# Patient Record
Sex: FEMALE | Race: WHITE | NOT HISPANIC OR LATINO | Employment: OTHER | URBAN - METROPOLITAN AREA
[De-identification: names, ages, dates, MRNs, and addresses within clinical notes are randomized per-mention and may not be internally consistent; named-entity substitution may affect disease eponyms.]

---

## 2019-09-01 ENCOUNTER — APPOINTMENT (EMERGENCY)
Dept: RADIOLOGY | Facility: HOSPITAL | Age: 84
End: 2019-09-01
Payer: COMMERCIAL

## 2019-09-01 ENCOUNTER — HOSPITAL ENCOUNTER (EMERGENCY)
Facility: HOSPITAL | Age: 84
Discharge: HOME | End: 2019-09-01
Attending: EMERGENCY MEDICINE | Admitting: EMERGENCY MEDICINE
Payer: COMMERCIAL

## 2019-09-01 VITALS
RESPIRATION RATE: 16 BRPM | DIASTOLIC BLOOD PRESSURE: 78 MMHG | TEMPERATURE: 98 F | HEART RATE: 89 BPM | SYSTOLIC BLOOD PRESSURE: 135 MMHG
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
## 2019-09-01 DIAGNOSIS — S01.81XA FOREHEAD LACERATION, INITIAL ENCOUNTER: ICD-10-CM

## 2019-09-01 PROCEDURE — 99284 EMERGENCY DEPT VISIT MOD MDM: CPT | Performed by: EMERGENCY MEDICINE

## 2019-09-01 PROCEDURE — 90471 IMMUNIZATION ADMIN: CPT

## 2019-09-01 PROCEDURE — 99284 EMERGENCY DEPT VISIT MOD MDM: CPT

## 2019-09-01 PROCEDURE — 71045 X-RAY EXAM CHEST 1 VIEW: CPT

## 2019-09-01 PROCEDURE — 72125 CT NECK SPINE W/O DYE: CPT

## 2019-09-01 PROCEDURE — 73560 X-RAY EXAM OF KNEE 1 OR 2: CPT

## 2019-09-01 PROCEDURE — 70450 CT HEAD/BRAIN W/O DYE: CPT

## 2019-09-01 PROCEDURE — 74176 CT ABD & PELVIS W/O CONTRAST: CPT

## 2019-09-01 PROCEDURE — 90715 TDAP VACCINE 7 YRS/> IM: CPT | Performed by: EMERGENCY MEDICINE

## 2019-09-01 PROCEDURE — 12011 RPR F/E/E/N/L/M 2.5 CM/<: CPT | Performed by: EMERGENCY MEDICINE

## 2019-09-01 RX ORDER — ACETAMINOPHEN 500 MG
500 TABLET ORAL
COMMUNITY
Start: 2019-07-08

## 2019-09-01 RX ORDER — DONEPEZIL HYDROCHLORIDE 10 MG/1
TABLET, FILM COATED ORAL
COMMUNITY
Start: 2019-06-08

## 2019-09-01 RX ORDER — MIRTAZAPINE 7.5 MG/1
TABLET, FILM COATED ORAL
COMMUNITY
Start: 2019-04-11

## 2019-09-01 RX ORDER — FUROSEMIDE 20 MG/1
TABLET ORAL
COMMUNITY
Start: 2019-05-14

## 2019-09-01 RX ORDER — ACETAMINOPHEN 500 MG
500 TABLET ORAL EVERY 6 HOURS PRN
Qty: 30 TABLET | Refills: 0 | Status: SHIPPED | OUTPATIENT
Start: 2019-09-01

## 2019-09-01 RX ORDER — LIDOCAINE HYDROCHLORIDE AND EPINEPHRINE 10; 10 MG/ML; UG/ML
1 INJECTION, SOLUTION INFILTRATION; PERINEURAL ONCE
Status: COMPLETED | OUTPATIENT
Start: 2019-09-01 | End: 2019-09-01

## 2019-09-01 RX ORDER — LOPERAMIDE HYDROCHLORIDE 2 MG/1
CAPSULE ORAL
COMMUNITY
Start: 2019-03-07

## 2019-09-01 RX ADMIN — LIDOCAINE HYDROCHLORIDE,EPINEPHRINE BITARTRATE 1 ML: 10; .01 INJECTION, SOLUTION INFILTRATION; PERINEURAL at 22:17

## 2019-09-01 RX ADMIN — TETANUS TOXOID, REDUCED DIPHTHERIA TOXOID AND ACELLULAR PERTUSSIS VACCINE, ADSORBED 0.5 ML: 5; 2.5; 8; 8; 2.5 SUSPENSION INTRAMUSCULAR at 21:17

## 2019-09-02 NOTE — ED ATTENDING ATTESTATION
Violetta Raygoza MD, saw and evaluated the patient  I have discussed the patient with the resident/non-physician practitioner and agree with the resident's/non-physician practitioner's findings, Plan of Care, and MDM as documented in the resident's/non-physician practitioner's note, except where noted  All available labs and Radiology studies were reviewed  I was present for key portions of any procedure(s) performed by the resident/non-physician practitioner and I was immediately available to provide assistance  At this point I agree with the current assessment done in the Emergency Department    I have conducted an independent evaluation of this patient a history and physical is as follows:   Pt missed curb fell and hit head no loc co lac to forehead no neck pain Pt co pain in R sacral area and some r sided chest wall tenderness PE: alert lac to forehead neck and spine nontender mild is tenderness neuro nonfocal r chest wall mild tenderness to palpation MDM: will do ct head and neck xray pelvis cxr    Critical Care Time  Procedures

## 2019-09-02 NOTE — ED PROVIDER NOTES
History  Chief Complaint   Patient presents with   Alejandrovito Brennero Fall     pt was getting into the car when she tripped and hit her head and left knee  pt has c/o of a little pain  -LOC -Thinners      81 y/o F presents for a head laceration after a fall  Pt states that she missed the curb and fell, striking her head  No LOC, No AC/AP, no neck pain    Complains of lower back pain, Lt knee pain  Both are 4/10 and localized    Consult Evaluation - Trauma   Bonita Roberts 80 y o  female MRN: [id number]  Unit/Bed#: QCB Encounter: [id number]    Trauma Assessment and Plan:  CT head, C spine, abd plvs without contrast, CXR, Lt Knee xray  Lac repair    Objective  Vitals:   First set: Temperature: 98 °F (36 7 °C) (19)  Pulse: 89 (19)  Respirations: 16 (19)  Blood Pressure: 135/78 (19)    Primary Survey:   (A) Airway: Intact  (B) Breathing: CTA  (C) Circulation: Pulses:   normal  (D) Disabliity:  GCS Total:  15  (E) Expose:  Completed     Td updated                      Prior to Admission Medications   Prescriptions Last Dose Informant Patient Reported? Taking?   acetaminophen (MAPAP EXTRA STRENGTH) 500 mg tablet 2019 at Unknown time  Yes Yes   Sig: Take 500 mg by mouth   donepezil (ARICEPT) 10 mg tablet 2019 at Unknown time  Yes Yes   Sig: TAKE ONE TABLET BY MOUTH NIGHTLY (DEMENTIA)      furosemide (LASIX) 20 mg tablet 2019 at Unknown time  Yes Yes   Sig: TAKE ONE TABLET BY MOUTH EVERY DAY (EDEMA)   guaiFENesin (ROBITUSSIN) 100 MG/5ML oral liquid 2019 at Unknown time  Yes Yes   Sig: Take 200 mg by mouth Three times daily as needed   loperamide (IMODIUM) 2 mg capsule 2019 at Unknown time  Yes Yes   Si cap every day as needed   mirtazapine (REMERON) 7 5 MG tablet 2019 at Unknown time  Yes Yes   Sig: TAKE ONE TABLET BY MOUTH NIGHTLY (INSOMNIA)   sertraline (ZOLOFT) 50 mg tablet 2019 at Unknown time  Yes Yes   Sig: TAKE ONE TABLET BY MOUTH EVERY DAY (DEPRESSION)      Facility-Administered Medications: None       History reviewed  No pertinent past medical history  No past surgical history on file  History reviewed  No pertinent family history  I have reviewed and agree with the history as documented  Social History     Tobacco Use    Smoking status: Not on file   Substance Use Topics    Alcohol use: Not on file    Drug use: Not on file        Review of Systems   Constitutional: Negative for chills, diaphoresis, fatigue and fever  HENT: Negative for congestion, ear discharge, facial swelling, hearing loss, rhinorrhea, sinus pressure, sinus pain, sneezing, sore throat, tinnitus and trouble swallowing  Eyes: Negative for pain, discharge and redness  Respiratory: Negative for cough, choking, chest tightness, shortness of breath, wheezing and stridor  Cardiovascular: Negative for chest pain, palpitations and leg swelling  Gastrointestinal: Negative for abdominal distention, abdominal pain, blood in stool, constipation, diarrhea, nausea and vomiting  Endocrine: Negative for cold intolerance, polydipsia and polyuria  Genitourinary: Negative for difficulty urinating, dysuria, enuresis, flank pain, frequency and hematuria  Musculoskeletal: Negative for arthralgias, back pain, gait problem and neck stiffness  Skin: Positive for wound  Negative for rash  Neurological: Negative for dizziness, seizures, syncope, weakness, numbness and headaches  Hematological: Negative for adenopathy  Psychiatric/Behavioral: Negative for agitation, confusion, hallucinations, sleep disturbance and suicidal ideas  All other systems reviewed and are negative        Physical Exam  ED Triage Vitals   Temperature Pulse Respirations Blood Pressure SpO2   09/01/19 2035 09/01/19 2035 09/01/19 2035 09/01/19 2046 --   98 °F (36 7 °C) 89 16 135/78       Temp Source Heart Rate Source Patient Position - Orthostatic VS BP Location FiO2 (%)   09/01/19 2035 -- -- -- --   Oral          Pain Score       --                    Orthostatic Vital Signs  Vitals:    09/01/19 2035 09/01/19 2046   BP:  135/78   Pulse: 89        Physical Exam   Constitutional: She is oriented to person, place, and time  She appears well-developed and well-nourished  No distress  HENT:   Head: Normocephalic and atraumatic  Right Ear: External ear normal    Left Ear: External ear normal    Nose: No sinus tenderness  No epistaxis  Mouth/Throat: No oropharyngeal exudate  Eyes: Pupils are equal, round, and reactive to light  Conjunctivae and EOM are normal  Right eye exhibits no discharge  Left eye exhibits no discharge  Neck: No JVD present  Cardiovascular: Normal rate, regular rhythm, normal heart sounds and intact distal pulses  Exam reveals no gallop and no friction rub  No murmur heard  Pulmonary/Chest: Effort normal and breath sounds normal  No stridor  No respiratory distress  She has no wheezes  She has no rales  Abdominal: Soft  Bowel sounds are normal  She exhibits no distension and no mass  There is no tenderness  There is no rebound and no guarding  Musculoskeletal: Normal range of motion  She exhibits no edema, tenderness or deformity  Back:         Arms:  Lymphadenopathy:     She has no cervical adenopathy  Neurological: She is alert and oriented to person, place, and time  She has normal strength  No cranial nerve deficit or sensory deficit  GCS eye subscore is 4  GCS verbal subscore is 5  GCS motor subscore is 6  Reflex Scores:       Patellar reflexes are 2+ on the right side and 2+ on the left side  UE and LE 5/5 strength, No focal neuro deficits  Skin: Skin is warm, dry and intact  Capillary refill takes less than 2 seconds  She is not diaphoretic  Psychiatric: She has a normal mood and affect  Her speech is normal and behavior is normal  Judgment and thought content normal    Nursing note and vitals reviewed        ED Medications  Medications tetanus-diphtheria-acellular pertussis (BOOSTRIX) IM injection 0 5 mL (0 5 mL Intramuscular Given 9/1/19 2117)   lidocaine-epinephrine (XYLOCAINE/EPINEPHRINE) 1 %-1:100,000 injection 1 mL (1 mL Infiltration Given 9/1/19 2217)       Diagnostic Studies  Results Reviewed     None                 CT spine cervical without contrast   ED Interpretation by Dino Madera DO (09/01 2215)      No cervical spine fracture or traumatic malalignment  Workstation performed: [id number]         Final Result by Javon Scales MD (09/01 2213)      No cervical spine fracture or traumatic malalignment  Workstation performed: [id number]         CT head without contrast   ED Interpretation by Dino Madera DO (09/01 2214)      No acute intracranial abnormality  Workstation performed: [id number]         Final Result by Javon Scales MD (09/01 2212)      No acute intracranial abnormality  Workstation performed: [id number]         CT abdomen pelvis wo contrast   Final Result by Javon Scales MD (09/01 2217)      No visceral injury in the abdomen or pelvis  No hemoperitoneum  No acute fracture  Hiatal hernia  Colonic diverticulosis  Atherosclerotic vascular calcifications  Workstation performed: [id number]         XR knee 1 or 2 views left    (Results Pending)   XR chest 1 view portable    (Results Pending)         Procedures  Laceration repair  Date/Time: 9/1/2019 10:18 PM  Performed by: Dino Madera DO  Authorized by: Dino Madera DO   Consent: Verbal consent obtained    Risks and benefits: risks, benefits and alternatives were discussed  Consent given by: patient  Patient understanding: patient states understanding of the procedure being performed  Patient identity confirmed: verbally with patient  Body area: head/neck  Location details: forehead  Laceration length: 2 cm  Foreign bodies: no foreign bodies  Tendon involvement: none  Nerve involvement: none  Anesthesia: local infiltration    Anesthesia:  Local Anesthetic: lidocaine 1% with epinephrine  Anesthetic total: 3 mL    Sedation:  Patient sedated: no      Wound Dehiscence:  Superficial Wound Dehiscence: simple closure      Procedure Details:  Preparation: Patient was prepped and draped in the usual sterile fashion  Irrigation solution: saline  Amount of cleaning: extensive  Debridement: none  Degree of undermining: none  Skin closure: 5-0 nylon  Number of sutures: 6  Technique: simple  Approximation: close  Approximation difficulty: simple  Patient tolerance: Patient tolerated the procedure well with no immediate complications              ED Course                               MDM  Number of Diagnoses or Management Options  Fall, initial encounter: new and requires workup  Forehead laceration, initial encounter: new and requires workup  Diagnosis management comments: Work up as above    1  Head contusion  -tylenol 500mg Q4-6hrs     2  Forehead laceration  -Repaired see above  -f/u withint 5-7 days for eval of sutures    3  Gluteal contusion  -PCP f/u  -Tylenol    4  Chest wall pain  -as above    5  CT findings as below discussed with family and patient  Cortical cyst noted in the interpolar left kidney  Hiatal hernia  Colonic diverticulosis  Atherosclerotic vascular calcifications  Disposition  Final diagnoses:   Fall, initial encounter   Forehead laceration, initial encounter     Time reflects when diagnosis was documented in both MDM as applicable and the Disposition within this note     Time User Action Codes Description Comment    9/1/2019 10:19 PM Dana Saha Add [W19  SEQS] Fall, initial encounter     9/1/2019 10:19 PM Dana Saha Add [S01 81XA] Forehead laceration, initial encounter       ED Disposition     ED Disposition Condition Date/Time Comment    Discharge Stable Sun Sep 1, 2019 10:19 PM Adriane Libman discharge to home/self care             Follow-up Information     Follow up With Specialties Details Why Contact Info Additional Information    [employer]  Call in 1 day  [phone]       Call you primary care physicain in 2 days         [address] [facility] Emergency Department Emergency Medicine Go to  If symptoms worsen, As needed [address] [facility] ED, [address] [facility] [address], [place], [address] [place] [address], [phone]          Patient's Medications   Discharge Prescriptions    ACETAMINOPHEN (TYLENOL) 500 MG TABLET    Take 1 tablet (500 mg total) by mouth every 6 (six) hours as needed for moderate pain       Start Date: 9/1/2019  End Date: --       Order Dose: 500 mg       Quantity: 30 tablet    Refills: 0     No discharge procedures on file  ED Provider  Attending physically available and evaluated Fidelia Jernigan I managed the patient along with the ED Attending      Electronically Signed by         Hasmukh Rasmussen DO  09/01/19 1600 Viridiana Martínez DO  09/01/19 8234

## 2019-09-02 NOTE — DISCHARGE INSTRUCTIONS
Your sutures will need to be evaluated and removed in 5-7 days  Please schedule an appointment with your primary physician or go to an emergency department or urgent care  CT findings showed incidental findings as stated below  Please follow up with your primary care physician regarding these  Cortical cyst noted in the interpolar left kidney  Hiatal hernia  Colonic diverticulosis  Atherosclerotic vascular calcifications

## 2024-01-01 ENCOUNTER — HOME CARE VISIT (OUTPATIENT)
Dept: HOME HOSPICE | Facility: HOSPICE | Age: 89
End: 2024-01-01
Payer: MEDICARE

## 2024-01-01 ENCOUNTER — HOME CARE VISIT (OUTPATIENT)
Dept: HOME HEALTH SERVICES | Facility: HOME HEALTHCARE | Age: 89
End: 2024-01-01
Payer: MEDICARE

## 2024-01-01 ENCOUNTER — HOSPICE ADMISSION (OUTPATIENT)
Dept: HOME HOSPICE | Facility: HOSPICE | Age: 89
End: 2024-01-01
Payer: MEDICARE

## 2024-01-01 PROCEDURE — G0299 HHS/HOSPICE OF RN EA 15 MIN: HCPCS

## 2024-01-01 PROCEDURE — 10330087 HSPC SERVICE INTENSITY ADD-ON

## 2024-01-01 PROCEDURE — G0156 HHCP-SVS OF AIDE,EA 15 MIN: HCPCS

## 2024-08-11 ENCOUNTER — APPOINTMENT (EMERGENCY)
Dept: RADIOLOGY | Facility: HOSPITAL | Age: 89
DRG: 552 | End: 2024-08-11
Payer: COMMERCIAL

## 2024-08-11 ENCOUNTER — HOSPITAL ENCOUNTER (INPATIENT)
Facility: HOSPITAL | Age: 89
LOS: 4 days | Discharge: HOME HEALTH | DRG: 552 | End: 2024-08-15
Attending: EMERGENCY MEDICINE | Admitting: SURGERY
Payer: COMMERCIAL

## 2024-08-11 ENCOUNTER — APPOINTMENT (INPATIENT)
Dept: RADIOLOGY | Facility: HOSPITAL | Age: 89
DRG: 552 | End: 2024-08-11
Payer: COMMERCIAL

## 2024-08-11 ENCOUNTER — APPOINTMENT (EMERGENCY)
Dept: CT IMAGING | Facility: HOSPITAL | Age: 89
DRG: 552 | End: 2024-08-11
Payer: COMMERCIAL

## 2024-08-11 DIAGNOSIS — R26.2 AMBULATORY DYSFUNCTION: ICD-10-CM

## 2024-08-11 DIAGNOSIS — R65.10 SIRS (SYSTEMIC INFLAMMATORY RESPONSE SYNDROME) (HCC): ICD-10-CM

## 2024-08-11 DIAGNOSIS — S22.000A THORACIC COMPRESSION FRACTURE (HCC): Primary | ICD-10-CM

## 2024-08-11 DIAGNOSIS — K86.9 PANCREATIC LESION: ICD-10-CM

## 2024-08-11 DIAGNOSIS — N39.0 ACUTE UTI (URINARY TRACT INFECTION): ICD-10-CM

## 2024-08-11 DIAGNOSIS — I48.4 ATYPICAL ATRIAL FLUTTER (HCC): ICD-10-CM

## 2024-08-11 DIAGNOSIS — I48.92 ATRIAL FLUTTER, UNSPECIFIED TYPE (HCC): ICD-10-CM

## 2024-08-11 DIAGNOSIS — R06.00 DYSPNEA: ICD-10-CM

## 2024-08-11 DIAGNOSIS — K44.9 HIATAL HERNIA: ICD-10-CM

## 2024-08-11 DIAGNOSIS — R03.0 ELEVATED BLOOD PRESSURE READING: ICD-10-CM

## 2024-08-11 DIAGNOSIS — R07.9 CHEST PAIN: ICD-10-CM
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
## 2024-08-11 PROBLEM — S22.079A CLOSED FRACTURE OF NINTH THORACIC VERTEBRA (HCC): Status: ACTIVE | Noted: 2024-08-11

## 2024-08-11 LAB
2HR DELTA HS TROPONIN: -3 NG/L
4HR DELTA HS TROPONIN: -1 NG/L
ABO GROUP BLD: NORMAL
ABO GROUP BLD: NORMAL
ALBUMIN SERPL BCG-MCNC: 4.2 G/DL (ref 3.5–5)
ALP SERPL-CCNC: 62 U/L (ref 34–104)
ALT SERPL W P-5'-P-CCNC: 19 U/L (ref 7–52)
ANION GAP SERPL CALCULATED.3IONS-SCNC: 12 MMOL/L (ref 4–13)
APTT PPP: 27 SECONDS (ref 23–34)
AST SERPL W P-5'-P-CCNC: 19 U/L (ref 13–39)
ATRIAL RATE: 98 BPM
BACTERIA UR QL AUTO: ABNORMAL /HPF
BASOPHILS # BLD AUTO: 0.02 THOUSANDS/ÂΜL (ref 0–0.1)
BASOPHILS NFR BLD AUTO: 0 % (ref 0–1)
BILIRUB SERPL-MCNC: 0.87 MG/DL (ref 0.2–1)
BILIRUB UR QL STRIP: NEGATIVE
BLD GP AB SCN SERPL QL: NEGATIVE
BUN SERPL-MCNC: 29 MG/DL (ref 5–25)
CALCIUM SERPL-MCNC: 9.4 MG/DL (ref 8.4–10.2)
CARDIAC TROPONIN I PNL SERPL HS: 30 NG/L
CARDIAC TROPONIN I PNL SERPL HS: 32 NG/L
CARDIAC TROPONIN I PNL SERPL HS: 33 NG/L
CHLORIDE SERPL-SCNC: 105 MMOL/L (ref 96–108)
CK SERPL-CCNC: 50 U/L (ref 26–192)
CLARITY UR: CLEAR
CO2 SERPL-SCNC: 23 MMOL/L (ref 21–32)
COLOR UR: ABNORMAL
CREAT SERPL-MCNC: 1.15 MG/DL (ref 0.6–1.3)
EOSINOPHIL # BLD AUTO: 0 THOUSAND/ÂΜL (ref 0–0.61)
EOSINOPHIL NFR BLD AUTO: 0 % (ref 0–6)
ERYTHROCYTE [DISTWIDTH] IN BLOOD BY AUTOMATED COUNT: 13.7 % (ref 11.6–15.1)
GFR SERPL CREATININE-BSD FRML MDRD: 39 ML/MIN/1.73SQ M
GLUCOSE SERPL-MCNC: 152 MG/DL (ref 65–140)
GLUCOSE UR STRIP-MCNC: NEGATIVE MG/DL
HCT VFR BLD AUTO: 42.5 % (ref 34.8–46.1)
HGB BLD-MCNC: 13.6 G/DL (ref 11.5–15.4)
HGB UR QL STRIP.AUTO: ABNORMAL
IMM GRANULOCYTES # BLD AUTO: 0.09 THOUSAND/UL (ref 0–0.2)
IMM GRANULOCYTES NFR BLD AUTO: 1 % (ref 0–2)
INR PPP: 0.99 (ref 0.85–1.19)
KETONES UR STRIP-MCNC: NEGATIVE MG/DL
LACTATE SERPL-SCNC: 1 MMOL/L (ref 0.5–2)
LACTATE SERPL-SCNC: 3.3 MMOL/L (ref 0.5–2)
LEUKOCYTE ESTERASE UR QL STRIP: ABNORMAL
LIPASE SERPL-CCNC: 10 U/L (ref 11–82)
LYMPHOCYTES # BLD AUTO: 1.03 THOUSANDS/ÂΜL (ref 0.6–4.47)
LYMPHOCYTES NFR BLD AUTO: 7 % (ref 14–44)
MCH RBC QN AUTO: 31.9 PG (ref 26.8–34.3)
MCHC RBC AUTO-ENTMCNC: 32 G/DL (ref 31.4–37.4)
MCV RBC AUTO: 100 FL (ref 82–98)
MONOCYTES # BLD AUTO: 0.62 THOUSAND/ÂΜL (ref 0.17–1.22)
MONOCYTES NFR BLD AUTO: 4 % (ref 4–12)
NEUTROPHILS # BLD AUTO: 12.84 THOUSANDS/ÂΜL (ref 1.85–7.62)
NEUTS SEG NFR BLD AUTO: 88 % (ref 43–75)
NITRITE UR QL STRIP: NEGATIVE
NON-SQ EPI CELLS URNS QL MICRO: ABNORMAL /HPF
NRBC BLD AUTO-RTO: 0 /100 WBCS
P AXIS: 88 DEGREES
PH UR STRIP.AUTO: 5.5 [PH]
PLATELET # BLD AUTO: 299 THOUSANDS/UL (ref 149–390)
PMV BLD AUTO: 10.3 FL (ref 8.9–12.7)
POTASSIUM SERPL-SCNC: 3.7 MMOL/L (ref 3.5–5.3)
PR INTERVAL: 168 MS
PROCALCITONIN SERPL-MCNC: 0.14 NG/ML
PROT SERPL-MCNC: 7.8 G/DL (ref 6.4–8.4)
PROT UR STRIP-MCNC: ABNORMAL MG/DL
PROTHROMBIN TIME: 13.8 SECONDS (ref 12.3–15)
QRS AXIS: 55 DEGREES
QRSD INTERVAL: 72 MS
QT INTERVAL: 360 MS
QTC INTERVAL: 454 MS
RBC # BLD AUTO: 4.27 MILLION/UL (ref 3.81–5.12)
RBC #/AREA URNS AUTO: ABNORMAL /HPF
RH BLD: POSITIVE
RH BLD: POSITIVE
SODIUM SERPL-SCNC: 140 MMOL/L (ref 135–147)
SP GR UR STRIP.AUTO: 1.02 (ref 1–1.03)
SPECIMEN EXPIRATION DATE: NORMAL
T WAVE AXIS: 20 DEGREES
UROBILINOGEN UR STRIP-ACNC: <2 MG/DL
VENTRICULAR RATE: 96 BPM
WBC # BLD AUTO: 14.6 THOUSAND/UL (ref 4.31–10.16)
WBC #/AREA URNS AUTO: ABNORMAL /HPF

## 2024-08-11 PROCEDURE — 86901 BLOOD TYPING SEROLOGIC RH(D): CPT | Performed by: PHYSICIAN ASSISTANT

## 2024-08-11 PROCEDURE — 85610 PROTHROMBIN TIME: CPT | Performed by: EMERGENCY MEDICINE

## 2024-08-11 PROCEDURE — 83605 ASSAY OF LACTIC ACID: CPT | Performed by: EMERGENCY MEDICINE

## 2024-08-11 PROCEDURE — 93010 ELECTROCARDIOGRAM REPORT: CPT | Performed by: INTERNAL MEDICINE

## 2024-08-11 PROCEDURE — 84145 PROCALCITONIN (PCT): CPT | Performed by: EMERGENCY MEDICINE

## 2024-08-11 PROCEDURE — 72125 CT NECK SPINE W/O DYE: CPT

## 2024-08-11 PROCEDURE — 85730 THROMBOPLASTIN TIME PARTIAL: CPT | Performed by: EMERGENCY MEDICINE

## 2024-08-11 PROCEDURE — 83690 ASSAY OF LIPASE: CPT | Performed by: EMERGENCY MEDICINE

## 2024-08-11 PROCEDURE — 97163 PT EVAL HIGH COMPLEX 45 MIN: CPT

## 2024-08-11 PROCEDURE — 73590 X-RAY EXAM OF LOWER LEG: CPT

## 2024-08-11 PROCEDURE — 99223 1ST HOSP IP/OBS HIGH 75: CPT | Performed by: SURGERY

## 2024-08-11 PROCEDURE — 84484 ASSAY OF TROPONIN QUANT: CPT | Performed by: EMERGENCY MEDICINE

## 2024-08-11 PROCEDURE — 87040 BLOOD CULTURE FOR BACTERIA: CPT | Performed by: EMERGENCY MEDICINE

## 2024-08-11 PROCEDURE — 87086 URINE CULTURE/COLONY COUNT: CPT | Performed by: EMERGENCY MEDICINE

## 2024-08-11 PROCEDURE — 85025 COMPLETE CBC W/AUTO DIFF WBC: CPT | Performed by: EMERGENCY MEDICINE

## 2024-08-11 PROCEDURE — 36415 COLL VENOUS BLD VENIPUNCTURE: CPT | Performed by: EMERGENCY MEDICINE

## 2024-08-11 PROCEDURE — 93005 ELECTROCARDIOGRAM TRACING: CPT

## 2024-08-11 PROCEDURE — 74176 CT ABD & PELVIS W/O CONTRAST: CPT

## 2024-08-11 PROCEDURE — 96365 THER/PROPH/DIAG IV INF INIT: CPT

## 2024-08-11 PROCEDURE — 86900 BLOOD TYPING SEROLOGIC ABO: CPT | Performed by: PHYSICIAN ASSISTANT

## 2024-08-11 PROCEDURE — 80053 COMPREHEN METABOLIC PANEL: CPT | Performed by: EMERGENCY MEDICINE

## 2024-08-11 PROCEDURE — 99291 CRITICAL CARE FIRST HOUR: CPT | Performed by: EMERGENCY MEDICINE

## 2024-08-11 PROCEDURE — 71250 CT THORAX DX C-: CPT

## 2024-08-11 PROCEDURE — 96375 TX/PRO/DX INJ NEW DRUG ADDON: CPT

## 2024-08-11 PROCEDURE — 97760 ORTHOTIC MGMT&TRAING 1ST ENC: CPT

## 2024-08-11 PROCEDURE — 70450 CT HEAD/BRAIN W/O DYE: CPT

## 2024-08-11 PROCEDURE — 82550 ASSAY OF CK (CPK): CPT | Performed by: EMERGENCY MEDICINE

## 2024-08-11 PROCEDURE — 81001 URINALYSIS AUTO W/SCOPE: CPT | Performed by: EMERGENCY MEDICINE

## 2024-08-11 PROCEDURE — 72072 X-RAY EXAM THORAC SPINE 3VWS: CPT

## 2024-08-11 PROCEDURE — 86850 RBC ANTIBODY SCREEN: CPT | Performed by: PHYSICIAN ASSISTANT

## 2024-08-11 PROCEDURE — 99285 EMERGENCY DEPT VISIT HI MDM: CPT

## 2024-08-11 RX ORDER — SODIUM CHLORIDE, SODIUM GLUCONATE, SODIUM ACETATE, POTASSIUM CHLORIDE, MAGNESIUM CHLORIDE, SODIUM PHOSPHATE, DIBASIC, AND POTASSIUM PHOSPHATE .53; .5; .37; .037; .03; .012; .00082 G/100ML; G/100ML; G/100ML; G/100ML; G/100ML; G/100ML; G/100ML
50 INJECTION, SOLUTION INTRAVENOUS CONTINUOUS
Status: DISCONTINUED | OUTPATIENT
Start: 2024-08-11 | End: 2024-08-12

## 2024-08-11 RX ORDER — AMLODIPINE BESYLATE 2.5 MG/1
2.5 TABLET ORAL DAILY
COMMUNITY

## 2024-08-11 RX ORDER — HYDROMORPHONE HCL IN WATER/PF 6 MG/30 ML
0.2 PATIENT CONTROLLED ANALGESIA SYRINGE INTRAVENOUS EVERY 2 HOUR PRN
Status: DISCONTINUED | OUTPATIENT
Start: 2024-08-11 | End: 2024-08-12

## 2024-08-11 RX ORDER — AMOXICILLIN 250 MG
1 CAPSULE ORAL
Status: DISCONTINUED | OUTPATIENT
Start: 2024-08-11 | End: 2024-08-15 | Stop reason: HOSPADM

## 2024-08-11 RX ORDER — ONDANSETRON 2 MG/ML
4 INJECTION INTRAMUSCULAR; INTRAVENOUS ONCE
Status: COMPLETED | OUTPATIENT
Start: 2024-08-11 | End: 2024-08-11

## 2024-08-11 RX ORDER — NYSTATIN 100000 [USP'U]/G
POWDER TOPICAL 2 TIMES DAILY
Status: DISCONTINUED | OUTPATIENT
Start: 2024-08-11 | End: 2024-08-15 | Stop reason: HOSPADM

## 2024-08-11 RX ORDER — GABAPENTIN 100 MG/1
100 CAPSULE ORAL
Status: DISCONTINUED | OUTPATIENT
Start: 2024-08-11 | End: 2024-08-15 | Stop reason: HOSPADM

## 2024-08-11 RX ORDER — OXYCODONE HYDROCHLORIDE 5 MG/1
5 TABLET ORAL EVERY 4 HOURS PRN
Status: DISCONTINUED | OUTPATIENT
Start: 2024-08-11 | End: 2024-08-15 | Stop reason: HOSPADM

## 2024-08-11 RX ORDER — LIDOCAINE 50 MG/G
1 PATCH TOPICAL DAILY
Status: DISCONTINUED | OUTPATIENT
Start: 2024-08-11 | End: 2024-08-15 | Stop reason: HOSPADM

## 2024-08-11 RX ORDER — HEPARIN SODIUM 5000 [USP'U]/ML
5000 INJECTION, SOLUTION INTRAVENOUS; SUBCUTANEOUS EVERY 8 HOURS SCHEDULED
Status: DISCONTINUED | OUTPATIENT
Start: 2024-08-11 | End: 2024-08-15 | Stop reason: HOSPADM

## 2024-08-11 RX ORDER — POLYETHYLENE GLYCOL 3350 17 G/17G
17 POWDER, FOR SOLUTION ORAL DAILY
Status: DISCONTINUED | OUTPATIENT
Start: 2024-08-11 | End: 2024-08-15 | Stop reason: HOSPADM

## 2024-08-11 RX ORDER — ONDANSETRON 2 MG/ML
4 INJECTION INTRAMUSCULAR; INTRAVENOUS EVERY 6 HOURS PRN
Status: DISCONTINUED | OUTPATIENT
Start: 2024-08-11 | End: 2024-08-15 | Stop reason: HOSPADM

## 2024-08-11 RX ORDER — ACETAMINOPHEN 325 MG/1
975 TABLET ORAL EVERY 8 HOURS SCHEDULED
Status: DISCONTINUED | OUTPATIENT
Start: 2024-08-11 | End: 2024-08-15 | Stop reason: HOSPADM

## 2024-08-11 RX ADMIN — SODIUM CHLORIDE, SODIUM GLUCONATE, SODIUM ACETATE, POTASSIUM CHLORIDE, MAGNESIUM CHLORIDE, SODIUM PHOSPHATE, DIBASIC, AND POTASSIUM PHOSPHATE 50 ML/HR: .53; .5; .37; .037; .03; .012; .00082 INJECTION, SOLUTION INTRAVENOUS at 12:27

## 2024-08-11 RX ADMIN — LIDOCAINE 1 PATCH: 700 PATCH TOPICAL at 12:27

## 2024-08-11 RX ADMIN — NYSTATIN: 100000 POWDER TOPICAL at 18:56

## 2024-08-11 RX ADMIN — POLYETHYLENE GLYCOL 3350 17 G: 17 POWDER, FOR SOLUTION ORAL at 17:50

## 2024-08-11 RX ADMIN — CEFTRIAXONE SODIUM 1000 MG: 10 INJECTION, POWDER, FOR SOLUTION INTRAVENOUS at 10:06

## 2024-08-11 RX ADMIN — ONDANSETRON 4 MG: 2 INJECTION INTRAMUSCULAR; INTRAVENOUS at 09:01

## 2024-08-11 RX ADMIN — SODIUM CHLORIDE 500 ML: 0.9 INJECTION, SOLUTION INTRAVENOUS at 09:46

## 2024-08-11 RX ADMIN — HEPARIN SODIUM 5000 UNITS: 5000 INJECTION INTRAVENOUS; SUBCUTANEOUS at 21:30

## 2024-08-11 RX ADMIN — HEPARIN SODIUM 5000 UNITS: 5000 INJECTION INTRAVENOUS; SUBCUTANEOUS at 15:02

## 2024-08-11 RX ADMIN — ACETAMINOPHEN 975 MG: 325 TABLET, FILM COATED ORAL at 21:30

## 2024-08-11 RX ADMIN — SENNOSIDES AND DOCUSATE SODIUM 1 TABLET: 50; 8.6 TABLET ORAL at 21:30

## 2024-08-11 RX ADMIN — ONDANSETRON 4 MG: 2 INJECTION INTRAMUSCULAR; INTRAVENOUS at 15:02

## 2024-08-11 RX ADMIN — GABAPENTIN 100 MG: 100 CAPSULE ORAL at 21:30

## 2024-08-11 RX ADMIN — ACETAMINOPHEN 975 MG: 325 TABLET, FILM COATED ORAL at 15:02

## 2024-08-11 NOTE — SEPSIS NOTE
"  Sepsis Note   lOiva Dc 89 y.o. female MRN: [id number]  Unit/Bed#: W -01 Encounter: [id number]       Initial Sepsis Screening       Row Name 08/11/24 1818 08/11/24 1005 08/11/24 0907          Is the patient's history suggestive of a new or worsening infection? Yes (Proceed)  -CL Yes (Proceed)  -CL Yes (Proceed)  -CL      Suspected source of infection suspect infection, source unknown  -CL suspect infection, source unknown  -CL suspect infection, source unknown  -CL      Indicate SIRS criteria Tachycardia > 90 bpm;Tachypnea > 20 resp per min  -CL Tachycardia > 90 bpm;Leukocytosis (WBC > 87614 IJL) OR Leukopenia (WBC <4000 IJL) OR Bandemia (WBC >10% bands)  -CL Tachycardia > 90 bpm;Tachypnea > 20 resp per min  -CL      Are two or more of the above signs & symptoms of infection both present and new to the patient? Yes (Proceed)  -CL Yes (Proceed)  -CL Yes (Proceed)  -CL      Assess for evidence of organ dysfunction: Are any of the below criteria present within 6 hours of suspected infection and SIRS criteria that are NOT considered to be chronic conditions? Lactate > 2.0  -CL Lactate > 2.0  -CL Lactate > 2.0  -CL      Date of presentation of severe sepsis 08/11/24  -CL 08/11/24  -CL 08/11/24  -CL      Time of presentation of severe sepsis 0907  -CL 1005  -CL 0907  -CL      Sepsis Note: Click \"NEXT\" below (NOT \"close\") to generate sepsis note based on above information. YES (proceed by clicking \"NEXT\")  -CL YES (proceed by clicking \"NEXT\")  -CL YES (proceed by clicking \"NEXT\")  -CL                User Key  (r) = Recorded By, (t) = Taken By, (c) = Cosigned By      Initials Name Provider Type    CL Cortes Jiménez MD Physician                    Default Flowsheet Data (Last 720 Hours)       Sepsis Reassess       Row Name 08/11/24 1006                   Repeat Volume Status and Tissue Perfusion Assessment Performed    Date of Reassessment: 08/11/24  -CL        Time of Reassessment: 1006  -CL        " "Sepsis Reassessment Note: Click \"NEXT\" below (NOT \"close\") to generate sepsis reassessment note. YES (proceed by clicking \"NEXT\")  -CL        Repeat Volume Status and Tissue Perfusion Assessment Performed --                  User Key  (r) = Recorded By, (t) = Taken By, (c) = Cosigned By      Initials Name Provider Type    CL Cortes Jiménez MD Physician                    Body mass index is 20.19 kg/m².  Wt Readings from Last 1 Encounters:   08/11/24 46.9 kg (103 lb 6.3 oz)     IBW (Ideal Body Weight): 45.5 kg    Ideal body weight: 45.5 kg (100 lb 4.9 oz)  Adjusted ideal body weight: 46.1 kg (101 lb 8.7 oz)    "

## 2024-08-11 NOTE — ED PROVIDER NOTES
History  Chief Complaint   Patient presents with    Fall     Fall 5 days ago out of bed onto a fan onto left side w +HS, -LOC. Pt was up all night vomiting. Dark vomit found on pt's nightgown. Pt c/o L sided rib pain, chest pain, and back pain. Pt is not on blood thinners or asa     Patient is a 89-year-old female, with a history significant for dementia and osteoarthritis per my review the medical record, who presents to the ED today for evaluation after fall.  Patient does remember falling and denies any prodromal symptoms.  She states she lost her footing causing her to fall over and strike her head.  Patient takes no anticoagulant medication.  Since the fall, patient has had constant and progressively worsening pain on her right chest wall and abdomen.  She reports difficulty walking as well as back pain and frequent vomiting as well.  Patient has been taking Tylenol, 2 to 3 pills daily, to remit her symptoms.  Touch/movement exacerbates her symptoms.  Patient's granddaughter, present in room and find collateral history, states patient is not confused.  Patient is without other concerns at this time        Prior to Admission Medications   Prescriptions Last Dose Informant Patient Reported? Taking?   acetaminophen (MAPAP EXTRA STRENGTH) 500 mg tablet 8/11/2024  Yes Yes   Sig: Take 500 mg by mouth   acetaminophen (TYLENOL) 500 mg tablet   No No   Sig: Take 1 tablet (500 mg total) by mouth every 6 (six) hours as needed for moderate pain   amLODIPine (NORVASC) 2.5 mg tablet 8/10/2024 at 0800 Family Member Yes Yes   Sig: Take 2.5 mg by mouth daily   donepezil (ARICEPT) 10 mg tablet 8/10/2024  Yes Yes   Sig: TAKE ONE TABLET BY MOUTH NIGHTLY (DEMENTIA)..   furosemide (LASIX) 20 mg tablet 8/10/2024  Yes Yes   Sig: TAKE ONE TABLET BY MOUTH EVERY DAY (EDEMA)   guaiFENesin (ROBITUSSIN) 100 MG/5ML oral liquid Not Taking  Yes No   Sig: Take 200 mg by mouth Three times daily as needed   Patient not taking: Reported on  2024   loperamide (IMODIUM) 2 mg capsule Not Taking  Yes No   Si cap every day as needed   Patient not taking: Reported on 2024   mirtazapine (REMERON) 7.5 MG tablet 8/10/2024  Yes Yes   Sig: TAKE ONE TABLET BY MOUTH NIGHTLY (INSOMNIA)   sertraline (ZOLOFT) 50 mg tablet 8/10/2024  Yes Yes   Sig: TAKE ONE TABLET BY MOUTH EVERY DAY (DEPRESSION)      Facility-Administered Medications: None       History reviewed. No pertinent past medical history.    History reviewed. No pertinent surgical history.    History reviewed. No pertinent family history.  I have reviewed and agree with the history as documented.    E-Cigarette/Vaping     E-Cigarette/Vaping Substances          Review of Systems   Constitutional:  Negative for fever.   HENT:  Negative for trouble swallowing.    Eyes:  Negative for visual disturbance.   Respiratory:  Negative for shortness of breath.    Cardiovascular:  Negative for chest pain.   Gastrointestinal:  Negative for abdominal pain.   Endocrine: Negative for polyuria.   Genitourinary:  Negative for dysuria.   Musculoskeletal:  Positive for back pain and gait problem.   Skin:  Negative for rash.   Allergic/Immunologic: Positive for environmental allergies.   Neurological:  Negative for weakness and numbness.   Hematological:  Negative for adenopathy.   Psychiatric/Behavioral:  Negative for confusion.    All other systems reviewed and are negative.      Physical Exam  Physical Exam  Vitals and nursing note reviewed.   Constitutional:       General: She is not in acute distress.     Appearance: She is not ill-appearing, toxic-appearing or diaphoretic.   HENT:      Head: Normocephalic and atraumatic.      Right Ear: External ear normal.      Left Ear: External ear normal.      Nose: Nose normal. No rhinorrhea.      Mouth/Throat:      Mouth: Mucous membranes are moist.      Pharynx: Oropharynx is clear. No oropharyngeal exudate or posterior oropharyngeal erythema.      Comments: Uvula  midline. No oropharyngeal or submandibular mass/swelling  Eyes:      General: No scleral icterus.        Right eye: No discharge.         Left eye: No discharge.      Conjunctiva/sclera: Conjunctivae normal.      Pupils: Pupils are equal, round, and reactive to light.   Neck:      Comments: Patient is spontaneously rotating their neck to the left and right during the history and physical exam interaction without difficulty or apparent discomfort    Cardiovascular:      Rate and Rhythm: Normal rate and regular rhythm.      Pulses: Normal pulses.      Heart sounds: Normal heart sounds. No murmur heard.     No friction rub. No gallop.      Comments: 2+ Radial and DP  Pulmonary:      Effort: Pulmonary effort is normal. No respiratory distress.      Breath sounds: Normal breath sounds. No stridor. No wheezing, rhonchi or rales.   Chest:      Chest wall: Tenderness present.   Abdominal:      General: Abdomen is flat. There is no distension.      Palpations: Abdomen is soft.      Tenderness: There is abdominal tenderness. There is no right CVA tenderness, left CVA tenderness, guarding or rebound.   Musculoskeletal:         General: Tenderness present. No deformity.      Cervical back: Neck supple. No tenderness. No muscular tenderness.      Comments: Tenderness to palpation of the midline T and L-spine.  Tenderness to palpation over multiple rib spaces on the right chest wall as well as over the right side of the pelvis and throughout the abdomen    No tenderness to palpation of the bilateral shoulders, elbows, arms, thighs, knees, legs.     Bruising on right shin   Lymphadenopathy:      Cervical: No cervical adenopathy.   Skin:     General: Skin is warm and dry.      Capillary Refill: Capillary refill takes less than 2 seconds.   Neurological:      Mental Status: She is alert.      Comments: Patient is speaking clearly in complete sentences.  Patient is answering appropriately and able follow commands.  Patient is moving  all four extremities spontaneously.  No facial droop.  Tongue midline.      Psychiatric:         Mood and Affect: Mood normal.         Behavior: Behavior normal.         Vital Signs  ED Triage Vitals   Temperature Pulse Respirations Blood Pressure SpO2   08/11/24 0842 08/11/24 0842 08/11/24 0842 08/11/24 0842 08/11/24 0842   97.9 °F (36.6 °C) (!) 125 (!) 24 131/75 96 %      Temp src Heart Rate Source Patient Position - Orthostatic VS BP Location FiO2 (%)   -- 08/11/24 0842 08/11/24 0945 08/11/24 0945 --    Monitor Lying Right arm       Pain Score       08/11/24 1235       3           Vitals:    08/11/24 1529 08/11/24 1544 08/11/24 1556 08/11/24 1734   BP: (!) 180/75 162/60 168/70 170/70   Pulse: 86 87     Patient Position - Orthostatic VS:  Lying Sitting Sitting         Visual Acuity  Visual Acuity      Flowsheet Row Most Recent Value   L Pupil Size (mm) 2   R Pupil Size (mm) 2   L Pupil Shape Round   R Pupil Shape Round            ED Medications  Medications   ondansetron (ZOFRAN) injection 4 mg (4 mg Intravenous Given 8/11/24 1502)   multi-electrolyte (PLASMALYTE-A/ISOLYTE-S PH 7.4) IV solution (50 mL/hr Intravenous New Bag 8/11/24 1227)   heparin (porcine) subcutaneous injection 5,000 Units (5,000 Units Subcutaneous Given 8/11/24 1502)   gabapentin (NEURONTIN) capsule 100 mg (has no administration in time range)   acetaminophen (TYLENOL) tablet 975 mg (975 mg Oral Given 8/11/24 1502)   oxyCODONE (ROXICODONE) split tablet 2.5 mg (has no administration in time range)     Or   oxyCODONE (ROXICODONE) IR tablet 5 mg (has no administration in time range)   HYDROmorphone HCl (DILAUDID) injection 0.2 mg (has no administration in time range)   naloxone (NARCAN) 0.04 mg/mL syringe 0.04 mg (has no administration in time range)   senna-docusate sodium (SENOKOT S) 8.6-50 mg per tablet 1 tablet (has no administration in time range)   polyethylene glycol (MIRALAX) packet 17 g (17 g Oral Given 8/11/24 9870)   lidocaine  (LIDODERM) 5 % patch 1 patch (1 patch Topical Medication Applied 8/11/24 1227)   sodium chloride 0.9 % bolus 500 mL (0 mL Intravenous Stopped 8/11/24 1054)   ceftriaxone (ROCEPHIN) 1 g/50 mL in dextrose IVPB (0 mg Intravenous Stopped 8/11/24 1035)   ondansetron (ZOFRAN) injection 4 mg (4 mg Intravenous Given 8/11/24 0901)       Diagnostic Studies  Results Reviewed       Procedure Component Value Units Date/Time    UA w Reflex to Microscopic w Reflex to Culture [[id number]]  (Abnormal) Collected: 08/11/24 1726    Lab Status: Final result Specimen: Urine, Clean Catch Updated: 08/11/24 1756     Color, UA Light Yellow     Clarity, UA Clear     Specific Gravity, UA 1.022     pH, UA 5.5     Leukocytes, UA Large     Nitrite, UA Negative     Protein, UA 70 (1+) mg/dl      Glucose, UA Negative mg/dl      Ketones, UA Negative mg/dl      Urobilinogen, UA <2.0 mg/dl      Bilirubin, UA Negative     Occult Blood, UA Small    HS Troponin I 4hr [[id number]]  (Normal) Collected: 08/11/24 1238    Lab Status: Final result Specimen: Blood from Arm, Right Updated: 08/11/24 1312     hs TnI 4hr 32 ng/L      Delta 4hr hsTnI -1 ng/L     Blood culture #1 [[id number]] Collected: 08/11/24 0907    Lab Status: Preliminary result Specimen: Blood from Arm, Left Updated: 08/11/24 1201     Blood Culture Received in Microbiology Lab. Culture in Progress.    Blood culture #2 [[id number]] Collected: 08/11/24 0907    Lab Status: Preliminary result Specimen: Blood from Arm, Right Updated: 08/11/24 1201     Blood Culture Received in Microbiology Lab. Culture in Progress.    Lactic acid 2 Hours [[id number]]  (Normal) Collected: 08/11/24 1121    Lab Status: Final result Specimen: Blood from Arm, Right Updated: 08/11/24 1201     LACTIC ACID 1.0 mmol/L     Narrative:      Result may be elevated if tourniquet was used during collection.    HS Troponin I 2hr [[id number]]  (Normal) Collected: 08/11/24 1121    Lab Status: Final result Specimen: Blood from Arm, Right  Updated: 08/11/24 1152     hs TnI 2hr 30 ng/L      Delta 2hr hsTnI -3 ng/L     Lipase [[id number]]  (Abnormal) Collected: 08/11/24 0907    Lab Status: Final result Specimen: Blood from Arm, Left Updated: 08/11/24 1112     Lipase 10 u/L     Procalcitonin [[id number]]  (Normal) Collected: 08/11/24 0907    Lab Status: Final result Specimen: Blood from Arm, Left Updated: 08/11/24 0946     Procalcitonin 0.14 ng/ml     HS Troponin 0hr (reflex protocol) [[id number]]  (Normal) Collected: 08/11/24 0907    Lab Status: Final result Specimen: Blood from Arm, Left Updated: 08/11/24 0941     hs TnI 0hr 33 ng/L     Protime-INR [[id number]]  (Normal) Collected: 08/11/24 0907    Lab Status: Final result Specimen: Blood from Arm, Left Updated: 08/11/24 0940     Protime 13.8 seconds      INR 0.99    Narrative:      INR Therapeutic Range    Indication                                             INR Range      Atrial Fibrillation                                               2.0-3.0  Hypercoagulable State                                    2.0.2.3  Left Ventricular Asist Device                            2.0-3.0  Mechanical Heart Valve                                  -    Aortic(with afib, MI, embolism, HF, LA enlargement,    and/or coagulopathy)                                     2.0-3.0 (2.5-3.5)     Mitral                                                             2.5-3.5  Prosthetic/Bioprosthetic Heart Valve               2.0-3.0  Venous thromboembolism (VTE: VT, PE        2.0-3.0    APTT [[id number]]  (Normal) Collected: 08/11/24 0907    Lab Status: Final result Specimen: Blood from Arm, Left Updated: 08/11/24 0940     PTT 27 seconds     Lactic acid [[id number]]  (Abnormal) Collected: 08/11/24 0907    Lab Status: Final result Specimen: Blood from Arm, Left Updated: 08/11/24 0938     LACTIC ACID 3.3 mmol/L     Narrative:      Result may be elevated if tourniquet was used during collection.    Comprehensive metabolic panel [[id number]]   (Abnormal) Collected: 08/11/24 0907    Lab Status: Final result Specimen: Blood from Arm, Left Updated: 08/11/24 0934     Sodium 140 mmol/L      Potassium 3.7 mmol/L      Chloride 105 mmol/L      CO2 23 mmol/L      ANION GAP 12 mmol/L      BUN 29 mg/dL      Creatinine 1.15 mg/dL      Glucose 152 mg/dL      Calcium 9.4 mg/dL      AST 19 U/L      ALT 19 U/L      Alkaline Phosphatase 62 U/L      Total Protein 7.8 g/dL      Albumin 4.2 g/dL      Total Bilirubin 0.87 mg/dL      eGFR 39 ml/min/1.73sq m     Narrative:      [employer] guidelines for Chronic Kidney Disease (CKD):     Stage 1 with normal or high GFR (GFR > 90 mL/min/1.73 square meters)    Stage 2 Mild CKD (GFR = 60-89 mL/min/1.73 square meters)    Stage 3A Moderate CKD (GFR = 45-59 mL/min/1.73 square meters)    Stage 3B Moderate CKD (GFR = 30-44 mL/min/1.73 square meters)    Stage 4 Severe CKD (GFR = 15-29 mL/min/1.73 square meters)    Stage 5 End Stage CKD (GFR <15 mL/min/1.73 square meters)  Note: GFR calculation is accurate only with a steady state creatinine    CK [[id number]]  (Normal) Collected: 08/11/24 0907    Lab Status: Final result Specimen: Blood from Arm, Left Updated: 08/11/24 0934     Total CK 50 U/L     CBC and differential [[id number]]  (Abnormal) Collected: 08/11/24 0907    Lab Status: Final result Specimen: Blood from Arm, Left Updated: 08/11/24 0923     WBC 14.60 Thousand/uL      RBC 4.27 Million/uL      Hemoglobin 13.6 g/dL      Hematocrit 42.5 %       fL      MCH 31.9 pg      MCHC 32.0 g/dL      RDW 13.7 %      MPV 10.3 fL      Platelets 299 Thousands/uL      nRBC 0 /100 WBCs      Segmented % 88 %      Immature Grans % 1 %      Lymphocytes % 7 %      Monocytes % 4 %      Eosinophils Relative 0 %      Basophils Relative 0 %      Absolute Neutrophils 12.84 Thousands/µL      Absolute Immature Grans 0.09 Thousand/uL      Absolute Lymphocytes 1.03 Thousands/µL      Absolute Monocytes 0.62 Thousand/µL       Eosinophils Absolute 0.00 Thousand/µL      Basophils Absolute 0.02 Thousands/µL                    XR tibia fibula 2 views RIGHT   ED Interpretation by Alena Jiménez MD (08/11 1001)   Per my independent interpretation: No acute osseous abnormality      Final Result by Reshma Camacho MD (08/11 1102)      No acute osseous abnormality.            Computerized Assisted Algorithm (CAA) may have been used to analyze all applicable images.               Workstation performed: [id number]         CT head without contrast   Final Result by E. Alec Schoenberger, MD (08/11 0956)      No acute intracranial abnormality. Stable chronic microangiopathy.                  Workstation performed: [id number]         CT spine cervical without contrast   Final Result by E. Alec Schoenberger, MD (08/11 0956)      No cervical spine fracture or traumatic malalignment.                  Workstation performed: [id number]         CT chest abdomen pelvis wo contrast   Final Result by E. Alec Schoenberger, MD (08/11 1028)   Transitional, sacralized L5 vertebra   Moderate T9 superior plate compression fracture that could be acute or possibly subacute. Minimal bone anteropulsion without significant canal stenosis   Age-indeterminate mild superior plate compression fracture at T9.   Infiltrative changes surrounding the head of the pancreas concerning for pancreatitis. Correlate with lipase.   No other acute intrathoracic or intra-abdominal pathology.         I personally discussed this study with ALENA JIMÉNEZ on 8/11/2024 10:26 AM.            Workstation performed: [id number]         XR spine thoracic 3 vw    (Results Pending)              Procedures  CriticalCare Time    Date/Time: 8/11/2024 6:22 PM    Performed by: Alena Jiménez MD  Authorized by: Alena Jiménez MD    Critical care provider statement:     Critical care time (minutes):  33    Critical care start time:  8/11/2024 9:07 AM    Critical care end time:   8/11/2024 9:40 AM    Critical care time was exclusive of:  Separately billable procedures and treating other patients and teaching time    Critical care was necessary to treat or prevent imminent or life-threatening deterioration of the following conditions:  Shock, circulatory failure and respiratory failure    Critical care was time spent personally by me on the following activities:  Obtaining history from patient or surrogate, development of treatment plan with patient or surrogate, discussions with consultants, evaluation of patient's response to treatment, examination of patient, ordering and performing treatments and interventions, ordering and review of laboratory studies, ordering and review of radiographic studies, re-evaluation of patient's condition and review of old charts    I assumed direction of critical care for this patient from another provider in my specialty: no             ED Course  ED Course as of 08/11/24 1822   Sun Aug 11, 2024   0855 ECG per my independent interpretation: Tachycardic rate, regular rhythm, PAC ectopy, normal axis, no ST elevations    0959 hs TnI 0hr: 33  Elevated    0959 Total CK: 50  WNL   1000 Creatinine: 1.15  Improved from prior    1005 Patient is DNR/DNI per discussion with patient/family.   1005 Patient still not producing urine   1112 Patient will be admitted to the trauma service.  Patient reevaluated and her mental status is unchanged               HEART Risk Score      Flowsheet Row Most Recent Value   Heart Score Risk Calculator    History 1 Filed at: 08/11/2024 1026   ECG 1 Filed at: 08/11/2024 1026   Age 2 Filed at: 08/11/2024 1026   Risk Factors 1 Filed at: 08/11/2024 1026   Troponin 0 Filed at: 08/11/2024 1026   HEART Score 5 Filed at: 08/11/2024 1026                       Initial Sepsis Screening       Row Name 08/11/24 1818 08/11/24 1005 08/11/24 0907          Is the patient's history suggestive of a new or worsening infection? Yes (Proceed)  -CL Yes  "(Proceed)  -CL Yes (Proceed)  -CL      Suspected source of infection suspect infection, source unknown  -CL suspect infection, source unknown  -CL suspect infection, source unknown  -CL      Indicate SIRS criteria Tachycardia > 90 bpm;Tachypnea > 20 resp per min  -CL Tachycardia > 90 bpm;Leukocytosis (WBC > 10336 IJL) OR Leukopenia (WBC <4000 IJL) OR Bandemia (WBC >10% bands)  -CL Tachycardia > 90 bpm;Tachypnea > 20 resp per min  -CL      Are two or more of the above signs & symptoms of infection both present and new to the patient? Yes (Proceed)  -CL Yes (Proceed)  -CL Yes (Proceed)  -CL      Assess for evidence of organ dysfunction: Are any of the below criteria present within 6 hours of suspected infection and SIRS criteria that are NOT considered to be chronic conditions? Lactate > 2.0  -CL Lactate > 2.0  -CL Lactate > 2.0  -CL      Date of presentation of severe sepsis 08/11/24  -CL 08/11/24  -CL 08/11/24  -CL      Time of presentation of severe sepsis 0907  -CL 1005  -CL 0907  -CL      Sepsis Note: Click \"NEXT\" below (NOT \"close\") to generate sepsis note based on above information. YES (proceed by clicking \"NEXT\")  -CL YES (proceed by clicking \"NEXT\")  -CL YES (proceed by clicking \"NEXT\")  -CL                User Key  (r) = Recorded By, (t) = Taken By, (c) = Cosigned By      Initials Name Provider Type    CL Cortes Jiménez MD Physician                  Default Flowsheet Data (Last 720 Hours)       Sepsis Reassess       Row Name 08/11/24 1821 08/11/24 1006                Repeat Volume Status and Tissue Perfusion Assessment Performed    Date of Reassessment: 08/11/24  -CL 08/11/24  -CL       Time of Reassessment: 1112  -CL 1006  -CL       Sepsis Reassessment Note: Click \"NEXT\" below (NOT \"close\") to generate sepsis reassessment note. YES (proceed by clicking \"NEXT\")  -CL YES (proceed by clicking \"NEXT\")  -CL       Repeat Volume Status and Tissue Perfusion Assessment Performed -- --                 User " Key  (r) = Recorded By, (t) = Taken By, (c) = Cosigned By      Initials Name Provider Type    CL Cortes Jiménez MD Physician                                Medical Decision Making  Patient is a 89-year-old female, with a history significant for dementia and osteoarthritis per my review the medical record, who presents to the ED today for evaluation after fall.  Patient does remember falling and denies any prodromal symptoms.  She states she lost her footing causing her to fall over and strike her head.  Patient takes no anticoagulant medication.  Since the fall, patient has had constant and progressively worsening pain on her right chest wall and abdomen.  She reports difficulty walking as well as back pain and frequent vomiting as well.  Patient has been taking Tylenol, 2 to 3 pills daily, to remit her symptoms.  Touch/movement exacerbates her symptoms.  Patient's granddaughter, present in room and find collateral history, states patient is not confused.  On arrival, patient was tachycardic and tachypneic.  Her physical exam is notable for tenderness to palpation throughout the right chest wall and abdomen as well as the mid and lower spine.  Bruising along the right lower extremity.  This presentation is concerning for: Mechanical fall, ICH, electrolyte abnormality, TAMEKA, dehydration, ACS, rib/vertebral fracture, liver laceration.  Patient at risk for pneumonia/UTI.  Will investigate with CT head/C-spine/chest abdomen pelvis, cardiac workup, sepsis panel order set.  Will manage with fluids, antibiotics, antiemetics, further based upon workup/response.    Amount and/or Complexity of Data Reviewed  Labs: ordered. Decision-making details documented in ED Course.  Radiology: ordered and independent interpretation performed.    Risk  Prescription drug management.  Decision regarding hospitalization.                 Disposition  Final diagnoses:   Thoracic compression fracture (HCC)   Fall, initial encounter    Pancreatic lesion   Elevated blood pressure reading   Chest pain   Dyspnea   SIRS (systemic inflammatory response syndrome) (HCC)     Time reflects when diagnosis was documented in both MDM as applicable and the Disposition within this note       Time User Action Codes Description Comment    8/11/2024 10:26 AM Legare, Edgarer A Add [S22.000A] Thoracic compression fracture (HCC)     8/11/2024 10:26 AM Legare, Christopher A Add [W19.XXXA] Fall, initial encounter     8/11/2024 10:26 AM Legare, Christopher A Add [K86.9] Pancreatic lesion     8/11/2024 10:26 AM Legare, Christopher A Add [R03.0] Elevated blood pressure reading     8/11/2024 10:26 AM Legare, Christopher A Add [R07.9] Chest pain     8/11/2024 10:26 AM Legare, Christopher A Add [R06.00] Dyspnea     8/11/2024 10:26 AM Legare, Christopher A Add [R65.10] SIRS (systemic inflammatory response syndrome) ([facility])           ED Disposition       ED Disposition   Admit    Condition   Stable    Date/Time   Sun Aug 11, 2024 1053    Comment   Case was discussed with  and the patient's admission status was agreed to be  to the service of   .               Follow-up Information    None         Current Discharge Medication List        CONTINUE these medications which have NOT CHANGED    Details   !! acetaminophen (MAPAP EXTRA STRENGTH) 500 mg tablet Take 500 mg by mouth      amLODIPine (NORVASC) 2.5 mg tablet Take 2.5 mg by mouth daily      donepezil (ARICEPT) 10 mg tablet TAKE ONE TABLET BY MOUTH NIGHTLY (DEMENTIA)..      furosemide (LASIX) 20 mg tablet TAKE ONE TABLET BY MOUTH EVERY DAY (EDEMA)      mirtazapine (REMERON) 7.5 MG tablet TAKE ONE TABLET BY MOUTH NIGHTLY (INSOMNIA)      sertraline (ZOLOFT) 50 mg tablet TAKE ONE TABLET BY MOUTH EVERY DAY (DEPRESSION)      !! acetaminophen (TYLENOL) 500 mg tablet Take 1 tablet (500 mg total) by mouth every 6 (six) hours as needed for moderate pain  Qty: 30 tablet, Refills: 0    Associated Diagnoses: Fall, initial  encounter      guaiFENesin (ROBITUSSIN) 100 MG/5ML oral liquid Take 200 mg by mouth Three times daily as needed      loperamide (IMODIUM) 2 mg capsule 1 cap every day as needed       !! - Potential duplicate medications found. Please discuss with provider.          No discharge procedures on file.    PDMP Review       None            ED Provider  Electronically Signed by             Cortes Jiménez MD  08/11/24 1446

## 2024-08-11 NOTE — H&P
"H&P - Trauma   Oliva Dc 89 y.o. female MRN: [id number]  Unit/Bed#: ED-36 Encounter: [id number]    Trauma Alert: Evaluation; trauma team notified at 10:30am via text   Model of Arrival: Ambulance    Trauma Team: Attending CATE Pruett: Clair  Consultants:     Neurosurgery: routine consult; Epic consult order placed;     Assessment & Plan   Active Problems / Assessment:   1. T9 Compression fracture  2. Lactic Acidosis  3. SIRS  4. Multiple falls  5. History of dementia  6. History of HTN     Plan:   -Admit with neurosurgical consultation  -PT and OT  -Thoracic spine precautions  -Follow-up geriatric consultation  -Patient POA/granddaughter updated at bedside; she will bring in home medication list  -Continue multimodal pain therapy  -Appreciate ED workup and continue workup for any evidence of sepsis  -Reviewed laboratory studies; lactic acidosis cleared  -troponin downtrending  -Will need a.m. labs  -UA pending  -CT scan reviewed and discussed with attending    Disposition: [facility]  Follow-up PT and OT consultations.  Neurosurgical team consulted.  Notified via in2nite secure chat; a.m. labs.  Family to bring in home medication list.    History of Present Illness     Chief Complaint: \"My back hurts.\"  Mechanism:Fall     HPI:    Oliva Dc is a 89 y.o. female who presents with status post fall.  Brought in by family after 3 to/4 days since fall with worsening back pain.  Concern over possible sepsis as well in the ER noted to have elevated lactic and white count.  Patient complaining of back pain.  No new numbness or tingling.  Moving all extremities.  No noted anticoagulants or antiplatelet agent.  Family/granddaughter/which is her POA accompanies the patient at bedside.  Patient with no other complaints today on presentation.  No nausea or vomiting.  No abdominal pain.  No chest pain or shortness of breath.  Has had difficulty with ambulation secondary to the pain in the back.  No fevers, " chills, sweats.    Review of Systems   All other systems reviewed and are negative.    12-point, complete review of systems was reviewed and negative except as stated above.     Historical Information     History reviewed. No pertinent past medical history.  History reviewed. No pertinent surgical history.        Immunization History   Administered Date(s) Administered    COVID-19 PFIZER VACCINE 0.3 ML IM 04/30/2021    COVID-19 Pfizer mRNA vacc PF bc-sucrose 12 yr and older (Comirnaty) 12/11/2023    Tdap 09/01/2019     Last Tetanus: up to date  Family History: Non-contributory    1. Before the illness or injury that brought you to the Emergency, did you need someone to help you on a regular basis? 1=Yes   2. Since the illness or injury that brought you to the Emergency, have you needed more help than usual to take care of yourself? 1=Yes   3. Have you been hospitalized for one or more nights during the past 6 months (excluding a stay in the Emergency Department)? 0=No   4. In general, do you see well? 0=Yes   5. In general, do you have serious problems with your memory? 1=Yes   6. Do you take more than three different medications everyday? 1=Yes   TOTAL   4     Did you order a geriatric consult if the score was 2 or greater?: yes     Meds/Allergies   PTA meds:   Prior to Admission Medications   Prescriptions Last Dose Informant Patient Reported? Taking?   acetaminophen (MAPAP EXTRA STRENGTH) 500 mg tablet   Yes No   Sig: Take 500 mg by mouth   acetaminophen (TYLENOL) 500 mg tablet   No No   Sig: Take 1 tablet (500 mg total) by mouth every 6 (six) hours as needed for moderate pain   donepezil (ARICEPT) 10 mg tablet   Yes No   Sig: TAKE ONE TABLET BY MOUTH NIGHTLY (DEMENTIA)..   furosemide (LASIX) 20 mg tablet   Yes No   Sig: TAKE ONE TABLET BY MOUTH EVERY DAY (EDEMA)   guaiFENesin (ROBITUSSIN) 100 MG/5ML oral liquid   Yes No   Sig: Take 200 mg by mouth Three times daily as needed   loperamide (IMODIUM) 2 mg capsule    Yes No   Si cap every day as needed   mirtazapine (REMERON) 7.5 MG tablet   Yes No   Sig: TAKE ONE TABLET BY MOUTH NIGHTLY (INSOMNIA)   sertraline (ZOLOFT) 50 mg tablet   Yes No   Sig: TAKE ONE TABLET BY MOUTH EVERY DAY (DEPRESSION)      Facility-Administered Medications: None        Allergies   Allergen Reactions    Penicillins     Sulfa Antibiotics Other (See Comments)       Objective   Initial Vitals:   Temperature: 97.9 °F (36.6 °C) (24)  Pulse: (!) 125 (24)  Respirations: (!) 24 (24)  Blood Pressure: 131/75 (24)    Primary Survey:   Airway:        Status: patent;        Pre-hospital Interventions: none        Hospital Interventions: none  Breathing:        Pre-hospital Interventions: none       Effort: normal       Right breath sounds: normal       Left breath sounds: normal  Circulation:        Rhythm: regular       Rate: regular   Right Pulses Left Pulses    R radial: 2+  R femoral: 2+  R pedal: 2+  R carotid: 2+  R popliteal: 2+ L radial: 2+  L femoral: 2+  L pedal: 2+  L carotid: 2+  L popliteal: 2+   Disability:        GCS: Eye: 4; Verbal: 5 Motor: 6 Total: 15       Right Pupil: round;  reactive         Left Pupil:  round;  reactive      R Motor Strength L Motor Strength    R : 5/5  R dorsiflex: 5/5  R plantarflex: 5/5 L : 5/5  L dorsiflex: 5/5  L plantarflex: 5/5        Sensory:  No sensory deficit  Exposure:       Completed: Yes      Secondary Survey:  Physical Exam  Vitals reviewed.   Constitutional:       Appearance: Normal appearance.   HENT:      Head: Normocephalic.      Right Ear: External ear normal.      Left Ear: External ear normal.      Mouth/Throat:      Pharynx: Oropharynx is clear.   Eyes:      Conjunctiva/sclera: Conjunctivae normal.   Cardiovascular:      Rate and Rhythm: Normal rate and regular rhythm.      Pulses: Normal pulses.      Heart sounds: Normal heart sounds.   Pulmonary:      Effort: Pulmonary effort is normal.       Breath sounds: Normal breath sounds. No stridor.   Chest:      Chest wall: No tenderness.   Abdominal:      General: There is no distension.      Palpations: Abdomen is soft.      Tenderness: There is no abdominal tenderness. There is no guarding.   Musculoskeletal:         General: No swelling or tenderness. Normal range of motion.      Cervical back: Normal range of motion. No tenderness.      Comments: Thoracic and lumbar spine tenderness.   Skin:     General: Skin is warm and dry.   Neurological:      General: No focal deficit present.      Mental Status: She is alert. Mental status is at baseline.       Invasive Devices       Peripheral Intravenous Line  Duration             Peripheral IV 08/11/24 Left;Ventral (anterior) Forearm <1 day    Peripheral IV 08/11/24 Right;Ventral (anterior) Forearm <1 day                  Lab Results: I have personally reviewed all pertinent laboratory/test results 08/11/24 and in the preceding 24 hours.  Recent Labs     08/11/24  0907   WBC 14.60*   HGB 13.6   HCT 42.5      SODIUM 140   K 3.7      CO2 23   BUN 29*   CREATININE 1.15   GLUC 152*   AST 19   ALT 19   ALB 4.2   TBILI 0.87   ALKPHOS 62   PTT 27   INR 0.99   HSTNI0 33   LACTICACID 3.3*       Imaging Results: I have personally reviewed pertinent images saved in PACS. CT scan findings (and other pertinent positive findings on images) were discussed with radiology. My interpretation of the images/reports are as follows:  Chest Xray(s): negative for acute findings   FAST exam(s): N/A   CT Scan(s): positive for acute findings: t9 fracture   Additional Xray(s): negative for acute findings     Other Studies: no other studies    Code Status: Level 3 - DNAR and DNI  Advance Directive and Living Will:      Power of :    POLST:

## 2024-08-11 NOTE — PHYSICAL THERAPY NOTE
PHYSICAL THERAPY EVALUATION NOTE    Patient Name: Oliva Dc  Today's Date: 8/11/2024  AGE:   89 y.o.  Mrn:   [id number]  ADMIT DX:  Chest pain [R07.9]  Elevated blood pressure reading [R03.0]  Dyspnea [R06.00]  SIRS (systemic inflammatory response syndrome) (HCC) [R65.10]  Thoracic compression fracture (HCC) [S22.000A]  Pancreatic lesion [K86.9]  Fall, initial encounter [W19.XXXA]  Unspecified multiple injuries, initial encounter [T07.XXXA]    History reviewed. No pertinent past medical history.  Length Of Stay: 0  PHYSICAL THERAPY EVALUATION :    08/11/24 1551   PT Last Visit   PT Visit Date 08/11/24   Pain Assessment   Pain Assessment Tool FLACC   Pain Location/Orientation Location: Back   [facility] Pain Intervention(s) Repositioned;Ambulation/increased activity;Other (Comment)  (pt was provided w/ backpack TLSO)   Pain Rating: FLACC (Rest) - Face 0   Pain Rating: FLACC (Rest) - Legs 0   Pain Rating: FLACC (Rest) - Activity 0   Pain Rating: FLACC (Rest) - Cry 0   Pain Rating: FLACC (Rest) - Consolability 0   Score: FLACC (Rest) 0   Pain Rating: FLACC (Activity) - Face 1   Pain Rating: FLACC (Activity) - Legs 0   Pain Rating: FLACC (Activity) - Activity 0   Pain Rating: FLACC (Activity) - Cry 1   Pain Rating: FLACC (Activity) - Consolability 1   Score: FLACC (Activity) 3   Restrictions/Precautions   Braces or Orthoses Other (Comment)  (backpack TLSO - when upright and at 45* head of bed)   Other Precautions Chair Alarm;Bed Alarm;Multiple lines;Fall Risk;Pain;Hard of hearing   Home Living   Type of Home House   Home Layout One level;Other (Comment)  (1 ANA)   Additional Comments lives w/ daughter. ambulates w/o device. independent w/ most ADLs. receives assist w/ IADLs. + fall history.   General   Additional Pertinent History blood pressure supine 162/60, seated 168/70. room air resting pulse ox 94% and 72 BPM, active 92% and 85  BPM.   Family/Caregiver Present Yes   Cognition   Arousal/Participation Alert   Orientation Level Oriented to person;Oriented to place;Other (Comment)  (pt was identified w/ full name, birth date)   Following Commands Follows one step commands with increased time or repetition   Subjective   Subjective pt seen supine in bed w/ family present. agreed to PT eval. denied pain at rest. states having back pain w/ activity.   RUE Assessment   RUE Assessment WFL   LUE Assessment   LUE Assessment WFL   RLE Assessment   RLE Assessment WFL  (3+ to 4-/5)   LLE Assessment   LLE Assessment WFL  (3+ to 4-/5)   Light Touch   RLE Light Touch Grossly intact   LLE Light Touch Grossly intact   Bed Mobility   Rolling R 4  Minimal assistance   Additional items Assist x 1;HOB elevated;Bedrails;Increased time required;Verbal cues;LE management  (for bedrail use, LE positioning)   Rolling L 4  Minimal assistance   Additional items Assist x 1;Bedrails;Increased time required;Verbal cues;LE management  (for bedrail use, LE positioning)   Supine to Sit 4  Minimal assistance  (log roll)   Additional items Assist x 1;Bedrails;Increased time required;Verbal cues;LE management  (for trunk/LE positioning)   Additional Comments + nausea w/ sitting. dissipated after short time.   Transfers   Sit to Stand 4  Minimal assistance   Additional items Assist x 1;Increased time required;Verbal cues  (for hand placement)   Stand to Sit 4  Minimal assistance   Additional items Assist x 1;Increased time required;Verbal cues  (for body positioning, hand placement)   Additional Comments pt donned backpack TLSO in sitting position on edge of bed   Ambulation/Elevation   Gait pattern Foward flexed;Short stride;Excessively slow   Gait Assistance 4  Minimal assist   Additional items Assist x 1;Verbal cues;Tactile cues  (for walker positioning, full step length)   Assistive Device Rolling walker   Distance 4 feet  (additional ambulation not possible due to fatigue,  pain)   Stair Management Assistance Not tested  (due to limited ambulation tolerance, safety concern)   Balance   Static Sitting Fair +   Static Standing Poor +  (w/ roller walker)   Ambulatory Poor +  (w/ roller walker)   Activity Tolerance   Activity Tolerance Patient limited by fatigue;Patient limited by pain   Nurse Made Aware spoke to Earline Carrasco   Assessment   Problem List Decreased strength;Decreased endurance;Decreased mobility;Impaired balance;Decreased safety awareness;Pain;Orthopedic restrictions;Impaired hearing   Assessment Pt presents status post fall. Brought in by family after 3 to 4 days since fall with worsening back pain. Dx: T9 compression fx, lactic acidosis, SIRS, and multiple falls. order placed for PT eval and tx, w/ activity order of up in chair and TLSO when upright and at 45* head of bed. pt presents w/ comorbidities of HTN, dementia, OA, CKD, cervicalgia, and hyperlipidemia and personal factors of advanced age, stair(s) to enter home, positive fall history, hearing impairments, and inability to perform IADLs. pt presents w/ pain, weakness, decreased endurance, impaired balance, gait deviations, impaired hearing, decreased safety awareness, orthopedic restrictions, and fall risk. these impairments are evident in findings from physical examination (weakness), mobility assessment (need for min assist w/ all phases of mobility when usually mobilizing independently, tolerance to only 3 feet of ambulation, and need for cueing for mobility technique), and Barthel Index: 50/100. pt needed input for task focus and mobility technique/body mechanics. pt is at risk for falls due to physical and safety awareness deficits. pt's clinical presentation is unstable/unpredictable (evident in poor blood pressure control, need for assist w/ all phases of mobility when usually mobilizing independently, tolerance to only 3 feet of ambulation, pain impacting overall mobility status, and need for input for  mobility technique/safety). pt needs inpatient PT tx to improve mobility deficits and progress mobility training as appropriate.   Goals   Patient Goals I want to go home   STG Expiration Date 08/25/24   Short Term Goal #1 pt will: Increase bilateral LE strength 1/2 grade to facilitate independent mobility, Perform bed mobility modified independent to increase level of independence, Perform all transfers modified independent to improve independence, Ambulate 150 ft. with least restrictive assistive device w/ supervision w/o LOB to improve functional independence, Navigate 1 stair(s) w/ minx1 with unilateral handrail to facilitate return to previous living environment, Increase ambulatory balance 1 grade to decrease risk for falls, Complete exercise program independently to increase strength and endurance, Tolerate 3 hr OOB to faciliate upright tolerance, Improve Barthel Index score to 70 or greater to facilitate independence, Don and doff LSO and make adjustments to QuickDraws w/ supervision to improve level of independence, and Complete Timed Up and Go or Comfortable Gait Speed to further assess mobility and monitor progress   PT Treatment Day 1   Plan   Treatment/Interventions Functional transfer training;LE strengthening/ROM;Elevations;Therapeutic exercise;Endurance training;Gait training;Bed mobility;Equipment eval/education;Patient/family training   PT Frequency 3-5x/wk   Discharge Recommendation   Rehab Resource Intensity Level, PT II (Moderate Resource Intensity)   AM-PAC Basic Mobility Inpatient   Turning in Flat Bed Without Bedrails 3   Lying on Back to Sitting on Edge of Flat Bed Without Bedrails 2   Moving Bed to Chair 3   Standing Up From Chair Using Arms 3   Walk in Room 3   Climb 3-5 Stairs With Railing 1   Basic Mobility Inpatient Raw Score 15   Basic Mobility Standardized Score 36.97   [facility] Highest Level Of Mobility   [facility] Goal 4: Move to chair/commode   -Health system Achieved 5: Stand (1 or more  minutes)   Barthel Index   Feeding 10   Bathing 0   Grooming Score 5   Dressing Score 0   Bladder Score 10   Bowels Score 10   Toilet Use Score 5   Transfers (Bed/Chair) Score 10   Mobility (Level Surface) Score 0   Stairs Score 0   Barthel Index Score 50   Additional Treatment Session   Start Time 1551   End Time 1601   Treatment Assessment pt agreed to participate in PT intervention. pt was sized for and provided w/ backpack TLSO per Trauma orders. brace was sized for waist size 2. therapist provided education to pt including indications for use, donning/doffing technique, adjustments w/ quickdraws, need for layer of clothing under brace, and need for skin checks. therapist also reviewed body mechanics (minimizing bending and twisting, log rolling for bed mobility, no lifting more than 8 to 10 lbs). pt had inconsistent carryover of education provided (via verbal instruction, demonstration, teachback, and handout). pt needed max assist for donning and doffing brace. mod assist was also needed for adjusting quickdraws. follow up is needed to improve independence w/ brace management and improve comprehension of body mechanics and activity restrictions.   Equipment Use backpack TLSO   Additional Treatment Day 1   End of Consult   Patient Position at End of Consult Bedside chair;Bed/Chair alarm activated;All needs within reach   End of Consult Comments (S)  contacted Clayton ESPOSITO prior to session. stated pt is appropriate to receive TLSO and complete PT eval prior to Neurosurgery consult being formally completed.     The patient's AM-PAC Basic Mobility Inpatient Short Form Raw Score is 15. A Raw score of less than or equal to 16 suggests the patient may benefit from discharge to post-acute rehabilitation services. Please also refer to the recommendation of the Physical Therapist for safe discharge planning.    Skilled PT recommended while in hospital and upon DC to progress pt toward treatment goals.     Scott Galarza  PT

## 2024-08-11 NOTE — PHYSICAL THERAPY NOTE
Physical Therapy Cancellation Note       08/11/24 7803   Note Type   Note type Cancelled Session   Cancel Reasons Other   Additional Comments referral received for PT eval and tx. pt is pending Neurosugery consult regarding T9 compression fx. will await completion of consult  and formal recommendations before performing eval.     Scott Galarza, PT

## 2024-08-11 NOTE — SEPSIS NOTE
"  Sepsis Note   Oliva Dc 89 y.o. female MRN: [id number]  Unit/Bed#: ED-36 Encounter: [id number]       Initial Sepsis Screening       Row Name 08/11/24 1005                Is the patient's history suggestive of a new or worsening infection? Yes (Proceed)  -CL        Suspected source of infection suspect infection, source unknown  -CL        Indicate SIRS criteria Tachycardia > 90 bpm;Leukocytosis (WBC > 28905 IJL) OR Leukopenia (WBC <4000 IJL) OR Bandemia (WBC >10% bands)  -CL        Are two or more of the above signs & symptoms of infection both present and new to the patient? Yes (Proceed)  -CL        Assess for evidence of organ dysfunction: Are any of the below criteria present within 6 hours of suspected infection and SIRS criteria that are NOT considered to be chronic conditions? Lactate > 2.0  -CL        Date of presentation of severe sepsis 08/11/24  -CL        Time of presentation of severe sepsis 1005  -CL        Sepsis Note: Click \"NEXT\" below (NOT \"close\") to generate sepsis note based on above information. YES (proceed by clicking \"NEXT\")  -CL                  User Key  (r) = Recorded By, (t) = Taken By, (c) = Cosigned By      Initials Name Provider Type    CL Cortes Jiménez MD Physician                    Default Flowsheet Data (Last 720 Hours)       Sepsis Reassess       Row Name 08/11/24 1006                   Repeat Volume Status and Tissue Perfusion Assessment Performed    Date of Reassessment: 08/11/24  -CL        Time of Reassessment: 1006  -CL        Sepsis Reassessment Note: Click \"NEXT\" below (NOT \"close\") to generate sepsis reassessment note. YES (proceed by clicking \"NEXT\")  -CL        Repeat Volume Status and Tissue Perfusion Assessment Performed --                  User Key  (r) = Recorded By, (t) = Taken By, (c) = Cosigned By      Initials Name Provider Type    CL Cortes Jiménez MD Physician                    Body mass index is 20.06 kg/m².  Wt Readings from Last 1 " Encounters:   08/11/24 46.6 kg (102 lb 11.8 oz)     IBW (Ideal Body Weight): 45.5 kg    Ideal body weight: 45.5 kg (100 lb 4.9 oz)  Adjusted ideal body weight: 45.9 kg (101 lb 4.5 oz)

## 2024-08-11 NOTE — SEPSIS NOTE
"  Sepsis Note   Oliva Dc 89 y.o. female MRN: [id number]  Unit/Bed#: ED-36 Encounter: [id number]       Initial Sepsis Screening       Row Name 08/11/24 1005                Is the patient's history suggestive of a new or worsening infection? Yes (Proceed)  -CL        Suspected source of infection suspect infection, source unknown  -CL        Indicate SIRS criteria Tachycardia > 90 bpm;Leukocytosis (WBC > 44219 IJL) OR Leukopenia (WBC <4000 IJL) OR Bandemia (WBC >10% bands)  -CL        Are two or more of the above signs & symptoms of infection both present and new to the patient? Yes (Proceed)  -CL        Assess for evidence of organ dysfunction: Are any of the below criteria present within 6 hours of suspected infection and SIRS criteria that are NOT considered to be chronic conditions? Lactate > 2.0  -CL        Date of presentation of severe sepsis 08/11/24  -CL        Time of presentation of severe sepsis 1005  -CL        Sepsis Note: Click \"NEXT\" below (NOT \"close\") to generate sepsis note based on above information. YES (proceed by clicking \"NEXT\")  -CL                  User Key  (r) = Recorded By, (t) = Taken By, (c) = Cosigned By      Initials Name Provider Type    CL Cortes Jiménez MD Physician                        Body mass index is 20.06 kg/m².  Wt Readings from Last 1 Encounters:   08/11/24 46.6 kg (102 lb 11.8 oz)     IBW (Ideal Body Weight): 45.5 kg    Ideal body weight: 45.5 kg (100 lb 4.9 oz)  Adjusted ideal body weight: 45.9 kg (101 lb 4.5 oz)    "

## 2024-08-11 NOTE — PLAN OF CARE
Problem: Potential for Falls  Goal: Patient will remain free of falls  Description: INTERVENTIONS:  - Educate patient/family on patient safety including physical limitations  - Instruct patient to call for assistance with activity   - Consult OT/PT to assist with strengthening/mobility   - Keep Call bell within reach  - Keep bed low and locked with side rails adjusted as appropriate  - Keep care items and personal belongings within reach  - Initiate and maintain comfort rounds  - Make Fall Risk Sign visible to staff  - Offer Toileting every 2 Hours, in advance of need  - Initiate/Maintain bedalarm  - Obtain necessary fall risk management equipment: chair  - Apply yellow socks and bracelet for high fall risk patients  - Consider moving patient to room near nurses station  Outcome: Progressing     Problem: PAIN - ADULT  Goal: Verbalizes/displays adequate comfort level or baseline comfort level  Description: Interventions:  - Encourage patient to monitor pain and request assistance  - Assess pain using appropriate pain scale  - Administer analgesics based on type and severity of pain and evaluate response  - Implement non-pharmacological measures as appropriate and evaluate response  - Consider cultural and social influences on pain and pain management  - Notify physician/advanced practitioner if interventions unsuccessful or patient reports new pain  Outcome: Progressing     Problem: INFECTION - ADULT  Goal: Absence or prevention of progression during hospitalization  Description: INTERVENTIONS:  - Assess and monitor for signs and symptoms of infection  - Monitor lab/diagnostic results  - Monitor all insertion sites, i.e. indwelling lines, tubes, and drains  - Monitor endotracheal if appropriate and nasal secretions for changes in amount and color  - Elizabeth appropriate cooling/warming therapies per order  - Administer medications as ordered  - Instruct and encourage patient and family to use good hand hygiene  technique  - Identify and instruct in appropriate isolation precautions for identified infection/condition  Outcome: Progressing  Goal: Absence of fever/infection during neutropenic period  Description: INTERVENTIONS:  - Monitor WBC    Outcome: Progressing     Problem: SAFETY ADULT  Goal: Patient will remain free of falls  Description: INTERVENTIONS:  - Educate patient/family on patient safety including physical limitations  - Instruct patient to call for assistance with activity   - Consult OT/PT to assist with strengthening/mobility   - Keep Call bell within reach  - Keep bed low and locked with side rails adjusted as appropriate  - Keep care items and personal belongings within reach  - Initiate and maintain comfort rounds  - Make Fall Risk Sign visible to staff  - Offer Toileting every 2 Hours, in advance of need  - Initiate/Maintain bedalarm  - Obtain necessary fall risk management equipment: chair  - Apply yellow socks and bracelet for high fall risk patients  - Consider moving patient to room near nurses station  Outcome: Progressing  Goal: Maintain or return to baseline ADL function  Description: INTERVENTIONS:  - Educate patient/family on patient safety including physical limitations  - Instruct patient to call for assistance with activity   - Consult OT/PT to assist with strengthening/mobility   - Keep Call bell within reach  - Keep bed low and locked with side rails adjusted as appropriate  - Keep care items and personal belongings within reach  - Initiate and maintain comfort rounds  - Make Fall Risk Sign visible to staff  - Offer Toileting every 2 Hours, in advance of need  - Initiate/Maintain bed alarm  - Obtain necessary fall risk management equipment: chair  - Apply yellow socks and bracelet for high fall risk patients  - Consider moving patient to room near nurses station  Outcome: Progressing  Goal: Maintains/Returns to pre admission functional level  Description: INTERVENTIONS:  -  Assess  patient's ability to carry out ADLs; assess patient's baseline for ADL function and identify physical deficits which impact ability to perform ADLs (bathing, care of mouth/teeth, toileting, grooming, dressing, etc.)  - Assess/evaluate cause of self-care deficits   - Assess range of motion  - Assess patient's mobility; develop plan if impaired  - Assess patient's need for assistive devices and provide as appropriate  - Encourage maximum independence but intervene and supervise when necessary  - Involve family in performance of ADLs  - Assess for home care needs following discharge   - Consider OT consult to assist with ADL evaluation and planning for discharge  - Provide patient education as appropriate  Outcome: Progressing     Problem: DISCHARGE PLANNING  Goal: Discharge to home or other facility with appropriate resources  Description: INTERVENTIONS:  - Identify barriers to discharge w/patient and caregiver  - Arrange for needed discharge resources and transportation as appropriate  - Identify discharge learning needs (meds, wound care, etc.)  - Arrange for interpretive services to assist at discharge as needed  - Refer to Case Management Department for coordinating discharge planning if the patient needs post-hospital services based on physician/advanced practitioner order or complex needs related to functional status, cognitive ability, or social support system  Outcome: Progressing     Problem: Knowledge Deficit  Goal: Patient/family/caregiver demonstrates understanding of disease process, treatment plan, medications, and discharge instructions  Description: Complete learning assessment and assess knowledge base.  Interventions:  - Provide teaching at level of understanding  - Provide teaching via preferred learning methods  Outcome: Progressing

## 2024-08-11 NOTE — SEPSIS NOTE
"  Sepsis Note   Oliva Dc 89 y.o. female MRN: [id number]  Unit/Bed#: W -01 Encounter: [id number]       Initial Sepsis Screening       Row Name 08/11/24 1818 08/11/24 1005 08/11/24 0907          Is the patient's history suggestive of a new or worsening infection? Yes (Proceed)  -CL Yes (Proceed)  -CL Yes (Proceed)  -CL      Suspected source of infection suspect infection, source unknown  -CL suspect infection, source unknown  -CL suspect infection, source unknown  -CL      Indicate SIRS criteria Tachycardia > 90 bpm;Tachypnea > 20 resp per min  -CL Tachycardia > 90 bpm;Leukocytosis (WBC > 85551 IJL) OR Leukopenia (WBC <4000 IJL) OR Bandemia (WBC >10% bands)  -CL Tachycardia > 90 bpm;Tachypnea > 20 resp per min  -CL      Are two or more of the above signs & symptoms of infection both present and new to the patient? Yes (Proceed)  -CL Yes (Proceed)  -CL Yes (Proceed)  -CL      Assess for evidence of organ dysfunction: Are any of the below criteria present within 6 hours of suspected infection and SIRS criteria that are NOT considered to be chronic conditions? Lactate > 2.0  -CL Lactate > 2.0  -CL Lactate > 2.0  -CL      Date of presentation of severe sepsis 08/11/24  -CL 08/11/24  -CL 08/11/24  -CL      Time of presentation of severe sepsis 0907  -CL 1005  -CL 0907  -CL      Sepsis Note: Click \"NEXT\" below (NOT \"close\") to generate sepsis note based on above information. YES (proceed by clicking \"NEXT\")  -CL YES (proceed by clicking \"NEXT\")  -CL YES (proceed by clicking \"NEXT\")  -CL                User Key  (r) = Recorded By, (t) = Taken By, (c) = Cosigned By      Initials Name Provider Type    CL Cortes Jiménez MD Physician                    Default Flowsheet Data (Last 720 Hours)       Sepsis Reassess       Row Name 08/11/24 1821 08/11/24 1006                Repeat Volume Status and Tissue Perfusion Assessment Performed    Date of Reassessment: 08/11/24  -CL 08/11/24  -CL       Time of " "Reassessment: 1112  - 1006  -CL       Sepsis Reassessment Note: Click \"NEXT\" below (NOT \"close\") to generate sepsis reassessment note. YES (proceed by clicking \"NEXT\")  -CL YES (proceed by clicking \"NEXT\")  -CL       Repeat Volume Status and Tissue Perfusion Assessment Performed -- --                 User Key  (r) = Recorded By, (t) = Taken By, (c) = Cosigned By      Initials Name Provider Type    CL Cortes Jiménez MD Physician                    Body mass index is 20.19 kg/m².  Wt Readings from Last 1 Encounters:   08/11/24 46.9 kg (103 lb 6.3 oz)     IBW (Ideal Body Weight): 45.5 kg    Ideal body weight: 45.5 kg (100 lb 4.9 oz)  Adjusted ideal body weight: 46.1 kg (101 lb 8.7 oz)    "

## 2024-08-11 NOTE — PLAN OF CARE
Problem: PHYSICAL THERAPY ADULT  Goal: Performs mobility at highest level of function for planned discharge setting.  See evaluation for individualized goals.  Description: Treatment/Interventions: Functional transfer training, LE strengthening/ROM, Elevations, Therapeutic exercise, Endurance training, Gait training, Bed mobility, Equipment eval/education, Patient/family training          See flowsheet documentation for full assessment, interventions and recommendations.  Note:    Problem List: Decreased strength, Decreased endurance, Decreased mobility, Impaired balance, Decreased safety awareness, Pain, Orthopedic restrictions, Impaired hearing  Assessment: Pt presents status post fall. Brought in by family after 3 to 4 days since fall with worsening back pain. Dx: T9 compression fx, lactic acidosis, SIRS, and multiple falls. order placed for PT eval and tx, w/ activity order of up in chair and TLSO when upright and at 45* head of bed. pt presents w/ comorbidities of HTN, dementia, OA, CKD, cervicalgia, and hyperlipidemia and personal factors of advanced age, stair(s) to enter home, positive fall history, hearing impairments, and inability to perform IADLs. pt presents w/ pain, weakness, decreased endurance, impaired balance, gait deviations, impaired hearing, decreased safety awareness, orthopedic restrictions, and fall risk. these impairments are evident in findings from physical examination (weakness), mobility assessment (need for min assist w/ all phases of mobility when usually mobilizing independently, tolerance to only 3 feet of ambulation, and need for cueing for mobility technique), and Barthel Index: 50/100. pt needed input for task focus and mobility technique/body mechanics. pt is at risk for falls due to physical and safety awareness deficits. pt's clinical presentation is unstable/unpredictable (evident in poor blood pressure control, need for assist w/ all phases of mobility when usually  mobilizing independently, tolerance to only 3 feet of ambulation, pain impacting overall mobility status, and need for input for mobility technique/safety). pt needs inpatient PT tx to improve mobility deficits and progress mobility training as appropriate.        Rehab Resource Intensity Level, PT: II (Moderate Resource Intensity)    See flowsheet documentation for full assessment.

## 2024-08-11 NOTE — CASE MANAGEMENT
Case Management Assessment & Discharge Planning Note    Patient name Oliva Dc  Location W /W -01 MRN [id number]  : 3/8/1924 Date 2024       Current Admission Date: 2024  Current Admission Diagnosis:Closed fracture of ninth thoracic vertebra (HCC)   Patient Active Problem List    Diagnosis Date Noted Date Diagnosed    Closed fracture of ninth thoracic vertebra (HCC) 2024       LOS (days): 0  Geometric Mean LOS (GMLOS) (days):   Days to GMLOS:     OBJECTIVE:    Risk of Unplanned Readmission Score: 10.44         Current admission status: Inpatient       Preferred Pharmacy:   UNKNOWN - FOLLOW UP PRIOR TO DISCHARGE TO E-PRESCRIBE  No address on file      Primary Care Provider: No primary care provider on file.    Primary Insurance: [employer]  Secondary Insurance:     ASSESSMENT:  Active Health Care Proxies    There are no active Health Care Proxies on file.                      Patient Information  Admitted from:: Home  Mental Status: Alert  During Assessment patient was accompanied by: Not accompanied during assessment  Assessment information provided by:: Other - please comment (Grand daughter)  Primary Caregiver: Self  Support Systems: Children, Family members  Home entry access options. Select all that apply.: Stairs  Number of steps to enter home.: 1  Do the steps have railings?: Yes  Living Arrangements: Lives w/ Daughter    Activities of Daily Living Prior to Admission  Functional Status: Independent  Completes ADLs independently?: Yes  Ambulates independently?: Yes  Does patient use assisted devices?: No  Does patient currently own DME?: Yes  What DME does the patient currently own?: Straight [employer]ane  Does patient have a history of Outpatient Therapy (PT/OT)?: No  Does the patient have a history of Short-Term Rehab?: No  Does patient have a history of HHC?: No  Does patient currently have HHC?: No         Patient Information Continued  Income Source:  Pension/[employer]  Does patient have prescription coverage?: Yes  Does patient receive dialysis treatments?: No  Does patient have a history of substance abuse?: No  Does patient have a history of Mental Health Diagnosis?: No         Means of Transportation  Means of Transport to Appts:: Family transport      Social Determinants of Health (SDOH)      Flowsheet Row Most Recent Value   Housing Stability    In the last 12 months, was there a time when you were not able to pay the mortgage or rent on time? N   At any time in the past 12 months, were you homeless or living in a shelter (including now)? N   Transportation Needs    In the past 12 months, has lack of transportation kept you from medical appointments or from getting medications? no   In the past 12 months, has lack of transportation kept you from meetings, work, or from getting things needed for daily living? No   Food Insecurity    Within the past 12 months, you worried that your food would run out before you got the money to buy more. Never true   Within the past 12 months, the food you bought just didn't last and you didn't have money to get more. Never true   Utilities    In the past 12 months has the electric, gas, oil, or water company threatened to shut off services in your home? No            DISCHARGE DETAILS:    Discharge planning discussed with:: Patients grand daughter  Freedom of Choice: Yes  Comments - Freedom of Choice: Cm spoke with patients grand daughter to review Cm role. Grand daughter interested in HHC for patient for PT/OT. She is in agreement with blanket referal in [place]. Grand daughter also interested in [facility] for patient. CM to place order into [facility].  CM contacted family/caregiver?: Yes  Were Treatment Team discharge recommendations reviewed with patient/caregiver?: Yes  Did patient/caregiver verbalize understanding of patient care needs?: Yes  Were patient/caregiver advised of the risks associated with not following Treatment  Team discharge recommendations?: Yes    Contacts  Patient Contacts: [place]- Grand daughter  Relationship to Patient:: Family  Contact Method: Phone  Phone Number: See face sheet  Reason/Outcome: Discharge Planning    Requested Home Health Care         Is the patient interested in HHC at discharge?: Yes  Home Health Discipline requested:: Physical Therapy, Occupational Therapy         Other Referral/Resources/Interventions Provided:  Interventions: HHC, DME  Referral Comments: [place] referral in [place] for HHC- Need choice. [facility] ordered in [place]- needs f/u

## 2024-08-11 NOTE — ED NOTES
Attempt to straight cath pt. No urine output noted. Bladder scan 62cc. Allowed pt to try and drain bladder. Unable at this time. Straight cath taken out. Provider aware & was told to start abx at this time prior to urine sample.     Tisha Au RN  08/11/24 6701

## 2024-08-12 ENCOUNTER — HOME HEALTH ADMISSION (OUTPATIENT)
Dept: HOME HEALTH SERVICES | Facility: HOME HEALTHCARE | Age: 89
End: 2024-08-12
Payer: COMMERCIAL

## 2024-08-12 PROBLEM — Z91.89 AT RISK FOR DELIRIUM: Status: ACTIVE | Noted: 2024-08-12

## 2024-08-12 PROBLEM — R13.10 DYSPHAGIA: Status: ACTIVE | Noted: 2024-08-12

## 2024-08-12 PROBLEM — R26.2 AMBULATORY DYSFUNCTION: Status: ACTIVE | Noted: 2024-08-12

## 2024-08-12 PROBLEM — F03.90 DEMENTIA WITHOUT BEHAVIORAL DISTURBANCE (HCC): Status: ACTIVE | Noted: 2024-08-12

## 2024-08-12 PROBLEM — I10 HYPERTENSION: Status: ACTIVE | Noted: 2024-08-12

## 2024-08-12 PROBLEM — K44.9 HIATAL HERNIA: Status: ACTIVE | Noted: 2024-08-12

## 2024-08-12 PROBLEM — N17.9 ACUTE KIDNEY INJURY (HCC): Status: ACTIVE | Noted: 2024-08-12

## 2024-08-12 PROBLEM — N39.0 ACUTE UTI (URINARY TRACT INFECTION): Status: ACTIVE | Noted: 2024-08-12

## 2024-08-12 PROBLEM — R65.10 SIRS (SYSTEMIC INFLAMMATORY RESPONSE SYNDROME) (HCC): Status: ACTIVE | Noted: 2024-08-12

## 2024-08-12 PROBLEM — K52.9 CHRONIC DIARRHEA: Status: ACTIVE | Noted: 2024-08-12

## 2024-08-12 PROBLEM — R29.6 MULTIPLE FALLS: Status: ACTIVE | Noted: 2024-08-12

## 2024-08-12 LAB
25(OH)D3 SERPL-MCNC: 20.1 NG/ML (ref 30–100)
ANION GAP SERPL CALCULATED.3IONS-SCNC: 7 MMOL/L (ref 4–13)
BACTERIA UR CULT: NORMAL
BASOPHILS # BLD AUTO: 0.05 THOUSANDS/ÂΜL (ref 0–0.1)
BASOPHILS NFR BLD AUTO: 1 % (ref 0–1)
BUN SERPL-MCNC: 26 MG/DL (ref 5–25)
CALCIUM SERPL-MCNC: 8.4 MG/DL (ref 8.4–10.2)
CHLORIDE SERPL-SCNC: 108 MMOL/L (ref 96–108)
CO2 SERPL-SCNC: 25 MMOL/L (ref 21–32)
CREAT SERPL-MCNC: 0.96 MG/DL (ref 0.6–1.3)
EOSINOPHIL # BLD AUTO: 0.08 THOUSAND/ÂΜL (ref 0–0.61)
EOSINOPHIL NFR BLD AUTO: 1 % (ref 0–6)
ERYTHROCYTE [DISTWIDTH] IN BLOOD BY AUTOMATED COUNT: 13.7 % (ref 11.6–15.1)
GFR SERPL CREATININE-BSD FRML MDRD: 48 ML/MIN/1.73SQ M
GLUCOSE SERPL-MCNC: 117 MG/DL (ref 65–140)
HCT VFR BLD AUTO: 34.9 % (ref 34.8–46.1)
HGB BLD-MCNC: 11.2 G/DL (ref 11.5–15.4)
IMM GRANULOCYTES # BLD AUTO: 0.03 THOUSAND/UL (ref 0–0.2)
IMM GRANULOCYTES NFR BLD AUTO: 0 % (ref 0–2)
LYMPHOCYTES # BLD AUTO: 1.28 THOUSANDS/ÂΜL (ref 0.6–4.47)
LYMPHOCYTES NFR BLD AUTO: 12 % (ref 14–44)
MAGNESIUM SERPL-MCNC: 1.9 MG/DL (ref 1.9–2.7)
MCH RBC QN AUTO: 32.1 PG (ref 26.8–34.3)
MCHC RBC AUTO-ENTMCNC: 32.1 G/DL (ref 31.4–37.4)
MCV RBC AUTO: 100 FL (ref 82–98)
MONOCYTES # BLD AUTO: 0.79 THOUSAND/ÂΜL (ref 0.17–1.22)
MONOCYTES NFR BLD AUTO: 7 % (ref 4–12)
NEUTROPHILS # BLD AUTO: 8.82 THOUSANDS/ÂΜL (ref 1.85–7.62)
NEUTS SEG NFR BLD AUTO: 79 % (ref 43–75)
NRBC BLD AUTO-RTO: 0 /100 WBCS
PHOSPHATE SERPL-MCNC: 2.9 MG/DL (ref 2.3–4.1)
PLATELET # BLD AUTO: 251 THOUSANDS/UL (ref 149–390)
PMV BLD AUTO: 10.3 FL (ref 8.9–12.7)
POTASSIUM SERPL-SCNC: 3.9 MMOL/L (ref 3.5–5.3)
RBC # BLD AUTO: 3.49 MILLION/UL (ref 3.81–5.12)
SODIUM SERPL-SCNC: 140 MMOL/L (ref 135–147)
VIT B12 SERPL-MCNC: 251 PG/ML (ref 180–914)
WBC # BLD AUTO: 11.05 THOUSAND/UL (ref 4.31–10.16)

## 2024-08-12 PROCEDURE — 83735 ASSAY OF MAGNESIUM: CPT | Performed by: PHYSICIAN ASSISTANT

## 2024-08-12 PROCEDURE — 97110 THERAPEUTIC EXERCISES: CPT

## 2024-08-12 PROCEDURE — 97116 GAIT TRAINING THERAPY: CPT

## 2024-08-12 PROCEDURE — 97167 OT EVAL HIGH COMPLEX 60 MIN: CPT

## 2024-08-12 PROCEDURE — 82607 VITAMIN B-12: CPT | Performed by: FAMILY MEDICINE

## 2024-08-12 PROCEDURE — 84100 ASSAY OF PHOSPHORUS: CPT | Performed by: PHYSICIAN ASSISTANT

## 2024-08-12 PROCEDURE — 85025 COMPLETE CBC W/AUTO DIFF WBC: CPT | Performed by: PHYSICIAN ASSISTANT

## 2024-08-12 PROCEDURE — 92610 EVALUATE SWALLOWING FUNCTION: CPT

## 2024-08-12 PROCEDURE — 99232 SBSQ HOSP IP/OBS MODERATE 35: CPT | Performed by: SURGERY

## 2024-08-12 PROCEDURE — 99223 1ST HOSP IP/OBS HIGH 75: CPT | Performed by: PHYSICIAN ASSISTANT

## 2024-08-12 PROCEDURE — 99223 1ST HOSP IP/OBS HIGH 75: CPT | Performed by: FAMILY MEDICINE

## 2024-08-12 PROCEDURE — 80048 BASIC METABOLIC PNL TOTAL CA: CPT | Performed by: PHYSICIAN ASSISTANT

## 2024-08-12 PROCEDURE — 82306 VITAMIN D 25 HYDROXY: CPT | Performed by: FAMILY MEDICINE

## 2024-08-12 RX ORDER — AMLODIPINE BESYLATE 2.5 MG/1
2.5 TABLET ORAL DAILY
Status: DISCONTINUED | OUTPATIENT
Start: 2024-08-12 | End: 2024-08-15 | Stop reason: HOSPADM

## 2024-08-12 RX ORDER — PANTOPRAZOLE SODIUM 40 MG/1
40 TABLET, DELAYED RELEASE ORAL
Status: DISCONTINUED | OUTPATIENT
Start: 2024-08-12 | End: 2024-08-15 | Stop reason: HOSPADM

## 2024-08-12 RX ORDER — DONEPEZIL HYDROCHLORIDE 5 MG/1
10 TABLET, FILM COATED ORAL
Status: DISCONTINUED | OUTPATIENT
Start: 2024-08-12 | End: 2024-08-15 | Stop reason: HOSPADM

## 2024-08-12 RX ORDER — MIRTAZAPINE 15 MG/1
15 TABLET, FILM COATED ORAL
Status: DISCONTINUED | OUTPATIENT
Start: 2024-08-12 | End: 2024-08-15 | Stop reason: HOSPADM

## 2024-08-12 RX ADMIN — NYSTATIN: 100000 POWDER TOPICAL at 08:59

## 2024-08-12 RX ADMIN — CEFTRIAXONE SODIUM 1000 MG: 10 INJECTION, POWDER, FOR SOLUTION INTRAVENOUS at 09:37

## 2024-08-12 RX ADMIN — HEPARIN SODIUM 5000 UNITS: 5000 INJECTION INTRAVENOUS; SUBCUTANEOUS at 04:35

## 2024-08-12 RX ADMIN — MIRTAZAPINE 15 MG: 15 TABLET, FILM COATED ORAL at 21:09

## 2024-08-12 RX ADMIN — ACETAMINOPHEN 975 MG: 325 TABLET, FILM COATED ORAL at 21:09

## 2024-08-12 RX ADMIN — ACETAMINOPHEN 975 MG: 325 TABLET, FILM COATED ORAL at 04:34

## 2024-08-12 RX ADMIN — DONEPEZIL HYDROCHLORIDE 10 MG: 5 TABLET ORAL at 21:09

## 2024-08-12 RX ADMIN — HEPARIN SODIUM 5000 UNITS: 5000 INJECTION INTRAVENOUS; SUBCUTANEOUS at 21:10

## 2024-08-12 RX ADMIN — SENNOSIDES AND DOCUSATE SODIUM 1 TABLET: 50; 8.6 TABLET ORAL at 21:09

## 2024-08-12 RX ADMIN — NYSTATIN: 100000 POWDER TOPICAL at 17:21

## 2024-08-12 RX ADMIN — POLYETHYLENE GLYCOL 3350 17 G: 17 POWDER, FOR SOLUTION ORAL at 08:51

## 2024-08-12 RX ADMIN — PANTOPRAZOLE SODIUM 40 MG: 40 TABLET, DELAYED RELEASE ORAL at 08:48

## 2024-08-12 RX ADMIN — AMLODIPINE BESYLATE 2.5 MG: 2.5 TABLET ORAL at 08:47

## 2024-08-12 RX ADMIN — LIDOCAINE 1 PATCH: 700 PATCH TOPICAL at 08:51

## 2024-08-12 RX ADMIN — HEPARIN SODIUM 5000 UNITS: 5000 INJECTION INTRAVENOUS; SUBCUTANEOUS at 13:43

## 2024-08-12 RX ADMIN — SERTRALINE HYDROCHLORIDE 50 MG: 50 TABLET ORAL at 08:47

## 2024-08-12 RX ADMIN — GABAPENTIN 100 MG: 100 CAPSULE ORAL at 21:09

## 2024-08-12 NOTE — CONSULTS
Consultation - Geriatric Medicine   Oliva MARYJANE Dc 89 y.o. female MRN: [id number]  Unit/Bed#: W -01 Encounter: [id number]      Assessment & Plan     Dysphagia  Assessment & Plan  Patient complains of difficulty swallowing  Reports dry cough every time she eats  Noted to have hypersalivation during the encounter  Speech therapy follows appreciate input    Hiatal hernia  Assessment & Plan  CT chest abdomen pelvis on 8/11/2024 shows small hiatal hernia  Patient complains of loss of appetite, dysphagia, and cough with increased mucus production  She states that she was taking Robitussin 100 mg/5 ml, 200 mg TID for her cough, but is no longer taking this  Patient currently taking pantoprazole 40 mg daily inpatient  Continue current regimen per primary team   Speech pathology consulted, appreciate input    At risk for delirium  Assessment & Plan  -Patient is high risk of delirium due to pain, change in environment, urinary tract infection  -Initiate delirium precautions  -maintain normal sleep/wake cycle  -minimize overnight interruptions, group overnight vitals/labs/nursing checks as possible  -dim lights, close blinds and turn off tv to minimize stimulation and encourage sleep environment in evenings  -ensure that pain is well controlled   -monitor for fecal and urinary retention which may precipitate delirium  -encourage early mobilization and ambulation  -provide frequent reorientation and redirection  -encourage family and friends at the bedside to help calm patient if anxious  -avoid medications which may precipitate or worsen delirium such as tramadol, benzodiazepine, anticholinergics, and antihistaminics  -encourage hydration and nutrition , assist with feeding if needed  -redirect unwanted behaviors as first line, avoid physical restraints.     Chronic diarrhea  Assessment & Plan  Patient with history of chronic diarrhea.   Patient takes loperamide 2 mg daily at home.   Continue current regimen.   Monitor  for dehydration.     Hypertension  Assessment & Plan  /80 today.  Patient takes amlodipine 2.5 mg daily and furosemide 20 mg daily at home.  Continue to monitor vitals daily.  Avoid hypotension  Advised about low salt diet and exercise.     Dementia without behavioral disturbance (HCC)  Assessment & Plan  Pt. Is AAOx3 at baseline   Currently AAOx3  Patient scored a 2/5 on mini cog  Pt. Lives at home with daughter   Mostly dependent for instrumental ADLs; patient reports that she cleans the house, vacuums, makes her bed, and takes out the garbage.  Mostly independent for ADLs; requires minimal/occasional assistance with bathing and dressing  Normal behaviors  Was taking donepezil 10 mg daily prior to admission  Consider discontinuing donepezil as it is contraindicated in patients <50 kg; however, patient feels as though it has really helped with her memory. Monitor closely.   TSH 3.59 on 6/24/2024  B12 pending  CT shows stable diminished attenuation in the periventricular and subcortical matter likely due to moderate chronic microangiopathy.  [place] to person, place, time, and situation as needed  Monitor for behaviors   Monitor for mental status change  Provide supportive care     Ambulatory dysfunction  Assessment & Plan  Patient uses a cane at baseline.   History of falls.   Will continue PT/OT.  Placed on fall precautions.      Acute UTI (urinary tract infection)  Assessment & Plan  Abnormal UA present on admission   Patient started on Rocephin 1000 mg x 3 doses   Monitor for signs of urinary retention   Continue current regimen per primary team    Multiple falls  Assessment & Plan  Patient has a history of falls  Most recent fall was one month prior to this event   Per granddaughter, patient has had increased dizziness/light-headedness upon standing, leading to falls   Monitor orthostatic vitals  Fall precautions      * Closed fracture of ninth thoracic vertebra (HCC)  Assessment & Plan  Patient presented  to the ED on 8/11 after a fall that occurred on 8/7  Patient complained of increasing thoracic back pain   CT abdomen pelvis demonstrates mild superior T9 compression deformity and severe chronic T7 compression deformity   Neurosurgery follows, appreciate input  Pain is currently controlled. Continue tylenol ATC, gabapentin 100 mg QHS  DVT prophylaxis with heparin, SCDs bilateral legs  PT/OT as indicated   TLSO brace                  History of Present Illness   Physician Requesting Consult: Neris Pruett MD  Reason for Consult / Principal Problem: Dementia and fall   Additional history obtained from: Granddaughter      HPI: Oliva Dc is a very pleasant 89 y.o. year old female who presents with a PMH of dementia, hypertension, osteoarthritis, and chronic diarrhea. Patient presented to the ED on 8/11/2024 after a fall that occurred on 8/7/2024. Patient attempted to get out of bed when she became dizzy and fell onto her left side onto a night stand. CT abdomen pelvis demonstrated mild superior T9 compression deformity and severe chronic T7 compression deformity.     Patient is AAOx3 at time of encounter. She denies pain. She reports that she slept very well last night. She reports a loss of appetite which has become increasingly worse. She reports some dysphagia and cough due to hiatal hernia. She was taking Robitussin 100 mg/5 ml, 200 mg TID for her cough, but reports that she is no longer taking it. She also reports dizziness and light-headedness upon standing, which caused her most recent fall. She states that she has not had a bowel movement since hospital admission. She denies abdominal pain. She denies dysuria, urgency, and frequency.     Per granddaughter, patient is AAOx3 at baseline. Patient lives with her daughter who was a nurse. Patient just recently started using a cane to ambulate at baseline because she has had increasing weakness. Patient has a history of falls, most recent being one  month prior to this event. Granddaughter notes that she becomes dizzy and light-headed upon standing which is when the falls usually occur. Per granddaughter, patient's daughter adjusts her blood pressure medications. Patient does not drive. Patient is mostly independent with ADLs. She requires minimal assistance with dressing and bathing. She is moderately dependent with IADLs. Patient reports that she cleans the house, vacuums, makes her bed, and takes out the trash occasionally. Her daughter prepares her meals, manages her medications, does some  housework. Her granddaughter manages her finances. Per granddaughter, patient has not had any recent changes in behavior such as confusion, aggression, or agitation.     Inpatient consult to Gerontology  Consult performed by: Nohelia Rubio MD  Consult ordered by: Dino Slef PA-C          Review of Systems   Constitutional:  Positive for appetite change. Negative for chills and fever.   HENT:  Positive for hearing loss. Negative for congestion and rhinorrhea.    Respiratory:  Positive for cough. Negative for shortness of breath and wheezing.    Cardiovascular:  Negative for chest pain, palpitations and leg swelling.   Gastrointestinal:  Positive for constipation. Negative for abdominal pain.   Endocrine: Negative for cold intolerance.   Genitourinary:  Negative for difficulty urinating, dysuria and hematuria.   Musculoskeletal:  Positive for gait problem.   Skin:  Negative for wound.   Allergic/Immunologic: Negative for environmental allergies.   Neurological:  Positive for dizziness. Negative for seizures.   Hematological:  Does not bruise/bleed easily.   Psychiatric/Behavioral:  Negative for behavioral problems and sleep disturbance.            Historical Information   History reviewed. No pertinent past medical history.  History reviewed. No pertinent surgical history.  Social History   Social History     Substance and Sexual Activity   Alcohol Use None     Social  History     Substance and Sexual Activity   Drug Use Not on file     Social History     Tobacco Use   Smoking Status Not on file   Smokeless Tobacco Not on file     Family History: History reviewed. No pertinent family history.    Meds/Allergies   current meds:   Current Facility-Administered Medications   Medication Dose Route Frequency    acetaminophen (TYLENOL) tablet 975 mg  975 mg Oral Q8H ROBERTO    amLODIPine (NORVASC) tablet 2.5 mg  2.5 mg Oral Daily    ceftriaxone (ROCEPHIN) 1 g/50 mL in dextrose IVPB  1,000 mg Intravenous Q24H    donepezil (ARICEPT) tablet 10 mg  10 mg Oral HS    gabapentin (NEURONTIN) capsule 100 mg  100 mg Oral HS    heparin (porcine) subcutaneous injection 5,000 Units  5,000 Units Subcutaneous Q8H ROBERTO    HYDROmorphone HCl (DILAUDID) injection 0.2 mg  0.2 mg Intravenous Q2H PRN    lidocaine (LIDODERM) 5 % patch 1 patch  1 patch Topical Daily    mirtazapine (REMERON) tablet 15 mg  15 mg Oral HS    naloxone (NARCAN) 0.04 mg/mL syringe 0.04 mg  0.04 mg Intravenous Q1MIN PRN    nystatin (MYCOSTATIN) powder   Topical BID    ondansetron (ZOFRAN) injection 4 mg  4 mg Intravenous Q6H PRN    oxyCODONE (ROXICODONE) split tablet 2.5 mg  2.5 mg Oral Q4H PRN    Or    oxyCODONE (ROXICODONE) IR tablet 5 mg  5 mg Oral Q4H PRN    pantoprazole (PROTONIX) EC tablet 40 mg  40 mg Oral Early Morning    polyethylene glycol (MIRALAX) packet 17 g  17 g Oral Daily    senna-docusate sodium (SENOKOT S) 8.6-50 mg per tablet 1 tablet  1 tablet Oral HS    sertraline (ZOLOFT) tablet 50 mg  50 mg Oral Daily     Home medications: confirmed by [facility]  Acetaminophen 500 mg prn  Furosemide 20 mg daily  Loperamide 2 mg daily   Amlodipine 2.5 mg daily  Donepezil 10 mg daily  Mirtazapine 15 mg daily  Sertraline 50 mg daily     Allergies   Allergen Reactions    Penicillins     Sulfa Antibiotics Other (See Comments)       Objective     Intake/Output Summary (Last 24 hours) at 8/12/2024 1148  Last data filed at 8/12/2024  0856  Gross per 24 hour   Intake 1144.17 ml   Output 200 ml   Net 944.17 ml     Invasive Devices       Peripheral Intravenous Line  Duration             Peripheral IV 08/11/24 Left;Ventral (anterior) Forearm 1 day    Peripheral IV 08/11/24 Right;Ventral (anterior) Forearm 1 day                    Physical Exam  Vitals and nursing note reviewed.   Constitutional:       General: She is not in acute distress.     Appearance: She is well-developed.      Comments: Frail looking   HENT:      Head: Normocephalic and atraumatic.      Right Ear: External ear normal.      Left Ear: External ear normal.      Ears:      Comments: [place]     Mouth/Throat:      Mouth: Mucous membranes are moist.   Eyes:      Conjunctiva/sclera: Conjunctivae normal.   Cardiovascular:      Rate and Rhythm: Normal rate and regular rhythm.      Heart sounds: No murmur heard.  Pulmonary:      Effort: Pulmonary effort is normal. No respiratory distress.      Breath sounds: Normal breath sounds.   Abdominal:      Palpations: Abdomen is soft.      Tenderness: There is no abdominal tenderness.   Musculoskeletal:         General: No swelling.      Cervical back: Neck supple.      Right lower leg: No edema.      Left lower leg: No edema.   Skin:     General: Skin is warm and dry.      Capillary Refill: Capillary refill takes less than 2 seconds.   Neurological:      Mental Status: She is alert and oriented to person, place, and time.      Gait: Gait abnormal.   Psychiatric:         Mood and Affect: Mood normal.         Lab Results:   I have personally reviewed pertinent lab results including the following:  - CBC, BMP, UA    I have personally reviewed the following imaging study reports in PACS:  - XR spine, XR tibia fibula, CT chest abdomen pelvis, CT head       Therapies:   PT: Evaluation pending  OT: Evaluation pending    VTE Prophylaxis: Heparin    Code Status: Level 3 - DNAR and DNI  Advance Directive and Living Will:      Power of :    POLST:       Family and Social Support: Lives with daughter, granddaughter is very supportive.   Living Arrangements: Lives w/ Daughter  Support Systems: Children; Family members  Assistance Needed: none  Type of Current Residence: Private residence  Current Home Care Services: No  Discharge planning discussed with:: Patients grand daughter  Freedom of Choice: Yes        Thank you for allowing me to participate in your patients' care. Please do not hesitate to call with any additional questions.  Nohelia Rubio MD

## 2024-08-12 NOTE — PROGRESS NOTES
"[facility]  Progress Note  Name: Oliva Dc I  MRN: [id number]  Unit/Bed#: W -01 I Date of Admission: 8/11/2024   Date of Service: 8/12/2024 I Hospital Day: 1    Assessment & Plan   Acute UTI (urinary tract infection)  Assessment & Plan  - UA present on admission  - Continue Rocephin x 3 doses    Multiple falls  Assessment & Plan  - Status post multiple falls with the below noted injuries.  - Fall precautions.  - Geriatric Medicine consultation for evaluation, medication review and recommendations.  - PT and OT evaluation and treatment as indicated.  - Case Management consultation for disposition planning.      * Closed fracture of ninth thoracic vertebra (HCC)  Assessment & Plan  - T9 fracture  - Appreciate neurosurgery consult and recommendations  - TLSO brace and uprights  - Neurovascularly intact  - Pain control  - PT and OT             TRAUMA TERTIARY SURVEY NOTE    VTE Prophylaxis:Heparin SQ    Disposition: Continue MS level of care, disposition pending PT and OT    Code status:  Level 3 - DNAR and DNI    Consultants: IP CONSULT TO CASE MANAGEMENT  IP CONSULT TO NEUROSURGERY  IP CONSULT TO GERONTOLOGY    Subjective   Transfer from: none    Mechanism of Injury:Fall     Chief Complaint: \"I have to pee\"    HPI/Last 24 hour events: Patient reports she needs to use the restroom. She also admits that she has been having difficulty swallowing breakfast this morning.      Objective   Vitals:   Temp:  [97.5 °F (36.4 °C)-98.3 °F (36.8 °C)] 97.9 °F (36.6 °C)  HR:  [] 73  Resp:  [14-24] 20  BP: (131-219)/(60-94) 174/80    I/O         08/10 0701  08/11 0700 08/11 0701  08/12 0700 08/12 0701  08/13 0700    P.O.  120     IV Piggyback  550     Total Intake(mL/kg)  670 (14.3)     Urine (mL/kg/hr)  200     Total Output  200     Net  +470                     Physical Exam:   GENERAL APPEARANCE: Patient in no acute distress.  HEENT: NCAT; PERRL, EOMs intact; Mucous membranes moist  CV: " Regular rate and rhythm; no murmur/gallops/rubs appreciated.  CHEST / LUNGS: Clear to auscultation; no wheezes/rales/rhonci.  ABD: NABS; soft; non-distended; non-tender.  : Voiding  EXT: +2 pulses bilaterally upper & lower extremities; no edema.  NEURO: GCS 14/15; no focal neurologic deficits; neurovascularly intact.  SKIN: Warm, dry and well perfused; no rash; no jaundice.      Invasive Devices       Peripheral Intravenous Line  Duration             Peripheral IV 08/11/24 Left;Ventral (anterior) Forearm <1 day    Peripheral IV 08/11/24 Right;Ventral (anterior) Forearm <1 day                         Lab Results: Results: I have personally reviewed all pertinent laboratory/tests results    Imaging Results: I have personally reviewed pertinent reports.    Chest Xray(s): negative for acute findings   FAST exam(s): N/A   CT Scan(s): positive for acute findings: T6 fracture   Additional Xray(s): pending     Other Studies: n/a

## 2024-08-12 NOTE — ASSESSMENT & PLAN NOTE
/60 today.  Patient takes amlodipine 2.5 mg daily and furosemide 20 mg daily at home.  Continue to monitor vitals daily.  Avoid hypotension  Advised about low salt diet and exercise.

## 2024-08-12 NOTE — ASSESSMENT & PLAN NOTE
Abnormal UA present on admission   Patient started on Rocephin 1000 mg x 3 doses   Monitor for signs of urinary retention   Continue current regimen per primary team

## 2024-08-12 NOTE — ASSESSMENT & PLAN NOTE
- Status post multiple falls with the below noted injuries.  - Fall precautions.  - Geriatric Medicine consultation for evaluation, medication review and recommendations.  - PT and OT evaluation and treatment as indicated.  - Case Management consultation for disposition planning.

## 2024-08-12 NOTE — PLAN OF CARE
Problem: OCCUPATIONAL THERAPY ADULT  Goal: Performs self-care activities at highest level of function for planned discharge setting.  See evaluation for individualized goals.  Description: Treatment Interventions: ADL retraining, Functional transfer training, Endurance training, Patient/family training, Compensatory technique education, Continued evaluation, Energy conservation, Activityengagement          See flowsheet documentation for full assessment, interventions and recommendations.   Note: Limitation: Decreased ADL status, Decreased Safe judgement during ADL, Decreased endurance, Decreased cognition, Decreased self-care trans, Decreased high-level ADLs  Prognosis: Good  Assessment: Pt is a 89 y.o. female seen for OT evaluation s/p admission to [facility] on 8/11/2024 due to fall going to the bathroom. Diagnosed with Closed fracture of ninth thoracic vertebra (HCC). Personal and env factors supporting pt at time of IE include (I) PLOF, supportive daughter, and accessible home environment. Personal and env factors inhibiting engagement in occupations include advanced age and difficulty completing ADLs. Performance deficits that affect the pt’s occupational performance can be seen above. Due to pt's current functional limitations and medical complications pt is functioning below baseline. Pt would benefit from continued skilled OT treatment in order to maximize safety, independence and overall performance with ADLs, functional mobility, and functional transfers in order to achieve highest level of function.     Rehab Resource Intensity Level, OT: II (Moderate Resource Intensity) (level III pending support at home on DC and progress)

## 2024-08-12 NOTE — CASE MANAGEMENT
Case Management Progress Note    Patient name Oliva Dc  Location W /W -01 MRN [id number]  : 3/8/1924 Date 2024       LOS (days): 1  Geometric Mean LOS (GMLOS) (days): 2.9  Days to GMLOS:1.8        OBJECTIVE:        Current admission status: Inpatient  Preferred Pharmacy:   UNKNOWN - FOLLOW UP PRIOR TO DISCHARGE TO E-PRESCRIBE  No address on file      Primary Care Provider: No primary care provider on file.    Primary Insurance: BLUE CROSS  REP  Secondary Insurance:     PROGRESS NOTE: Patient choice list for C shared with granddaughter Eulalia. HHC reserved with [facility]

## 2024-08-12 NOTE — ASSESSMENT & PLAN NOTE
-Patient is high risk of delirium due to pain, change in environment, urinary tract infection  -Initiate delirium precautions  -maintain normal sleep/wake cycle  -minimize overnight interruptions, group overnight vitals/labs/nursing checks as possible  -dim lights, close blinds and turn off tv to minimize stimulation and encourage sleep environment in evenings  -ensure that pain is well controlled   -monitor for fecal and urinary retention which may precipitate delirium  -encourage early mobilization and ambulation  -provide frequent reorientation and redirection  -encourage family and friends at the bedside to help calm patient if anxious  -avoid medications which may precipitate or worsen delirium such as tramadol, benzodiazepine, anticholinergics, and antihistaminics  -encourage hydration and nutrition , assist with feeding if needed  -redirect unwanted behaviors as first line, avoid physical restraints.

## 2024-08-12 NOTE — ASSESSMENT & PLAN NOTE
- T9 fracture  - Appreciate neurosurgery consult and recommendations  - TLSO brace and uprights  - Neurovascularly intact  - Pain control  - PT and OT

## 2024-08-12 NOTE — OCCUPATIONAL THERAPY NOTE
Occupational Therapy Evaluation     Patient Name: Oliva Dc  Today's Date: 8/12/2024  Problem List  Principal Problem:    Closed fracture of ninth thoracic vertebra (HCC)  Active Problems:    Multiple falls    Acute UTI (urinary tract infection)    Acute kidney injury (HCC)    Ambulatory dysfunction    Dementia without behavioral disturbance (HCC)    Hypertension    Chronic diarrhea    At risk for delirium    Hiatal hernia    Dysphagia    SIRS (systemic inflammatory response syndrome) (HCC)    Past Medical History  History reviewed. No pertinent past medical history.  Past Surgical History  History reviewed. No pertinent surgical history.        08/12/24 1423   OT Last Visit   OT Visit Date 08/12/24   Note Type   Note type Evaluation   Pain Assessment   Pain Assessment Tool 0-10   Pain Score No Pain   Home Living   Type of Home House  (w/ daughter)   Home Layout One level;Performs ADLs on one level;Stairs to enter without rails  (1 ANA)   Bathroom Shower/Tub Tub/shower unit   Bathroom Toilet Standard   Bathroom Equipment Tub transfer bench   Bathroom Accessibility Accessible   Home Equipment Cane   Additional Comments lives w/ daughter   Prior Function   Level of [place] Independent with ADLs;Needs assistance with IADLS;Independent with functional mobility   Lives With Daughter   Receives Help From Family   IADLs Family/Friend/Other provides transportation;Family/Friend/Other provides medication management;Family/Friend/Other provides meals  (daughter does the cooking, and pt does light cleaning)   Falls in the last 6 months 1 to 4  (at least 2 falls pt reports)   Vocational Retired   Lifestyle   Autonomy pta pt was (I) w ADLs and (A) w IADLs, able to do light cleaning around the house, (-) , at least 2 falls, lives w family   Reciprocal Relationships daughter   Service to Others retired   Intrinsic Gratification her independence   General   Additional Pertinent History Dementia, ambulatory  dysfunction, SIRS, TAMEKA and UTI, hypertension   Family/Caregiver Present No   Additional General Comments Pt was pleasant and cooperative throughout session   Subjective   Subjective I think I am doing much better now   ADL   Where Assessed Chair   Eating Assistance 5  Supervision/Setup   Grooming Assistance 5  Supervision/Setup   UB Bathing Assistance 5  Supervision/Setup   LB Bathing Assistance 4  Minimal Assistance   UB Dressing Assistance 3  Moderate Assistance  (TLSO don and doff)   UB Dressing Deficit Thread RUE;Thread LUE;Pull around back   LB Dressing Assistance 5  Supervision/Setup   LB Dressing Deficit Thread RLE into underwear;Pull up over hips;Thread LLE into underwear;Requires assistive device for steadying;Increased time to complete   Toileting Assistance  4  Minimal Assistance   Bed Mobility   Additional Comments Pt greeted OOB in recliner chair w/ all needs met   Transfers   Sit to Stand 4  Minimal assistance  (CGA)   Additional items Assist x 1;Armrests;Increased time required   Stand to Sit 4  Minimal assistance  (CGA)   Additional items Assist x 1;Increased time required;Armrests   Additional Comments pt declined use of toilet   Functional Mobility   Functional Mobility 4  Minimal assistance  (CGA)   Additional Comments min A x1, use of RW   Additional items Rolling walker   Balance   Static Sitting Fair +   Dynamic Sitting Fair   Static Standing Fair   Dynamic Standing Fair -   Ambulatory Poor +   Activity Tolerance   Activity Tolerance Patient tolerated treatment well   RUE Assessment   RUE Assessment WFL   LUE Assessment   LUE Assessment WFL   Cognition   Overall Cognitive Status Impaired  (hx of dementia)   Arousal/Participation Alert;Responsive   Attention Attends with cues to redirect   Orientation Level Oriented to person;Oriented to situation;Disoriented to place;Disoriented to time   Memory Decreased short term memory   Following Commands Follows one step commands with increased time or  repetition   Assessment   Limitation Decreased ADL status;Decreased Safe judgement during ADL;Decreased endurance;Decreased cognition;Decreased self-care trans;Decreased high-level ADLs   Prognosis Good   Assessment Pt is a 89 y.o. female seen for OT evaluation s/p admission to [facility] on 8/11/2024 due to fall going to the bathroom. Diagnosed with Closed fracture of ninth thoracic vertebra (HCC). Personal and env factors supporting pt at time of IE include (I) PLOF, supportive daughter, and accessible home environment. Personal and env factors inhibiting engagement in occupations include advanced age and difficulty completing ADLs. Performance deficits that affect the pt’s occupational performance can be seen above. Due to pt's current functional limitations and medical complications pt is functioning below baseline. Pt would benefit from continued skilled OT treatment in order to maximize safety, independence and overall performance with ADLs, functional mobility, and functional transfers in order to achieve highest level of function.   Goals   Patient Goals to go home   LTG Time Frame 7-10   Long Term Goal see below   Plan   Treatment Interventions ADL retraining;Functional transfer training;Endurance training;Patient/family training;Compensatory technique education;Continued evaluation;Energy conservation;Activityengagement   Goal Expiration Date 08/22/24   OT Treatment Day 0   OT Frequency 2-3x/wk   Discharge Recommendation   Rehab Resource Intensity Level, OT II (Moderate Resource Intensity)  (level III pending support at home on DC and progress)   Additional Comments  The patient's raw score on the AM-PAC Daily Activity Inpatient Short Form is 17. A raw score of less than 19 suggests the patient may benefit from discharge to post-acute rehabilitation services. Please refer to the recommendation of the Occupational Therapist for safe discharge planning.   AM-PAC Daily Activity Inpatient   Lower Body Dressing 3    Bathing 3   Toileting 2   Upper Body Dressing 3   Grooming 3   Eating 3   Daily Activity Raw Score 17   Daily Activity Standardized Score (Calc for Raw Score >=11) 37.26   AM-PAC Applied Cognition Inpatient   Following a Speech/Presentation 2   Understanding Ordinary Conversation 3   Taking Medications 2   Remembering Where Things Are Placed or Put Away 2   Remembering List of 4-5 Errands 1   Taking Care of Complicated Tasks 1   Applied Cognition Raw Score 11   Applied Cognition Standardized Score 27.03   End of Consult   Education Provided Yes   Patient Position at End of Consult Bedside chair;Bed/Chair alarm activated;All needs within reach   Nurse Communication Nurse aware of consult     GOALS    Pt will improve activity tolerance to G for min 30 min txment sessions for increase engagement in functional tasks    Pt will complete bed mobility at a Mod I level w/ G balance/safety demonstrated to decrease caregiver assistance required     Pt will complete UB dressing/self care w/ mod I using adaptive device and DME as needed     Pt will complete LB dressing/self care w/ mod I using adaptive device and DME as needed    Pt will complete toileting w/ mod I w/ G hygiene/thoroughness using DME as needed    Pt will improve functional transfers to Mod I on/off all surfaces using DME as needed w/ G balance/safety     Pt will improve functional mobility during ADL/IADL/leisure tasks to Mod I using DME as needed w/ G balance/safety     GARY Stephen/L

## 2024-08-12 NOTE — ASSESSMENT & PLAN NOTE
Patient presented to the ED on 8/11 after a fall that occurred on 8/7  Patient complained of increasing thoracic back pain   CT abdomen pelvis demonstrates mild superior T9 compression deformity and severe chronic T7 compression deformity   Neurosurgery follows, appreciate input  Pain is currently controlled. Continue tylenol ATC, gabapentin 100 mg QHS  DVT prophylaxis with SCDs bilateral legs  Spine precautions  PT/OT as indicated   TLSO brace to be worn PRN for comfort as tolerated.   At this time, no nsx intervention is anticipated. Recommend conservative management with pain control and PT as needed.  On patient's progress with PT and family's ability to provide 24/7 assist to patient patient is now recommended for Level III Minimum Resource Intensity. If family unable to provide 24/7 care/assist patient would then be appropriate for Level II Moderate Resource Intensity.

## 2024-08-12 NOTE — PLAN OF CARE
Problem: PHYSICAL THERAPY ADULT  Goal: Performs mobility at highest level of function for planned discharge setting.  See evaluation for individualized goals.  Description: Treatment/Interventions: Functional transfer training, LE strengthening/ROM, Elevations, Therapeutic exercise, Endurance training, Gait training, Bed mobility, Equipment eval/education, Patient/family training          See flowsheet documentation for full assessment, interventions and recommendations.  Outcome: Progressing  Note:    Problem List: Decreased strength, Decreased endurance, Impaired balance, Decreased mobility, Decreased safety awareness, Pain, Orthopedic restrictions, Impaired hearing  Assessment: pt began tx session lying supine in [facility] bed as pt was agreeable to participate in PT intervention. Pt to focus on bed mobility, transfer training, TE activities, posture/balance w/ gait and increasing pt activity tolerance. In comnparison to previous tx sessions pt continues to require min Ax1 for all bed mobility, functional transfers  to and from RW with VC's for hand placement. pt is also min Ax1 for short distance ambulation as pt ambulated 10'x1 RW. Additional was not possible due to fatigue and generalized weakness. pt did participate in TE activities while seated in recliner with AROM, no increases in pain in order to strengthen LE's in efforts in reducing risk for falls and injuries with all OOB activities. Post tx pt in recliner with call bell, chair alarm activated and all pt needs met. Continue to recomemdn Dc w/ level 2 moderate rehab resource intensity when medically cleared        Rehab Resource Intensity Level, PT: II (Moderate Resource Intensity)    See flowsheet documentation for full assessment.

## 2024-08-12 NOTE — CONSULTS
Consultation - Neurosurgery   Oliva Dc 89 y.o. female MRN: [id number]  Unit/Bed#: W -01 Encounter: [id number]    Images reviewed at morning rounds on 8/12/2024   Patient was seen and examined on 8/12/2024 mild superior endplate T9 compression    Inpatient consult to Neurosurgery  Consult performed by: Sergio Armendariz PA-C  Consult ordered by: Dino Self PA-C        Assessment & Plan               Assessment:  Mild superior endplate T9 compression TLICS 1      Plan:   Exam: Patient alert and oriented, communicates well, moves all extremities, strength is 5/5 bilaterally.  Sensation to light intact throughout.  Tenderness noted at T9 region  Imaging is reviewed personally and findings as follows: CT abdomen pelvis demonstrates mild superior T9 compression deformity and severe chronic T7 compression deformity  Pain control: Per primary team  DVT ppx: SCDs bilateral legs, okay with pharmacological DVT PPx  Activity: As tolerated  PT/OT evaluation & treatment  Brace: Wear TLSO brace when upright and at 45 degrees with  Medical Mx: Per primary team  Neurocheck: Routine  Recommend upright thoracic spine x-rays in brace, pain control, PT/OT and bracing.  Will review the images once it is done.  Call with question or concern.    History of Present Illness     HPI: Oliva Dc is a 89 y.o. female with past medical history of hypertension brought to hospital after she fell down at home and had mild superior endplate compression deformity of T9 vertebral bone.  Patient reports that she lost balance and fell down. she has gait issues at baseline.  Reported severe thoracic back pain, worse with movement.  Denies any radicular symptoms, numbness, weakness or paresthesias extremities.  No bowel or bladder issues.  Denies fever, chills, rigors, cough or chest pain.  Patient is a history of diabetes mellitus, hypertension, congestive failure, stroke, seizures, bleeding disorder or taking anticoagulant  medications.      Review of Systems   Constitutional:  Negative for chills and fever.   Gastrointestinal:  Negative for nausea.   Musculoskeletal:  Positive for back pain and gait problem.   Neurological:  Negative for dizziness, tremors, seizures, syncope, facial asymmetry, speech difficulty, weakness, light-headedness, numbness and headaches.   Psychiatric/Behavioral:  Negative for confusion.        Historical Information   History reviewed. No pertinent past medical history.  History reviewed. No pertinent surgical history.  Social History     Substance and Sexual Activity   Alcohol Use None     Social History     Substance and Sexual Activity   Drug Use Not on file     Social History     Tobacco Use   Smoking Status Not on file   Smokeless Tobacco Not on file     History reviewed. No pertinent family history.    Meds/Allergies   all current active meds have been reviewed and current meds:   Current Facility-Administered Medications   Medication Dose Route Frequency    acetaminophen (TYLENOL) tablet 975 mg  975 mg Oral Q8H ROBERTO    ceftriaxone (ROCEPHIN) 1 g/50 mL in dextrose IVPB  1,000 mg Intravenous Q24H    gabapentin (NEURONTIN) capsule 100 mg  100 mg Oral HS    heparin (porcine) subcutaneous injection 5,000 Units  5,000 Units Subcutaneous Q8H ROBERTO    HYDROmorphone HCl (DILAUDID) injection 0.2 mg  0.2 mg Intravenous Q2H PRN    lidocaine (LIDODERM) 5 % patch 1 patch  1 patch Topical Daily    multi-electrolyte (PLASMALYTE-A/ISOLYTE-S PH 7.4) IV solution  50 mL/hr Intravenous Continuous    naloxone (NARCAN) 0.04 mg/mL syringe 0.04 mg  0.04 mg Intravenous Q1MIN PRN    nystatin (MYCOSTATIN) powder   Topical BID    ondansetron (ZOFRAN) injection 4 mg  4 mg Intravenous Q6H PRN    oxyCODONE (ROXICODONE) split tablet 2.5 mg  2.5 mg Oral Q4H PRN    Or    oxyCODONE (ROXICODONE) IR tablet 5 mg  5 mg Oral Q4H PRN    polyethylene glycol (MIRALAX) packet 17 g  17 g Oral Daily    senna-docusate sodium (SENOKOT S) 8.6-50 mg per  tablet 1 tablet  1 tablet Oral HS     Allergies   Allergen Reactions    Penicillins     Sulfa Antibiotics Other (See Comments)       Objective   I/O         08/10 0701 08/11 0700 08/11 0701 08/12 0700    P.O.  120    IV Piggyback  550    Total Intake(mL/kg)  670 (14.3)    Net  +670                  Physical Exam  Constitutional:       Appearance: Normal appearance.   Pulmonary:      Effort: Pulmonary effort is normal.   Musculoskeletal:         General: Tenderness present.   Neurological:      General: No focal deficit present.      Mental Status: She is alert and oriented to person, place, and time.      Deep Tendon Reflexes:      Reflex Scores:       Tricep reflexes are 2+ on the right side and 2+ on the left side.       Bicep reflexes are 2+ on the right side and 2+ on the left side.       Brachioradialis reflexes are 2+ on the right side and 2+ on the left side.       Patellar reflexes are 2+ on the right side and 2+ on the left side.       Achilles reflexes are 2+ on the right side and 2+ on the left side.  Psychiatric:         Speech: Speech normal.     Neurologic Exam     Mental Status   Oriented to person, place, and time.   Speech: speech is normal   Level of consciousness: alert    Cranial Nerves     CN XI   CN XI normal.     Motor Exam   Muscle bulk: normal  Overall muscle tone: normal  Right arm tone: normal  Left arm tone: normal  Right arm pronator drift: absent  Left arm pronator drift: absent  Right leg tone: normal  Left leg tone: normal    Sensory Exam   Light touch normal.     Gait, Coordination, and Reflexes     Reflexes   Right brachioradialis: 2+  Left brachioradialis: 2+  Right biceps: 2+  Left biceps: 2+  Right triceps: 2+  Left triceps: 2+  Right patellar: 2+  Left patellar: 2+  Right achilles: 2+  Left achilles: 2+  Right : 2+  Left : 2+  Right Shaw: absent  Left Shaw: absent  Right ankle clonus: absent  Left ankle clonus: absent      Vitals:Blood pressure 143/65, pulse 98,  "temperature 97.5 °F (36.4 °C), resp. rate 18, height 5' (1.524 m), weight 46.9 kg (103 lb 6.3 oz), SpO2 90%.,Body mass index is 20.19 kg/m².     Lab Results:   Results from last 7 days   Lab Units 08/11/24  0907   WBC Thousand/uL 14.60*   HEMOGLOBIN g/dL 13.6   HEMATOCRIT % 42.5   PLATELETS Thousands/uL 299   SEGS PCT % 88*   MONO PCT % 4   EOS PCT % 0     Results from last 7 days   Lab Units 08/11/24  0907   POTASSIUM mmol/L 3.7   CHLORIDE mmol/L 105   CO2 mmol/L 23   BUN mg/dL 29*   CREATININE mg/dL 1.15   CALCIUM mg/dL 9.4   ALK PHOS U/L 62   ALT U/L 19   AST U/L 19             Results from last 7 days   Lab Units 08/11/24  0907   INR  0.99   PTT seconds 27     No results found for: \"TROPONINT\"  ABG:No results found for: \"PHART\", \"YQB9TNO\", \"PO2ART\", \"CFO9HCD\", \"Y3XQZSSA\", \"BEART\", \"SOURCE\"    Imaging Studies: I have personally reviewed pertinent reports.   and I have personally reviewed pertinent films in PACS    EKG, Pathology, and Other Studies: I have personally reviewed pertinent reports.   and I have personally reviewed pertinent films in PACS    VTE Prophylaxis: Sequential compression device (Venodyne)     Code Status: Level 3 - DNAR and DNI  Advance Directive and Living Will:      Power of :    POLST:      Counseling / Coordination of Care  I spent 20 minutes with the patient.   "

## 2024-08-12 NOTE — ASSESSMENT & PLAN NOTE
Pt. Is AAOx3 at baseline   Currently AAOx3  Patient scored a 2/5 on mini cog  Pt. Lives at home with daughter   Mostly dependent for instrumental ADLs; patient reports that she cleans the house, vacuums, makes her bed, and takes out the garbage.  Mostly independent for ADLs; requires minimal/occasional assistance with bathing and dressing  Normal behaviors  Was taking donepezil 10 mg daily prior to admission  Consider discontinuing donepezil as it is contraindicated in patients <50 kg; however, patient feels as though it has really helped with her memory. Monitor closely.   TSH 3.59 on 6/24/2024  B12 251 on 8/12/2024  Recommend B12 supplementation 1000 mcg daily  CT shows stable diminished attenuation in the periventricular and subcortical matter likely due to moderate chronic microangiopathy.  [place] to person, place, time, and situation as needed  Monitor for behaviors   Monitor for mental status change  Provide supportive care

## 2024-08-12 NOTE — ASSESSMENT & PLAN NOTE
CT chest abdomen pelvis on 8/11/2024 shows small hiatal hernia  Patient complains of loss of appetite, dysphagia, and cough with increased mucus production  She states that she was taking Robitussin 100 mg/5 ml, 200 mg TID for her cough, but is no longer taking this  Patient currently taking pantoprazole 40 mg daily inpatient  Continue current regimen per primary team   Speech pathology consulted, appreciate input

## 2024-08-12 NOTE — ASSESSMENT & PLAN NOTE
Patient with history of chronic diarrhea.   Provide banatrol as needed for diarrhea  Monitor for dehydration and monitor electrolytes

## 2024-08-12 NOTE — ASSESSMENT & PLAN NOTE
Patient has a history of falls  Most recent fall was one month prior to this event   Per granddaughter, patient has had increased dizziness/light-headedness upon standing, leading to falls   Monitor orthostatic vitals  Fall precautions

## 2024-08-12 NOTE — ASSESSMENT & PLAN NOTE
- Elevated creatinine on admission  - Resolved with IV fluids and UTI treatment with Rocephin and holding home lasix  - restart home lasix as able

## 2024-08-12 NOTE — ASSESSMENT & PLAN NOTE
Patient complains of difficult and painful swallowing  Reports dry cough every time she eats  Patient complains of chronic excessive mucous production and expectoration prior to admission  Recommend trial of scopolamine transdermal patch or glycopyrrolate (Robinul)   Patient complains of right flank pain while eating and after swallowing  Noted to have hypersalivation during the encounter  Speech therapy follows appreciate input  Diet: regular diet and thin liquids   Meds: whole with liquid   Frequent Oral care: 2x/day  Aspiration/Reflux precautions   Other Recommendations/ considerations: consider VBS/barium swallow for formal imaging of swallowing vs GI consult

## 2024-08-12 NOTE — ASSESSMENT & PLAN NOTE
Patient uses a cane at baseline.   History of falls.   Will continue PT/OT.  Placed on fall precautions.

## 2024-08-12 NOTE — PLAN OF CARE
Problem: Potential for Falls  Goal: Patient will remain free of falls  Description: INTERVENTIONS:  - Educate patient/family on patient safety including physical limitations  - Instruct patient to call for assistance with activity   - Consult OT/PT to assist with strengthening/mobility   - Keep Call bell within reach  - Keep bed low and locked with side rails adjusted as appropriate  - Keep care items and personal belongings within reach  - Initiate and maintain comfort rounds  - Make Fall Risk Sign visible to staff  - Offer Toileting every  Hours, in advance of need  - Initiate/Maintain alarm  - Obtain necessary fall risk management equipment:   - Apply yellow socks and bracelet for high fall risk patients  - Consider moving patient to room near nurses station  Outcome: Progressing     Problem: PAIN - ADULT  Goal: Verbalizes/displays adequate comfort level or baseline comfort level  Description: Interventions:  - Encourage patient to monitor pain and request assistance  - Assess pain using appropriate pain scale  - Administer analgesics based on type and severity of pain and evaluate response  - Implement non-pharmacological measures as appropriate and evaluate response  - Consider cultural and social influences on pain and pain management  - Notify physician/advanced practitioner if interventions unsuccessful or patient reports new pain  Outcome: Progressing     Problem: INFECTION - ADULT  Goal: Absence or prevention of progression during hospitalization  Description: INTERVENTIONS:  - Assess and monitor for signs and symptoms of infection  - Monitor lab/diagnostic results  - Monitor all insertion sites, i.e. indwelling lines, tubes, and drains  - Monitor endotracheal if appropriate and nasal secretions for changes in amount and color  - [place] appropriate cooling/warming therapies per order  - Administer medications as ordered  - Instruct and encourage patient and family to use good hand hygiene technique  -  Identify and instruct in appropriate isolation precautions for identified infection/condition  Outcome: Progressing  Goal: Absence of fever/infection during neutropenic period  Description: INTERVENTIONS:  - Monitor WBC    Outcome: Progressing     Problem: SAFETY ADULT  Goal: Patient will remain free of falls  Description: INTERVENTIONS:  - Educate patient/family on patient safety including physical limitations  - Instruct patient to call for assistance with activity   - Consult OT/PT to assist with strengthening/mobility   - Keep Call bell within reach  - Keep bed low and locked with side rails adjusted as appropriate  - Keep care items and personal belongings within reach  - Initiate and maintain comfort rounds  - Make Fall Risk Sign visible to staff  - Offer Toileting every  Hours, in advance of need  - Initiate/Maintain alarm  - Obtain necessary fall risk management equipment:   - Apply yellow socks and bracelet for high fall risk patients  - Consider moving patient to room near nurses station  Outcome: Progressing  Goal: Maintain or return to baseline ADL function  Description: INTERVENTIONS:  -  Assess patient's ability to carry out ADLs; assess patient's baseline for ADL function and identify physical deficits which impact ability to perform ADLs (bathing, care of mouth/teeth, toileting, grooming, dressing, etc.)  - Assess/evaluate cause of self-care deficits   - Assess range of motion  - Assess patient's mobility; develop plan if impaired  - Assess patient's need for assistive devices and provide as appropriate  - Encourage maximum independence but intervene and supervise when necessary  - Involve family in performance of ADLs  - Assess for home care needs following discharge   - Consider OT consult to assist with ADL evaluation and planning for discharge  - Provide patient education as appropriate  Outcome: Progressing  Goal: Maintains/Returns to pre admission functional level  Description:  INTERVENTIONS:  - Perform AM-PAC 6 Click Basic Mobility/ Daily Activity assessment daily.  - Set and communicate daily mobility goal to care team and patient/family/caregiver.   - Collaborate with rehabilitation services on mobility goals if consulted  - Perform Range of Motion  times a day.  - Reposition patient every  hours.  - Dangle patient  times a day  - Stand patient  times a day  - Ambulate patient  times a day  - Out of bed to chair  times a day   - Out of bed for meals  times a day  - Out of bed for toileting  - Record patient progress and toleration of activity level   Outcome: Progressing     Problem: DISCHARGE PLANNING  Goal: Discharge to home or other facility with appropriate resources  Description: INTERVENTIONS:  - Identify barriers to discharge w/patient and caregiver  - Arrange for needed discharge resources and transportation as appropriate  - Identify discharge learning needs (meds, wound care, etc.)  - Arrange for interpretive services to assist at discharge as needed  - Refer to Case Management Department for coordinating discharge planning if the patient needs post-hospital services based on physician/advanced practitioner order or complex needs related to functional status, cognitive ability, or social support system  Outcome: Progressing     Problem: Knowledge Deficit  Goal: Patient/family/caregiver demonstrates understanding of disease process, treatment plan, medications, and discharge instructions  Description: Complete learning assessment and assess knowledge base.  Interventions:  - Provide teaching at level of understanding  - Provide teaching via preferred learning methods  Outcome: Progressing     Problem: Prexisting or High Potential for Compromised Skin Integrity  Goal: Skin integrity is maintained or improved  Description: INTERVENTIONS:  - Identify patients at risk for skin breakdown  - Assess and monitor skin integrity  - Assess and monitor nutrition and hydration status  -  Monitor labs   - Assess for incontinence   - Turn and reposition patient  - Assist with mobility/ambulation  - Relieve pressure over bony prominences  - Avoid friction and shearing  - Provide appropriate hygiene as needed including keeping skin clean and dry  - Evaluate need for skin moisturizer/barrier cream  - Collaborate with interdisciplinary team   - Patient/family teaching  - Consider wound care consult   Outcome: Progressing

## 2024-08-12 NOTE — SPEECH THERAPY NOTE
"Speech-Language Pathology Bedside Swallow Evaluation        Patient Name: Oliva Dc    Today's Date: 8/12/2024     Problem List  Principal Problem:    Closed fracture of ninth thoracic vertebra (HCC)  Active Problems:    Multiple falls    Acute UTI (urinary tract infection)    Acute kidney injury (HCC)    Ambulatory dysfunction    Dementia without behavioral disturbance (HCC)    Hypertension    Chronic diarrhea    At risk for delirium         Past Medical History  History reviewed. No pertinent past medical history.    Past Surgical History  History reviewed. No pertinent surgical history.    Summary    Pt presents w/ pharyngeal vs esophageal dysphagia symptoms, pt c/o chronic excessive mucous production & expectoration prior to admission. Pt reported she was told she has a hiatal hernia; she admits to R flank pain after swallowing/when eating. Pt questioned \" do you think it's my nerves?\" Pt initially c/o sticking and pain w/ swallowing applesauce, but stated \"that went down better\" when eating toast. Pt took sips of water in between bites of food w/o coughing, throat clearing.      Recommendations:   Diet: regular diet and thin liquids   Meds: whole with liquid   Frequent Oral care: 2x/day  Aspiration/Reflux precautions   Other Recommendations/ considerations: consider VBS/barium swallow for formal imaging of swallowing vs GI consult       Current Medical Status  Pt is a 89 y.o. female who [facility] to [facility]  with s/p fall, fx of 9th thoracic vertebra.    Pt c/o having trouble swallowing recently since fall. Pt reported she has a hernia in her esophagus, no GI notes, EGD or GI imaging noted in our EPIC system.     Past medical history:   Please see H&P for details    Special Studies:  CT: chest/abd/pelvis: 8/11/24 LUNGS: Mild basilar subsegmental atelectasis. Subsegmental atelectasis in the right middle lobe and possible small amount of fluid in the horizontal " "fissure.    Social/Education/Vocational Hx:  Pt lives with family    Swallow Information   Prior speech/swallowing tx: none   Current Risks for Dysphagia & Aspiration:  advanced age, generalized weakness  Current Symptoms/Concerns:  c/o \"I can't swallow right\" repeatedly, c/o trouble w/ mucous, always spitting out.   Current Diet: regular diet and thin liquids   Baseline Diet: regular diet and thin liquids  Takes pills- whole w/ water     Baseline Assessment   Behavior/Cognition: alert  Speech/Language Status: able to participate in conversation and able to follow commands  Patient Positioning: upright in bed     Swallow Mechanism Exam *Pt noted to be spitting out clear thin mucous/saliva; yankaur suction set up.     Facial: symmetrical  Labial: WFL  Lingual: WFL  Velum: unable to visualize  Mandible: adequate ROM  Dentition: adequate  Vocal quality:clear/adequate   Volitional Cough: strong/productive   Respiratory: RA    Consistencies Assessed and Performance   Consistencies Administered: thin liquids, puree, and soft solids (toast)    Oral Stage: pt was able to bite toast, drink water from straw w/ good oral control. Mastication/manipulation of puree and soft/solids appeared effective/WNL.  Timely transfer noted.     Pharyngeal Stage: swallow initiation was functional to mildly delayed but complete. Pt c/o applesauce sticking in her throat and some pain w/ swallowing, pt encouraged to take sips of water in between which reportedly helped. Pt reported toast went down better than applesauce and did not get stuck and no further pain. No coughing, throat clearing or wet voice noted.       Esophageal Concerns: belching- occasional      Results Reviewed with: patient       Emani Hope MA CCC-SLP  Speech Pathologist  Available via SecureChat             "

## 2024-08-12 NOTE — PHYSICAL THERAPY NOTE
PHYSICAL THERAPY NOTE          Patient Name: Oliva Dc  Today's Date: 8/12/2024 08/12/24 1357   PT Last Visit   PT Visit Date 08/12/24   Note Type   Note Type Treatment   Pain Assessment   Pain Assessment Tool 0-10   Pain Score No Pain   Pain Location/Orientation Location: Back   Hospital Pain Intervention(s) Repositioned;Ambulation/increased activity;Rest   Multiple Pain Sites No   Pain Rating: FLACC (Rest) - Face 0   Pain Rating: FLACC (Rest) - Legs 0   Pain Rating: FLACC (Rest) - Activity 0   Pain Rating: FLACC (Rest) - Cry 0   Pain Rating: FLACC (Rest) - Consolability 0   Score: FLACC (Rest) 0   Pain Rating: FLACC (Activity) - Face 0   Pain Rating: FLACC (Activity) - Legs 0   Pain Rating: FLACC (Activity) - Activity 0   Pain Rating: FLACC (Activity) - Cry 0   Pain Rating: FLACC (Activity) - Consolability 0   Score: FLACC (Activity) 0   Restrictions/Precautions   Braces or Orthoses   (backpack TLSO >45 and when OOB)   Other Precautions Chair Alarm;Bed Alarm;Fall Risk;Pain;Hard of hearing   General   Chart Reviewed Yes   Response to Previous Treatment Patient with no complaints from previous session.   Family/Caregiver Present No   Cognition   Overall Cognitive Status Unable to assess   Arousal/Participation Alert;Responsive;Cooperative   Attention Attends with cues to redirect   Orientation Level Oriented to person;Oriented to place;Oriented to situation;Disoriented to time   Memory Unable to assess   Following Commands Follows one step commands with increased time or repetition   Comments pt was pleasant and cooperatiev throughout PT intervention   Subjective   Subjective pt was agreeable to participate in PT intervention as pt stated no pain pre/post tx session   Bed Mobility   Supine to Sit 4  Minimal assistance   Additional items Assist x 1;HOB elevated;Bedrails;Increased time required;Verbal cues;LE  management;Other  (trunk management)   Sit to Supine Unable to assess   Additional Comments pt seated OOB in the recliner post tx session with call bell, chair alarm activated and all pt needs met. pt was able to sit EOB w/o LOB while donning TLSO with max Ax1 for donning   Transfers   Sit to Stand 4  Minimal assistance   Additional items Assist x 1;Increased time required;Verbal cues  (w/ RW)   Stand to Sit 4  Minimal assistance   Additional items Assist x 1;Increased time required;Verbal cues;Armrests   Stand pivot 4  Minimal assistance   Additional items Assist x 1;Armrests;Increased time required;Verbal cues   Toilet transfer 4  Minimal assistance   Additional items Assist x 1;Armrests;Increased time required;Verbal cues;Commode   Additional Comments pt required max Ax1 for donning of back pack TLSO and min Ax1 for all functional transfers to and from RW with VC's for hand placement while ascending to RW and descending into recliner/commode   Ambulation/Elevation   Gait pattern Decreased foot clearance;Excessively slow;Step to;Decreased hip extension;Decreased heel strike;Decreased toe off   Gait Assistance 4  Minimal assist   Additional items Assist x 1;Verbal cues   Assistive Device Rolling walker   Distance 10'x1 RW   Stair Management Assistance Not tested   Balance   Static Sitting Fair +   Static Standing Poor +   Ambulatory Poor +  (w/ RW)   Endurance Deficit   Endurance Deficit Yes   Endurance Deficit Description limited activity tolerance, functional mobility and ambulation distance   Activity Tolerance   Activity Tolerance Patient limited by fatigue;Other (Comment)  (generalized weakness)   Nurse Made Aware Spoke to DAVIS Vero   Exercises   Quad Sets Sitting;10 reps;AROM;Bilateral  (limited quad contraction)   Hip Abduction Sitting;10 reps;AROM;Bilateral   Hip Adduction Sitting;10 reps;AROM;Bilateral  (pillow squeezes)   Knee AROM Long Arc Quad Sitting;10 reps;AROM;Bilateral   Ankle Pumps Sitting;20  reps;AROM;Bilateral  (long sit position)   Assessment   Problem List Decreased strength;Decreased endurance;Impaired balance;Decreased mobility;Decreased safety awareness;Pain;Orthopedic restrictions;Impaired hearing   Assessment pt began tx session lying supine in [facility] bed as pt was agreeable to participate in PT intervention. Pt to focus on bed mobility, transfer training, TE activities, posture/balance w/ gait and increasing pt activity tolerance. In comnparison to previous tx sessions pt continues to require min Ax1 for all bed mobility, functional transfers  to and from RW with VC's for hand placement. pt is also min Ax1 for short distance ambulation as pt ambulated 10'x1 RW. Additional was not possible due to fatigue and generalized weakness. pt did participate in TE activities while seated in recliner with AROM, no increases in pain in order to strengthen LE's in efforts in reducing risk for falls and injuries with all OOB activities. Post tx pt in recliner with call bell, chair alarm activated and all pt needs met. Continue to recomemdn Dc w/ level 2 moderate rehab resource intensity when medically cleared   Goals   Patient Goals to walk more   STG Expiration Date 08/25/24   PT Treatment Day 2   Plan   Treatment/Interventions Functional transfer training;LE strengthening/ROM;Elevations;Therapeutic exercise;Endurance training;Patient/family training;Equipment eval/education;Bed mobility;Gait training;Spoke to nursing   Progress Slow progress, decreased activity tolerance   PT Frequency 3-5x/wk   Discharge Recommendation   Rehab Resource Intensity Level, PT II (Moderate Resource Intensity)   AM-PAC Basic Mobility Inpatient   Turning in Flat Bed Without Bedrails 3   Lying on Back to Sitting on Edge of Flat Bed Without Bedrails 3   Moving Bed to Chair 3   Standing Up From Chair Using Arms 3   Walk in Room 3   Climb 3-5 Stairs With Railing 1   Basic Mobility Inpatient Raw Score 16   Basic Mobility Standardized  Score 38.32   [facility] Highest Level Of Mobility   -[facility] Goal 5: Stand one or more mins   -HL Achieved 6: Walk 10 steps or more   Education   Education Provided Mobility training;Assistive device   Patient Reinforcement needed   End of Consult   Patient Position at End of Consult Bedside chair;Bed/Chair alarm activated;All needs within reach   The patient's AM-PAC Basic Mobility Inpatient Short Form Raw Score is 16. A Raw score of less than or equal to 16 suggests the patient may benefit from discharge to post-acute rehabilitation services. Please also refer to the recommendation of the Physical Therapist for safe discharge planning.    Brian Latham

## 2024-08-13 ENCOUNTER — APPOINTMENT (INPATIENT)
Dept: RADIOLOGY | Facility: HOSPITAL | Age: 89
DRG: 552 | End: 2024-08-13
Payer: COMMERCIAL

## 2024-08-13 PROBLEM — E55.9 VITAMIN D INSUFFICIENCY: Status: ACTIVE | Noted: 2024-08-13

## 2024-08-13 PROBLEM — I48.92 ATRIAL FLUTTER (HCC): Status: ACTIVE | Noted: 2024-08-13

## 2024-08-13 LAB
2HR DELTA HS TROPONIN: -3 NG/L
4HR DELTA HS TROPONIN: -7 NG/L
ANION GAP SERPL CALCULATED.3IONS-SCNC: 6 MMOL/L (ref 4–13)
BASOPHILS # BLD AUTO: 0.04 THOUSANDS/ÂΜL (ref 0–0.1)
BASOPHILS NFR BLD AUTO: 1 % (ref 0–1)
BUN SERPL-MCNC: 24 MG/DL (ref 5–25)
CALCIUM SERPL-MCNC: 8.4 MG/DL (ref 8.4–10.2)
CARDIAC TROPONIN I PNL SERPL HS: 24 NG/L
CARDIAC TROPONIN I PNL SERPL HS: 28 NG/L
CARDIAC TROPONIN I PNL SERPL HS: 31 NG/L
CHLORIDE SERPL-SCNC: 107 MMOL/L (ref 96–108)
CO2 SERPL-SCNC: 26 MMOL/L (ref 21–32)
CREAT SERPL-MCNC: 0.96 MG/DL (ref 0.6–1.3)
EOSINOPHIL # BLD AUTO: 0.28 THOUSAND/ÂΜL (ref 0–0.61)
EOSINOPHIL NFR BLD AUTO: 4 % (ref 0–6)
ERYTHROCYTE [DISTWIDTH] IN BLOOD BY AUTOMATED COUNT: 13.5 % (ref 11.6–15.1)
GFR SERPL CREATININE-BSD FRML MDRD: 48 ML/MIN/1.73SQ M
GLUCOSE SERPL-MCNC: 97 MG/DL (ref 65–140)
HCT VFR BLD AUTO: 37 % (ref 34.8–46.1)
HGB BLD-MCNC: 11.6 G/DL (ref 11.5–15.4)
IMM GRANULOCYTES # BLD AUTO: 0.05 THOUSAND/UL (ref 0–0.2)
IMM GRANULOCYTES NFR BLD AUTO: 1 % (ref 0–2)
LYMPHOCYTES # BLD AUTO: 1.97 THOUSANDS/ÂΜL (ref 0.6–4.47)
LYMPHOCYTES NFR BLD AUTO: 25 % (ref 14–44)
MCH RBC QN AUTO: 31.5 PG (ref 26.8–34.3)
MCHC RBC AUTO-ENTMCNC: 31.4 G/DL (ref 31.4–37.4)
MCV RBC AUTO: 101 FL (ref 82–98)
MONOCYTES # BLD AUTO: 0.64 THOUSAND/ÂΜL (ref 0.17–1.22)
MONOCYTES NFR BLD AUTO: 8 % (ref 4–12)
NEUTROPHILS # BLD AUTO: 4.88 THOUSANDS/ÂΜL (ref 1.85–7.62)
NEUTS SEG NFR BLD AUTO: 61 % (ref 43–75)
NRBC BLD AUTO-RTO: 0 /100 WBCS
PLATELET # BLD AUTO: 239 THOUSANDS/UL (ref 149–390)
PMV BLD AUTO: 10.1 FL (ref 8.9–12.7)
POTASSIUM SERPL-SCNC: 3.7 MMOL/L (ref 3.5–5.3)
RBC # BLD AUTO: 3.68 MILLION/UL (ref 3.81–5.12)
SODIUM SERPL-SCNC: 139 MMOL/L (ref 135–147)
WBC # BLD AUTO: 7.86 THOUSAND/UL (ref 4.31–10.16)

## 2024-08-13 PROCEDURE — 99232 SBSQ HOSP IP/OBS MODERATE 35: CPT | Performed by: SURGERY

## 2024-08-13 PROCEDURE — 85025 COMPLETE CBC W/AUTO DIFF WBC: CPT | Performed by: SURGERY

## 2024-08-13 PROCEDURE — 93005 ELECTROCARDIOGRAM TRACING: CPT

## 2024-08-13 PROCEDURE — 92611 MOTION FLUOROSCOPY/SWALLOW: CPT

## 2024-08-13 PROCEDURE — 80048 BASIC METABOLIC PNL TOTAL CA: CPT | Performed by: SURGERY

## 2024-08-13 PROCEDURE — 84484 ASSAY OF TROPONIN QUANT: CPT | Performed by: NURSE PRACTITIONER

## 2024-08-13 PROCEDURE — 71045 X-RAY EXAM CHEST 1 VIEW: CPT

## 2024-08-13 PROCEDURE — 99222 1ST HOSP IP/OBS MODERATE 55: CPT | Performed by: INTERNAL MEDICINE

## 2024-08-13 PROCEDURE — 74230 X-RAY XM SWLNG FUNCJ C+: CPT

## 2024-08-13 PROCEDURE — 99232 SBSQ HOSP IP/OBS MODERATE 35: CPT | Performed by: FAMILY MEDICINE

## 2024-08-13 RX ORDER — MAGNESIUM HYDROXIDE/ALUMINUM HYDROXICE/SIMETHICONE 120; 1200; 1200 MG/30ML; MG/30ML; MG/30ML
30 SUSPENSION ORAL EVERY 4 HOURS PRN
Status: DISCONTINUED | OUTPATIENT
Start: 2024-08-13 | End: 2024-08-15 | Stop reason: HOSPADM

## 2024-08-13 RX ORDER — METHOCARBAMOL 500 MG/1
250 TABLET, FILM COATED ORAL EVERY 6 HOURS PRN
Status: DISCONTINUED | OUTPATIENT
Start: 2024-08-13 | End: 2024-08-15 | Stop reason: HOSPADM

## 2024-08-13 RX ORDER — SODIUM CHLORIDE, SODIUM GLUCONATE, SODIUM ACETATE, POTASSIUM CHLORIDE, MAGNESIUM CHLORIDE, SODIUM PHOSPHATE, DIBASIC, AND POTASSIUM PHOSPHATE .53; .5; .37; .037; .03; .012; .00082 G/100ML; G/100ML; G/100ML; G/100ML; G/100ML; G/100ML; G/100ML
500 INJECTION, SOLUTION INTRAVENOUS ONCE
Status: COMPLETED | OUTPATIENT
Start: 2024-08-13 | End: 2024-08-13

## 2024-08-13 RX ORDER — HYDROXYZINE HYDROCHLORIDE 10 MG/1
10 TABLET, FILM COATED ORAL ONCE
Status: COMPLETED | OUTPATIENT
Start: 2024-08-13 | End: 2024-08-13

## 2024-08-13 RX ORDER — METOPROLOL SUCCINATE 25 MG/1
25 TABLET, EXTENDED RELEASE ORAL DAILY
Status: DISCONTINUED | OUTPATIENT
Start: 2024-08-13 | End: 2024-08-15 | Stop reason: HOSPADM

## 2024-08-13 RX ORDER — METOPROLOL TARTRATE 1 MG/ML
5 INJECTION, SOLUTION INTRAVENOUS ONCE
Status: COMPLETED | OUTPATIENT
Start: 2024-08-13 | End: 2024-08-13

## 2024-08-13 RX ORDER — MAGNESIUM SULFATE HEPTAHYDRATE 40 MG/ML
2 INJECTION, SOLUTION INTRAVENOUS ONCE
Status: COMPLETED | OUTPATIENT
Start: 2024-08-13 | End: 2024-08-14

## 2024-08-13 RX ADMIN — MIRTAZAPINE 15 MG: 15 TABLET, FILM COATED ORAL at 21:01

## 2024-08-13 RX ADMIN — GABAPENTIN 100 MG: 100 CAPSULE ORAL at 21:01

## 2024-08-13 RX ADMIN — ACETAMINOPHEN 975 MG: 325 TABLET, FILM COATED ORAL at 21:01

## 2024-08-13 RX ADMIN — ALUMINUM HYDROXIDE, MAGNESIUM HYDROXIDE, DIMETHICONE 30 ML: 200; 200; 20 LIQUID ORAL at 10:54

## 2024-08-13 RX ADMIN — DONEPEZIL HYDROCHLORIDE 10 MG: 5 TABLET ORAL at 21:01

## 2024-08-13 RX ADMIN — HEPARIN SODIUM 5000 UNITS: 5000 INJECTION INTRAVENOUS; SUBCUTANEOUS at 13:17

## 2024-08-13 RX ADMIN — HYDROXYZINE HYDROCHLORIDE 10 MG: 10 TABLET ORAL at 15:57

## 2024-08-13 RX ADMIN — HEPARIN SODIUM 5000 UNITS: 5000 INJECTION INTRAVENOUS; SUBCUTANEOUS at 21:01

## 2024-08-13 RX ADMIN — CEFTRIAXONE SODIUM 1000 MG: 10 INJECTION, POWDER, FOR SOLUTION INTRAVENOUS at 09:25

## 2024-08-13 RX ADMIN — HEPARIN SODIUM 5000 UNITS: 5000 INJECTION INTRAVENOUS; SUBCUTANEOUS at 05:14

## 2024-08-13 RX ADMIN — SODIUM CHLORIDE, SODIUM GLUCONATE, SODIUM ACETATE, POTASSIUM CHLORIDE AND MAGNESIUM CHLORIDE 500 ML: 526; 502; 368; 37; 30 INJECTION, SOLUTION INTRAVENOUS at 13:10

## 2024-08-13 RX ADMIN — METOROPROLOL TARTRATE 5 MG: 5 INJECTION, SOLUTION INTRAVENOUS at 12:58

## 2024-08-13 RX ADMIN — Medication 2.5 MG: at 10:54

## 2024-08-13 RX ADMIN — AMLODIPINE BESYLATE 2.5 MG: 2.5 TABLET ORAL at 09:20

## 2024-08-13 RX ADMIN — PANTOPRAZOLE SODIUM 40 MG: 40 TABLET, DELAYED RELEASE ORAL at 05:14

## 2024-08-13 RX ADMIN — ACETAMINOPHEN 975 MG: 325 TABLET, FILM COATED ORAL at 05:14

## 2024-08-13 RX ADMIN — NYSTATIN: 100000 POWDER TOPICAL at 09:21

## 2024-08-13 RX ADMIN — MAGNESIUM SULFATE HEPTAHYDRATE 2 G: 40 INJECTION, SOLUTION INTRAVENOUS at 13:09

## 2024-08-13 RX ADMIN — METOPROLOL SUCCINATE 25 MG: 25 TABLET, EXTENDED RELEASE ORAL at 15:57

## 2024-08-13 RX ADMIN — SERTRALINE HYDROCHLORIDE 50 MG: 50 TABLET ORAL at 09:21

## 2024-08-13 NOTE — UTILIZATION REVIEW
Continued Stay Review    SEE INITIAL REVIEW AT BOTTOM    Date: 08/13                          Current Patient Class: Inpatient  Current Level of Care: level 2 stepdown/HOT    HPI:89 y.o. female initially admitted on  08/11    Assessment/Plan:   Date: 08/13  Day 3: Has surpassed a 2nd midnight with active treatments and services.  Pt reports feeling globally weak. LA, TAMEKA resolved w/ IVFs. UTI improved w/ IV abx. Bld cxs no growth at 24 hrs. Restart home Lasix as able. Cont  regular neurologic checks. Pain control. Eval and mobilize per PT/OT when able. DVT ppx. TLSO brace to be worn PRN for comfort as tolerated. Per neurosurg, no nsx intervention is anticipated at this time. Recommend conservative management with pain control and PT as needed.

## 2024-08-13 NOTE — DISCHARGE INSTR - AVS FIRST PAGE
Neurosurgery discharge instructions following spine fracture:     You can have TLSO brace as needed for comfort or as tolerated, though not required.   No bending, twisting or heavy lifting. No strenuous activities. No Driving.   Recommended to refrain from strenuous activity.  Recommended to refrain from bending and twisting back.  Recommended to limit lifting, pulling, pushing to no more then 10 lbs.  Recommended that pt take fall precautions and refrain from activity that increases chance of fall or injury to back.     **Please notify MD immediately if you have increased back or leg pain. New numbness, tingling and/or weakness in your legs.**

## 2024-08-13 NOTE — CONSULTS
Cardiology   Oliva MARYJANE Dc 89 y.o. female MRN: [id number]  Unit/Bed#: S -01 Encounter: [id number]      Reason for Consult / Principal Problem: Atrial flutter    Physician Requesting Consult:  Neris Pruett MD    Outpatient Cardiologist: None    Assessment  1.  New onset atrial flutter with RVR  -Asymptomatic.  No complaints of CP or palpitations.  -ECG 8/11 (admission) -NSR with PACs.  -ECG 8/13 - atrial flutter, variable AVB, rate 144 bpm.  -Telemetry review -NSR, heart rates currently in the 60s.  -Not previously or currently on AV chi blocking agents.  -CHADS2 Vascor = 4  -TSH level 3.59.  2. Hypertension  -BP last recorded at 137/57.  -Outpatient BP regimen; amlodipine 2.5 mg daily  -Inpatient BP regimen; amlodipine 2.5 mg daily.  3. Traumatic falls w/ acute/subacute T7/T9 fracture.  -Management per the trauma/neurosurgery team.  -Preceding symptoms of dizziness with subsequent gait instability and fall.  Possibly secondary to orthostasis in the context of dehydration/notable TAMEKA in the presence of poor oral intake due to difficulty swallowing.    Plan  -Pt denies any specific cardiac or respiratory complaints at the time of my evaluation.  BP stable last recorded 137/57.  Maintaining stable O2 saturations on 1L NC.   ECG/telemetry reviewed.  ECG obtained prior to transfer to the MedSurg/telemetry unit demonstrated atrial flutter with RVR rate 144 bpm.  PT asymptomatic.  Resolved with administration of IV Lopressor.  Currently maintaining NSR with heart rates in the 60s.  Start Toprol-XL 25 mg daily.  -We discussed risks/benefits of systemic AC initiation.  Tachyarrhythmia possibly precipitated by acute traumatic injury/pain response, dehydration/TAMEKA and UTI.  Isolated episode of atrial flutter, no prior documented history of.  Would favor holding off on systemic AC initiation for now.  Continue to monitor on telemetry while hospitalized.  If recurrence we can revisit this topic.  Higher risk  of a major bleeding event given her advanced age/history of dementia and propensity for falls.  -Obtain orthostatic blood pressures.   -Obtain TSH level.  -Continue to monitor on telemetry.      HPI: Oliva Dc 89 y.o. year old female with a medical history of hypertension.  Non-smoker denies excessive alcohol or recreational/illicit drug use.  Did not previously she did not previously follow with a routine Cardiology provider prior to admission.  She presents to the [facility] on 8/11/2024 after unfortunately sustaining a fall at her home residence.  She also reports a second fall approximately 1 month prior to this episode.  Both events had occurred in the early morning hours in which the patient woke up from sleep and had to use the restroom.  She states during each of these events she had preceding symptoms of dizziness, became unsteady and fell the first time in her bathroom tub and then the second time on her bedroom floor.  Immediately following this most recent fall she had complaints of back pain.  She reports over the past couple of weeks that she has had poor oral intake of both food and fluids.  She states after her fall about 1 month ago she has been experiencing difficulty swallowing and a nonproductive cough.    Upon further workup by the trauma team she was found to have an acute vs subacute T7 fx and age indeterminate T9 fx. She was evaluated by the neurosurgery team whom recommended nonsurgical intervention.  Upon admission she also met SIRS criteria, and later found to have an acute kidney injury and an acute urinary tract infection in which she was started on IV fluids and IV antibiotics.  She was mildly tachycardic on admission.  However after review of her admission ECG she was in normal sinus rhythm with occasional PACs.   She was transferred to the [facility] unit.  Today she was noted to have been tachycardic on vital sign monitoring.  ECG obtained which demonstrated atrial  flutter with variable AVB, rate 144 bpm.  She was transferred over to the MedSur/telemetry unit for further monitoring.  Prior to transfer she did receive a dose of IV Lopressor.  Per telemetry review, she is currently maintaining NSR with heart rates in the 60s.  Cardiology has now been consulted for further treatment recommendations/management.      Family History: History reviewed. No pertinent family history.  Historical Information   History reviewed. No pertinent past medical history.  History reviewed. No pertinent surgical history.  Social History   Social History     Substance and Sexual Activity   Alcohol Use None     Social History     Substance and Sexual Activity   Drug Use Not on file     Social History     Tobacco Use   Smoking Status Not on file   Smokeless Tobacco Not on file     Family History: History reviewed. No pertinent family history.    Review of Systems:  Review of Systems   Constitutional:  Negative for chills, fatigue and fever.   Eyes:  Negative for visual disturbance.   Respiratory:  Negative for cough, chest tightness and shortness of breath.    Cardiovascular:  Negative for chest pain, palpitations and leg swelling.   Gastrointestinal:  Negative for abdominal pain.   Neurological:  Negative for dizziness, light-headedness and headaches.   All other systems reviewed and are negative.          Scheduled Meds:  Current Facility-Administered Medications   Medication Dose Route Frequency Provider Last Rate    acetaminophen  975 mg Oral Q8H ROBERTO Self PA-C      aluminum-magnesium hydroxide-simethicone  30 mL Oral Q4H PRN SILVIA Bojorquez      amLODIPine  2.5 mg Oral Daily Jose D Carranza PA-C      donepezil  10 mg Oral HS Jose D Carranza PA-C      gabapentin  100 mg Oral HS Dino Self PA-C      heparin (porcine)  5,000 Units Subcutaneous Q8H ROBERTO Self PA-C      hydrOXYzine HCL  10 mg Oral Once SILVIA Bojorquez      lidocaine  1 patch Topical Daily  Dino Self PA-C      magnesium sulfate  2 g Intravenous Once Héctor Benjamin MD 2 g (08/13/24 1309)    methocarbamol  250 mg Oral Q6H PRN SILVIA Bojorquez      mirtazapine  15 mg Oral HS Jose D Carranza PA-C      naloxone  0.04 mg Intravenous Q1MIN PRN Dino Self PA-C      nystatin   Topical BID Isabel Gardiner PA-C      ondansetron  4 mg Intravenous Q6H PRN Dino Self PA-C      oxyCODONE  2.5 mg Oral Q4H PRN Dino Self PA-C      Or    oxyCODONE  5 mg Oral Q4H PRN Dino Self PA-C      pantoprazole  40 mg Oral Early Morning Jose D Carranza PA-C      polyethylene glycol  17 g Oral Daily Dino Self PA-C      senna-docusate sodium  1 tablet Oral HS Dino Self PA-C      sertraline  50 mg Oral Daily Jose D Carranza PA-C       Continuous Infusions:   PRN Meds:.  aluminum-magnesium hydroxide-simethicone    methocarbamol    naloxone    ondansetron    oxyCODONE **OR** oxyCODONE  all current active meds have been reviewed and current meds:   Current Facility-Administered Medications   Medication Dose Route Frequency    acetaminophen (TYLENOL) tablet 975 mg  975 mg Oral Q8H ROBERTO    aluminum-magnesium hydroxide-simethicone (MAALOX) oral suspension 30 mL  30 mL Oral Q4H PRN    amLODIPine (NORVASC) tablet 2.5 mg  2.5 mg Oral Daily    donepezil (ARICEPT) tablet 10 mg  10 mg Oral HS    gabapentin (NEURONTIN) capsule 100 mg  100 mg Oral HS    heparin (porcine) subcutaneous injection 5,000 Units  5,000 Units Subcutaneous Q8H ROBERTO    hydrOXYzine HCL (ATARAX) tablet 10 mg  10 mg Oral Once    lidocaine (LIDODERM) 5 % patch 1 patch  1 patch Topical Daily    magnesium sulfate 2 g/50 mL IVPB (premix) 2 g  2 g Intravenous Once    methocarbamol (ROBAXIN) tablet 250 mg  250 mg Oral Q6H PRN    mirtazapine (REMERON) tablet 15 mg  15 mg Oral HS    naloxone (NARCAN) 0.04 mg/mL syringe 0.04 mg  0.04 mg Intravenous Q1MIN PRN    nystatin (MYCOSTATIN) powder   Topical BID    ondansetron (ZOFRAN)  injection 4 mg  4 mg Intravenous Q6H PRN    oxyCODONE (ROXICODONE) split tablet 2.5 mg  2.5 mg Oral Q4H PRN    Or    oxyCODONE (ROXICODONE) IR tablet 5 mg  5 mg Oral Q4H PRN    pantoprazole (PROTONIX) EC tablet 40 mg  40 mg Oral Early Morning    polyethylene glycol (MIRALAX) packet 17 g  17 g Oral Daily    senna-docusate sodium (SENOKOT S) 8.6-50 mg per tablet 1 tablet  1 tablet Oral HS    sertraline (ZOLOFT) tablet 50 mg  50 mg Oral Daily       Allergies   Allergen Reactions    Penicillins     Sulfa Antibiotics Other (See Comments)       Objective   Vitals: Blood pressure 137/57, pulse (!) 131, temperature 97.6 °F (36.4 °C), resp. rate 18, height 5' (1.524 m), weight 46.9 kg (103 lb 6.3 oz), SpO2 96%., Body mass index is 20.19 kg/m².,   Orthostatic Blood Pressures      Flowsheet Row Most Recent Value   Blood Pressure 137/57 filed at 08/13/2024 1405   Patient Position - Orthostatic VS Lying filed at 08/13/2024 1220            No intake or output data in the 24 hours ending 08/13/24 1452    Invasive Devices       Peripheral Intravenous Line  Duration             Peripheral IV 08/11/24 Left;Ventral (anterior) Forearm 2 days    Peripheral IV 08/11/24 Right;Ventral (anterior) Forearm 2 days                    Physical Exam:  Physical Exam  Vitals and nursing note reviewed.   Constitutional:       General: She is not in acute distress.     Appearance: She is not diaphoretic.   HENT:      Head: Normocephalic and atraumatic.   Eyes:      General: No scleral icterus.  Cardiovascular:      Rate and Rhythm: Normal rate and regular rhythm.      Pulses: Normal pulses.      Heart sounds: Normal heart sounds.   Pulmonary:      Effort: Pulmonary effort is normal.      Breath sounds: Normal breath sounds. No wheezing or rales.   Abdominal:      Palpations: Abdomen is soft.   Musculoskeletal:      Right lower leg: No edema.      Left lower leg: No edema.   Skin:     General: Skin is warm and dry.      Capillary Refill: Capillary  refill takes less than 2 seconds.   Neurological:      General: No focal deficit present.      Mental Status: She is alert and oriented to person, place, and time.   Psychiatric:         Mood and Affect: Mood normal.         Lab Results:   Recent Results (from the past 24 hour(s))   Basic metabolic panel    Collection Time: 08/13/24  6:37 AM   Result Value Ref Range    Sodium 139 135 - 147 mmol/L    Potassium 3.7 3.5 - 5.3 mmol/L    Chloride 107 96 - 108 mmol/L    CO2 26 21 - 32 mmol/L    ANION GAP 6 4 - 13 mmol/L    BUN 24 5 - 25 mg/dL    Creatinine 0.96 0.60 - 1.30 mg/dL    Glucose 97 65 - 140 mg/dL    Calcium 8.4 8.4 - 10.2 mg/dL    eGFR 48 ml/min/1.73sq m   CBC and differential    Collection Time: 08/13/24  6:37 AM   Result Value Ref Range    WBC 7.86 4.31 - 10.16 Thousand/uL    RBC 3.68 (L) 3.81 - 5.12 Million/uL    Hemoglobin 11.6 11.5 - 15.4 g/dL    Hematocrit 37.0 34.8 - 46.1 %     (H) 82 - 98 fL    MCH 31.5 26.8 - 34.3 pg    MCHC 31.4 31.4 - 37.4 g/dL    RDW 13.5 11.6 - 15.1 %    MPV 10.1 8.9 - 12.7 fL    Platelets 239 149 - 390 Thousands/uL    nRBC 0 /100 WBCs    Segmented % 61 43 - 75 %    Immature Grans % 1 0 - 2 %    Lymphocytes % 25 14 - 44 %    Monocytes % 8 4 - 12 %    Eosinophils Relative 4 0 - 6 %    Basophils Relative 1 0 - 1 %    Absolute Neutrophils 4.88 1.85 - 7.62 Thousands/µL    Absolute Immature Grans 0.05 0.00 - 0.20 Thousand/uL    Absolute Lymphocytes 1.97 0.60 - 4.47 Thousands/µL    Absolute Monocytes 0.64 0.17 - 1.22 Thousand/µL    Eosinophils Absolute 0.28 0.00 - 0.61 Thousand/µL    Basophils Absolute 0.04 0.00 - 0.10 Thousands/µL   ECG 12 lead    Collection Time: 08/13/24 12:28 PM   Result Value Ref Range    Ventricular Rate 144 BPM    Atrial Rate 326 BPM    TN Interval  ms    QRSD Interval 66 ms    QT Interval 306 ms    QTC Interval 473 ms    P Axis  degrees    QRS Axis 28 degrees    T Wave Axis -6 degrees   HS Troponin 0hr (reflex protocol)    Collection Time: 08/13/24 12:55  "PM   Result Value Ref Range    hs TnI 0hr 31 \"Refer to ACS Flowchart\"- see link ng/L   ]      * Please Note: Fluency DirectDictation voice to text software may have been used in the creation of this document. **  "

## 2024-08-13 NOTE — ASSESSMENT & PLAN NOTE
Vit D, 25-hydroxy level 20.1 ng/ml on 8/12/2024  Start vitamin D supplements  Should receive calcium supplements as well  Monitor Vitamin D, 25-hydroxy level

## 2024-08-13 NOTE — ASSESSMENT & PLAN NOTE
Patient's pulse has been fluctuating and noted to reach 169 bpm.   ECG was ordered and shows atrial flutter with variable AV block.   Nonspecific ST and T wave abnormality   When compared with previous ECG on 8/11, vent. rate has increased by 48 BPM  Transfer to telemetry.  Cardiology consulted, appreciate input  PT asymptomatic.   Resolved with administration of IV Lopressor.   Currently maintaining NSR with heart rates in the 60s.   HR low today at 55 bpm.   Toprol-XL 25 mg daily started.   Monitor closely

## 2024-08-13 NOTE — TREATMENT PLAN
Pt with acute vs subacute T7 fx and age indeterminate T9 fx   Pt reported severe thoracic back pain s/p fall and noted to have tenderness on exam.     Imaging reviewed personally. Final results as below  CT CAP 8/11/24: Moderate T7 superior plate compression fracture that could be acute or possibly subacute. Minimal bone anteropulsion without significant canal stenosis. Age-indeterminate mild superior plate compression fracture at T9.  Xray thoracic spine 8/11/24: Unchanged T7 and T9 compression fractures. Mild stable kyphotic alignment.     Plan    Continue regular neurologic checks.  Pain control per primary team.  Eval and mobilize per PT/OT when able to.   DVT PPX:SCDs   Spine precautions. Pt should have safety precautions to avoid further injury to their back.   TLSO brace to be worn PRN for comfort as tolerated.   At this time, no nsx intervention is anticipated. Recommend conservative management with pain control and PT as needed.  Neurosurgery will see pt PRN during the remainder of this hospitalization. Further follow up on a PRN basis should symptoms persist or worsen. Please call with any questions or concerns.

## 2024-08-13 NOTE — DISCHARGE INSTR - DIET
Regular diet w/ thin liquids  Meds as tolerated   VBS completed 8/13/24: oral and pharyngeal swallowing skills WFL

## 2024-08-13 NOTE — PROCEDURES
Video Swallow Study      Patient Name: Oliva Dc  Today's Date: 8/13/2024        Past Medical History  History reviewed. No pertinent past medical history.     Past Surgical History  History reviewed. No pertinent surgical history.      General Information:   89 yo female referred for a VBS by SILVIA Overton for dysphagia; pt c/o difficulty swallowing w/ foods more than liquids; substernal pain after swallowing, and expectorating clear mucous.     Cognition:  WFL    Speech/Swallow Mech: Oral motor movements appeared  WNL; Dentition was  natural; Cough was adequate . Respiratory Status: NC;   Current diet: regular diet w/ thin liquids.  Prior VBS none     Pt was seen in radiology for a Video Barium Swallow Study, seated in the upright position and viewed laterally with the following consistencies: puree, solids, nectar thick liquids, thin liquids, barium pill w/ water by cup.       Results are as follows:     **Images are available for review on PACS        Oral Stage:   Pt was able to bite cookie, drink liquids from cup and straw with good oral control. Prolonged, excessive manipulation with puree; effective mastication with solids. Adequate bolus transfer.              Pharyngeal Stage:   Swallow initiation was delayed with complete laryngeal excursion, epiglottic inversion and tongue base retraction. Trace- transient penetration noted with thin liquids by straw; no aspiration observed during study. No pharyngeal retention noted.                   Esophageal Stage:   Briefly assessed; pt c/o substernal chest pain after each swallow during the study. Delayed clearance noted from the distal esophagus.        Assessment Summary:   Oral and pharyngeal swallowing skills appeared WFL- no pharyngeal retention, laryngeal penetration or aspiration.   Delayed clearance from the distal esophagus noted w/ pill stasis at the LES.               Recommendations:   Cont Regular diet w/  thin liquids  Meds as tolerated  Consider GI consult  No further speech tx follow up needed      Emani Hope MA CCC-SLP  Speech Pathologist  [place] license # [id number]  [place] license # [id number]  Available via Secure Chat

## 2024-08-13 NOTE — PLAN OF CARE
Problem: Potential for Falls  Goal: Patient will remain free of falls  Description: INTERVENTIONS:  - Educate patient/family on patient safety including physical limitations  - Instruct patient to call for assistance with activity   - Consult OT/PT to assist with strengthening/mobility   - Keep Call bell within reach  - Keep bed low and locked with side rails adjusted as appropriate  - Keep care items and personal belongings within reach  - Initiate and maintain comfort rounds  - Make Fall Risk Sign visible to staff  - Offer Toileting every  Hours, in advance of need  - Initiate/Maintain alarm  - Obtain necessary fall risk management equipment:   - Apply yellow socks and bracelet for high fall risk patients  - Consider moving patient to room near nurses station  Outcome: Progressing     Problem: PAIN - ADULT  Goal: Verbalizes/displays adequate comfort level or baseline comfort level  Description: Interventions:  - Encourage patient to monitor pain and request assistance  - Assess pain using appropriate pain scale  - Administer analgesics based on type and severity of pain and evaluate response  - Implement non-pharmacological measures as appropriate and evaluate response  - Consider cultural and social influences on pain and pain management  - Notify physician/advanced practitioner if interventions unsuccessful or patient reports new pain  Outcome: Progressing     Problem: INFECTION - ADULT  Goal: Absence or prevention of progression during hospitalization  Description: INTERVENTIONS:  - Assess and monitor for signs and symptoms of infection  - Monitor lab/diagnostic results  - Monitor all insertion sites, i.e. indwelling lines, tubes, and drains  - Monitor endotracheal if appropriate and nasal secretions for changes in amount and color  - [place] appropriate cooling/warming therapies per order  - Administer medications as ordered  - Instruct and encourage patient and family to use good hand hygiene technique  -  Identify and instruct in appropriate isolation precautions for identified infection/condition  Outcome: Progressing  Goal: Absence of fever/infection during neutropenic period  Description: INTERVENTIONS:  - Monitor WBC    Outcome: Progressing     Problem: SAFETY ADULT  Goal: Patient will remain free of falls  Description: INTERVENTIONS:  - Educate patient/family on patient safety including physical limitations  - Instruct patient to call for assistance with activity   - Consult OT/PT to assist with strengthening/mobility   - Keep Call bell within reach  - Keep bed low and locked with side rails adjusted as appropriate  - Keep care items and personal belongings within reach  - Initiate and maintain comfort rounds  - Make Fall Risk Sign visible to staff  - Offer Toileting every  Hours, in advance of need  - Initiate/Maintain alarm  - Obtain necessary fall risk management equipment:   - Apply yellow socks and bracelet for high fall risk patients  - Consider moving patient to room near nurses station  Outcome: Progressing  Goal: Maintain or return to baseline ADL function  Description: INTERVENTIONS:  -  Assess patient's ability to carry out ADLs; assess patient's baseline for ADL function and identify physical deficits which impact ability to perform ADLs (bathing, care of mouth/teeth, toileting, grooming, dressing, etc.)  - Assess/evaluate cause of self-care deficits   - Assess range of motion  - Assess patient's mobility; develop plan if impaired  - Assess patient's need for assistive devices and provide as appropriate  - Encourage maximum independence but intervene and supervise when necessary  - Involve family in performance of ADLs  - Assess for home care needs following discharge   - Consider OT consult to assist with ADL evaluation and planning for discharge  - Provide patient education as appropriate  Outcome: Progressing  Goal: Maintains/Returns to pre admission functional level  Description:  INTERVENTIONS:  - Perform AM-PAC 6 Click Basic Mobility/ Daily Activity assessment daily.  - Set and communicate daily mobility goal to care team and patient/family/caregiver.   - Collaborate with rehabilitation services on mobility goals if consulted  - Perform Range of Motion  times a day.  - Reposition patient every  hours.  - Dangle patient  times a day  - Stand patient  times a day  - Ambulate patient  times a day  - Out of bed to chair  times a day   - Out of bed for meals times a day  - Out of bed for toileting  - Record patient progress and toleration of activity level   Outcome: Progressing     Problem: DISCHARGE PLANNING  Goal: Discharge to home or other facility with appropriate resources  Description: INTERVENTIONS:  - Identify barriers to discharge w/patient and caregiver  - Arrange for needed discharge resources and transportation as appropriate  - Identify discharge learning needs (meds, wound care, etc.)  - Arrange for interpretive services to assist at discharge as needed  - Refer to Case Management Department for coordinating discharge planning if the patient needs post-hospital services based on physician/advanced practitioner order or complex needs related to functional status, cognitive ability, or social support system  Outcome: Progressing     Problem: Knowledge Deficit  Goal: Patient/family/caregiver demonstrates understanding of disease process, treatment plan, medications, and discharge instructions  Description: Complete learning assessment and assess knowledge base.  Interventions:  - Provide teaching at level of understanding  - Provide teaching via preferred learning methods  Outcome: Progressing     Problem: Prexisting or High Potential for Compromised Skin Integrity  Goal: Skin integrity is maintained or improved  Description: INTERVENTIONS:  - Identify patients at risk for skin breakdown  - Assess and monitor skin integrity  - Assess and monitor nutrition and hydration status  -  Monitor labs   - Assess for incontinence   - Turn and reposition patient  - Assist with mobility/ambulation  - Relieve pressure over bony prominences  - Avoid friction and shearing  - Provide appropriate hygiene as needed including keeping skin clean and dry  - Evaluate need for skin moisturizer/barrier cream  - Collaborate with interdisciplinary team   - Patient/family teaching  - Consider wound care consult   Outcome: Progressing     Problem: Nutrition/Hydration-ADULT  Goal: Nutrient/Hydration intake appropriate for improving, restoring or maintaining nutritional needs  Description: Monitor and assess patient's nutrition/hydration status for malnutrition. Collaborate with interdisciplinary team and initiate plan and interventions as ordered.  Monitor patient's weight and dietary intake as ordered or per policy. Utilize nutrition screening tool and intervene as necessary. Determine patient's food preferences and provide high-protein, high-caloric foods as appropriate.     INTERVENTIONS:  - Monitor oral intake, urinary output, labs, and treatment plans  - Assess nutrition and hydration status and recommend course of action  - Evaluate amount of meals eaten  - Assist patient with eating if necessary   - Allow adequate time for meals  - Recommend/ encourage appropriate diets, oral nutritional supplements, and vitamin/mineral supplements  - Order, calculate, and assess calorie counts as needed  - Recommend, monitor, and adjust tube feedings and TPN/PPN based on assessed needs  - Assess need for intravenous fluids  - Provide specific nutrition/hydration education as appropriate  - Include patient/family/caregiver in decisions related to nutrition  Outcome: Progressing

## 2024-08-13 NOTE — PROGRESS NOTES
[facility]  Progress Note  Name: Oliva Dc I  MRN: [id number]  Unit/Bed#: W -01 I Date of Admission: 8/11/2024   Date of Service: 8/13/2024 I Hospital Day: 2    Assessment & Plan   SIRS (systemic inflammatory response syndrome) (HCC)  Assessment & Plan  - SIRS present on admission  - Tachycardia, leukocytosis, lactic acidosis, TAMEKA present on admission  - Resolved with IV fluids and antibiotics for UTI  - Blood cultures no growth at 24 hrs      Acute UTI (urinary tract infection)  Assessment & Plan  - UA present on admission  - Continue Rocephin x 3 doses    Multiple falls  Assessment & Plan  - Status post multiple falls with the below noted injuries.  - Fall precautions.  - Geriatric Medicine consultation for evaluation, medication review and recommendations.  - PT and OT evaluation and treatment as indicated.  - Case Management consultation for disposition planning.      * Closed fracture of ninth thoracic vertebra (HCC)  Assessment & Plan  - T9 fracture  - Appreciate neurosurgery consult and recommendations  - TLSO brace and uprights  - Neurovascularly intact  - Pain control  - PT and OT             Bowel Regimen: Miralax, Senokot  VTE Prophylaxis:Sequential compression device (Venodyne)  and Heparin     Disposition: Anticipate discharge in next 24-48 hours    Subjective   Chief Complaint: I feel weak    Subjective: patient states she is doing okay, but feels globally weak.  Patient is requesting to talk with hospice team, and that she wishes to discuss hospice with her daughter.  Patient denies any new pain, headache, chest pain, difficulty breathing, abd pain, n/v/d, change in bowel habits or change in urination, or any other acute complaint at this time.     Objective   Vitals:   Temp:  [97 °F (36.1 °C)-97.8 °F (36.6 °C)] 97.6 °F (36.4 °C)  HR:  [] 131  Resp:  [15-18] 18  BP: (118-170)/(62-77) 118/70    I/O         08/11 0701 08/12 0700 08/12 0701 08/13 0700 08/13  0701  08/14 0700    P.O. 120      I.V. (mL/kg)  1024.2 (21.8)     IV Piggyback 550      Total Intake(mL/kg) 670 (14.3) 1024.2 (21.8)     Urine (mL/kg/hr) 200      Total Output 200      Net +470 +1024.2            Unmeasured Urine Occurrence  2 x              Physical Exam:   GENERAL APPEARANCE: No acute distress, well appearing  NEURO: GCS 15  HEENT: Normocephalic, atraumatic  CV: RRR  LUNGS: CTA BL  GI: abd soft, NTTP, no guarding, no rebound  : voiding independently  MSK: strength 4/5 globally, neurovascularly intact  SKIN: warm, dry, intact    Invasive Devices       Peripheral Intravenous Line  Duration             Peripheral IV 08/11/24 Left;Ventral (anterior) Forearm 2 days    Peripheral IV 08/11/24 Right;Ventral (anterior) Forearm 2 days                          Lab Results: Results: I have personally reviewed all pertinent laboratory/tests results, BMP/CMP:   Lab Results   Component Value Date    SODIUM 139 08/13/2024    K 3.7 08/13/2024     08/13/2024    CO2 26 08/13/2024    BUN 24 08/13/2024    CREATININE 0.96 08/13/2024    CALCIUM 8.4 08/13/2024    EGFR 48 08/13/2024   , and CBC:   Lab Results   Component Value Date    WBC 7.86 08/13/2024    HGB 11.6 08/13/2024    HCT 37.0 08/13/2024     (H) 08/13/2024     08/13/2024    RBC 3.68 (L) 08/13/2024    MCH 31.5 08/13/2024    MCHC 31.4 08/13/2024    RDW 13.5 08/13/2024    MPV 10.1 08/13/2024    NRBC 0 08/13/2024     Imaging: I have personally reviewed pertinent reports.     Other Studies: N/A

## 2024-08-14 PROBLEM — N39.0 ACUTE UTI (URINARY TRACT INFECTION): Status: RESOLVED | Noted: 2024-08-12 | Resolved: 2024-08-14

## 2024-08-14 PROBLEM — N17.9 ACUTE KIDNEY INJURY (HCC): Status: RESOLVED | Noted: 2024-08-12 | Resolved: 2024-08-14

## 2024-08-14 LAB
ATRIAL RATE: 326 BPM
QRS AXIS: 28 DEGREES
QRSD INTERVAL: 66 MS
QT INTERVAL: 306 MS
QTC INTERVAL: 473 MS
T WAVE AXIS: -6 DEGREES
VENTRICULAR RATE: 144 BPM

## 2024-08-14 PROCEDURE — 93010 ELECTROCARDIOGRAM REPORT: CPT | Performed by: INTERNAL MEDICINE

## 2024-08-14 PROCEDURE — 99231 SBSQ HOSP IP/OBS SF/LOW 25: CPT | Performed by: SURGERY

## 2024-08-14 PROCEDURE — 99232 SBSQ HOSP IP/OBS MODERATE 35: CPT | Performed by: INTERNAL MEDICINE

## 2024-08-14 PROCEDURE — 99232 SBSQ HOSP IP/OBS MODERATE 35: CPT | Performed by: FAMILY MEDICINE

## 2024-08-14 RX ADMIN — NYSTATIN: 100000 POWDER TOPICAL at 16:19

## 2024-08-14 RX ADMIN — ACETAMINOPHEN 975 MG: 325 TABLET, FILM COATED ORAL at 21:00

## 2024-08-14 RX ADMIN — HEPARIN SODIUM 5000 UNITS: 5000 INJECTION INTRAVENOUS; SUBCUTANEOUS at 21:00

## 2024-08-14 RX ADMIN — MIRTAZAPINE 15 MG: 15 TABLET, FILM COATED ORAL at 21:00

## 2024-08-14 RX ADMIN — DONEPEZIL HYDROCHLORIDE 10 MG: 5 TABLET ORAL at 21:00

## 2024-08-14 RX ADMIN — METOPROLOL SUCCINATE 25 MG: 25 TABLET, EXTENDED RELEASE ORAL at 08:48

## 2024-08-14 RX ADMIN — NYSTATIN 1 APPLICATION: 100000 POWDER TOPICAL at 08:49

## 2024-08-14 RX ADMIN — AMLODIPINE BESYLATE 2.5 MG: 2.5 TABLET ORAL at 08:48

## 2024-08-14 RX ADMIN — PANTOPRAZOLE SODIUM 40 MG: 40 TABLET, DELAYED RELEASE ORAL at 05:12

## 2024-08-14 RX ADMIN — HEPARIN SODIUM 5000 UNITS: 5000 INJECTION INTRAVENOUS; SUBCUTANEOUS at 13:55

## 2024-08-14 RX ADMIN — GABAPENTIN 100 MG: 100 CAPSULE ORAL at 21:00

## 2024-08-14 RX ADMIN — SERTRALINE HYDROCHLORIDE 50 MG: 50 TABLET ORAL at 08:48

## 2024-08-14 RX ADMIN — ACETAMINOPHEN 975 MG: 325 TABLET, FILM COATED ORAL at 05:12

## 2024-08-14 RX ADMIN — HEPARIN SODIUM 5000 UNITS: 5000 INJECTION INTRAVENOUS; SUBCUTANEOUS at 05:12

## 2024-08-14 NOTE — PROGRESS NOTES
Progress Note - Geriatric Medicine   Oliva MARYJANE Dc 89 y.o. female MRN: [id number]  Unit/Bed#: S -01 Encounter: [id number]      Assessment/Plan:  Atrial flutter (HCC)  Assessment & Plan  Patient's pulse has been fluctuating and noted to reach 169 bpm.   ECG was ordered and shows atrial flutter with variable AV block.   Nonspecific ST and T wave abnormality   When compared with previous ECG on 8/11, vent. rate has increased by 48 BPM  Transfer to telemetry.  Cardiology consulted, appreciate input  PT asymptomatic.   Resolved with administration of IV Lopressor.   Currently maintaining NSR with heart rates in the 60s.   Start Toprol-XL 25 mg daily   Monitor closely          Vitamin D insufficiency  Assessment & Plan  Vit D, 25-hydroxy level 20.1 ng/ml on 8/12/2024  Start vitamin D supplements  Should receive calcium supplements as well  Monitor Vitamin D, 25-hydroxy level    Dysphagia  Assessment & Plan  Patient complains of difficult and painful swallowing  Reports dry cough every time she eats  Patient complains of chronic excessive mucous production and expectoration prior to admission  Recommend trial of scopolamine transdermal patch or glycopyrrolate (Robinul)   Patient complains of right flank pain while eating and after swallowing  Noted to have hypersalivation during the encounter  Speech therapy follows appreciate input  Diet: regular diet and thin liquids   Meds: whole with liquid   Frequent Oral care: 2x/day  Aspiration/Reflux precautions   Other Recommendations/ considerations: consider VBS/barium swallow for formal imaging of swallowing vs GI consult    Hiatal hernia  Assessment & Plan  CT chest abdomen pelvis on 8/11/2024 shows small hiatal hernia  Patient complains of loss of appetite, dysphagia, and cough with increased mucus production  She states that she was taking Robitussin 100 mg/5 ml, 200 mg TID for her cough, but is no longer taking this  Patient currently taking pantoprazole 40 mg  daily inpatient  Continue current regimen per primary team   Speech pathology consulted, appreciate input    At risk for delirium  Assessment & Plan  -Patient is high risk of delirium due to pain, change in environment, urinary tract infection  -Initiate delirium precautions  -maintain normal sleep/wake cycle  -minimize overnight interruptions, group overnight vitals/labs/nursing checks as possible  -dim lights, close blinds and turn off tv to minimize stimulation and encourage sleep environment in evenings  -ensure that pain is well controlled   -monitor for fecal and urinary retention which may precipitate delirium  -encourage early mobilization and ambulation  -provide frequent reorientation and redirection  -encourage family and friends at the bedside to help calm patient if anxious  -avoid medications which may precipitate or worsen delirium such as tramadol, benzodiazepine, anticholinergics, and antihistaminics  -encourage hydration and nutrition , assist with feeding if needed  -redirect unwanted behaviors as first line, avoid physical restraints.     Chronic diarrhea  Assessment & Plan  Patient with history of chronic diarrhea.   Provide banatrol as needed for diarrhea  Monitor for dehydration and monitor electrolytes    Hypertension  Assessment & Plan  /56 today.  Patient takes amlodipine 2.5 mg daily and furosemide 20 mg daily at home.  Continue to monitor vitals daily.  Avoid hypotension  Advised about low salt diet and exercise.     Dementia without behavioral disturbance (HCC)  Assessment & Plan  Pt. Is AAOx3 at baseline   Currently AAOx3  Patient scored a 2/5 on mini cog  Pt. Lives at home with daughter   Mostly dependent for instrumental ADLs; patient reports that she cleans the house, vacuums, makes her bed, and takes out the garbage.  Mostly independent for ADLs; requires minimal/occasional assistance with bathing and dressing  Normal behaviors  Was taking donepezil 10 mg daily prior to  admission  Consider discontinuing donepezil as it is contraindicated in patients <50 kg; however, patient feels as though it has really helped with her memory. Monitor closely.   TSH 3.59 on 6/24/2024  B12 251 on 8/12/2024  Recommend B12 supplementation 1000 mcg daily  CT shows stable diminished attenuation in the periventricular and subcortical matter likely due to moderate chronic microangiopathy.  [place] to person, place, time, and situation as needed  Monitor for behaviors   Monitor for mental status change  Provide supportive care     Ambulatory dysfunction  Assessment & Plan  Patient uses a cane at baseline.   History of falls.   Will continue PT/OT.  Placed on fall precautions.      Multiple falls  Assessment & Plan  Patient has a history of falls  Most recent fall was one month prior to this event   Per granddaughter, patient has had increased dizziness/light-headedness upon standing, leading to falls   Monitor orthostatic vitals  Fall precautions      * Closed fracture of ninth thoracic vertebra (HCC)  Assessment & Plan  Patient presented to the ED on 8/11 after a fall that occurred on 8/7  Patient complained of increasing thoracic back pain   CT abdomen pelvis demonstrates mild superior T9 compression deformity and severe chronic T7 compression deformity   Neurosurgery follows, appreciate input  Pain is currently controlled. Continue tylenol ATC, gabapentin 100 mg QHS  DVT prophylaxis with SCDs bilateral legs  Spine precautions  PT/OT as indicated   TLSO brace to be worn PRN for comfort as tolerated.   At this time, no nsx intervention is anticipated. Recommend conservative management with pain control and PT as needed.        Acute UTI (urinary tract infection)-resolved as of 8/14/2024  Assessment & Plan  Abnormal UA present on admission   Patient started on Rocephin 1000 mg x 3 doses   Monitor for signs of urinary retention   Continue current regimen per primary team       Subjective:   Oliva is a 89  y/o female being seen for geriatric medicine follow-up. Patient is AAOx3 today. She reports that she is doing well today. She slept well overnight. She enjoyed her breakfast. She had a bowel movement yesterday. Denies abdominal pain, nausea, vomiting, diarrhea, and constipation. Denies shortness of breath, dizziness, light-headedness, and headaches. Patient denies pain. Patient reports that her biggest complaint is excessive mucus production and dysphagia.     Review of Systems   Constitutional:  Positive for activity change. Negative for chills and fever.   HENT:  Positive for hearing loss and trouble swallowing. Negative for congestion and rhinorrhea.    Respiratory:  Positive for cough. Negative for shortness of breath and wheezing.    Cardiovascular:  Negative for chest pain, palpitations and leg swelling.   Gastrointestinal:  Negative for abdominal pain and constipation.   Endocrine: Negative for cold intolerance.   Genitourinary:  Negative for difficulty urinating, dysuria and hematuria.   Musculoskeletal:  Positive for gait problem.   Skin:  Negative for wound.   Allergic/Immunologic: Negative for environmental allergies.   Neurological:  Positive for weakness. Negative for dizziness and seizures.   Hematological:  Does not bruise/bleed easily.   Psychiatric/Behavioral:  Negative for behavioral problems and sleep disturbance.          Objective:     Vitals: Blood pressure 151/60, pulse 65, temperature 97.7 °F (36.5 °C), resp. rate 16, height 5' (1.524 m), weight 46.9 kg (103 lb 6.3 oz), SpO2 91%.,Body mass index is 20.19 kg/m².      Intake/Output Summary (Last 24 hours) at 8/14/2024 1602  Last data filed at 8/14/2024 1056  Gross per 24 hour   Intake 0 ml   Output 200 ml   Net -200 ml       Current Medications: Reviewed    Physical Exam:   Physical Exam  Vitals and nursing note reviewed.   Constitutional:       General: She is not in acute distress.     Appearance: She is well-developed.   HENT:      Head:  Normocephalic and atraumatic.      Right Ear: External ear normal.      Left Ear: External ear normal.   Eyes:      Conjunctiva/sclera: Conjunctivae normal.   Cardiovascular:      Rate and Rhythm: Normal rate and regular rhythm.      Heart sounds: No murmur heard.  Pulmonary:      Effort: Pulmonary effort is normal. No respiratory distress.      Breath sounds: Normal breath sounds.   Abdominal:      Palpations: Abdomen is soft.      Tenderness: There is no abdominal tenderness.   Musculoskeletal:         General: No swelling.      Cervical back: Neck supple.   Skin:     General: Skin is warm and dry.      Capillary Refill: Capillary refill takes less than 2 seconds.   Neurological:      Mental Status: She is alert and oriented to person, place, and time. Mental status is at baseline.      Motor: Weakness present.      Gait: Gait abnormal.   Psychiatric:         Mood and Affect: Mood normal.          Invasive Devices       Peripheral Intravenous Line  Duration             Peripheral IV 08/11/24 Left;Ventral (anterior) Forearm 3 days    Peripheral IV 08/11/24 Right;Ventral (anterior) Forearm 3 days                    Lab, Imaging and other studies: I have personally reviewed pertinent reports.

## 2024-08-14 NOTE — PROGRESS NOTES
General Cardiology   Progress Note -  [facility] One   Oliva MARYJANE Dc 89 y.o. female MRN: [id number]    Unit/Bed#: S -01 Encounter: [id number]    Assessment  1.  New onset atrial flutter with RVR  -Asymptomatic.  No complaints of CP or palpitations.  -ECG 8/11 (admission) -NSR with PACs.  -ECG 8/13 - atrial flutter, variable AVB, rate 144 bpm.  -Telemetry review -NSR, heart rates currently in the 60s.  -Not previously or currently on AV chi blocking agents. Started on metoprolol succinate 25 mg daily.   -CHADS2 Vascor = 4, anticoagulation deferred at this time.   -TSH level 3.59.  2. Hypertension  -BP last recorded at 137/57.  -Outpatient BP regimen; amlodipine 2.5 mg daily  -Inpatient BP regimen; amlodipine 2.5 mg daily.  3. Traumatic falls w/ acute/subacute T7/T9 fracture.  -Management per the trauma/neurosurgery team.  -Preceding symptoms of dizziness with subsequent gait instability and fall.  Possibly secondary to orthostasis in the context of dehydration/notable TAMEKA in the presence of poor oral intake due to difficulty swallowing.     Plan  -Pt denies any specific cardiac or respiratory complaints at the time.  -Currently maintaining NSR w/HRs in the 60s.   -Continue metoprolol succinate 25 mg daily.   -We discussed risks/benefits of systemic AC initiation.  Tachyarrhythmia possibly precipitated by acute traumatic injury/pain response, dehydration/TAMEKA and UTI.  Isolated episode of atrial flutter, no prior documented history of and no recurrence since. Would favor holding off on systemic AC initiation for now.  Continue to monitor on telemetry while hospitalized.  If recurrence we can revisit this topic.  Higher risk of a major bleeding event given her advanced age/history of dementia and propensity for falls.  -Obtain orthostatic blood pressures if able.     Subjective  Review of Systems   Constitutional: Negative for chills, fever and malaise/fatigue.   Eyes:  Negative for visual disturbance.    Cardiovascular:  Negative for chest pain, dyspnea on exertion, leg swelling, near-syncope, orthopnea and palpitations.   Respiratory:  Negative for cough and shortness of breath.    Gastrointestinal:  Negative for abdominal pain.   Neurological:  Negative for dizziness, headaches and weakness.       Objective:   Physical Exam  Vitals and nursing note reviewed.   Constitutional:       General: She is not in acute distress.     Appearance: She is not diaphoretic.   HENT:      Head: Normocephalic and atraumatic.   Eyes:      General: No scleral icterus.  Cardiovascular:      Rate and Rhythm: Normal rate and regular rhythm.      Pulses: Normal pulses.      Heart sounds: Normal heart sounds.   Pulmonary:      Effort: Pulmonary effort is normal.      Breath sounds: Normal breath sounds. No wheezing or rales.   Abdominal:      Palpations: Abdomen is soft.      Tenderness: There is no abdominal tenderness.   Musculoskeletal:      Right lower leg: No edema.      Left lower leg: No edema.   Skin:     General: Skin is warm and dry.      Capillary Refill: Capillary refill takes less than 2 seconds.   Neurological:      General: No focal deficit present.      Mental Status: She is alert and oriented to person, place, and time.   Psychiatric:         Mood and Affect: Mood normal.         Vitals: Blood pressure 132/53, pulse (!) 53, temperature 97.8 °F (36.6 °C), resp. rate 15, height 5' (1.524 m), weight 46.9 kg (103 lb 6.3 oz), SpO2 92%.,     Body mass index is 20.19 kg/m².,   Systolic (24hrs), Av , Min:123 , Max:158     Diastolic (24hrs), Av, Min:53, Max:73      Intake/Output Summary (Last 24 hours) at 2024 1122  Last data filed at 2024 0945  Gross per 24 hour   Intake 0 ml   Output --   Net 0 ml     Weight (last 2 days)       None            LABORATORY RESULTS  Results from last 7 days   Lab Units 24  0907   CK TOTAL U/L 50     CBC with diff:   Results from last 7 days   Lab Units 24  0637  "08/12/24  0632 08/11/24  0907   WBC Thousand/uL 7.86 11.05* 14.60*   HEMOGLOBIN g/dL 11.6 11.2* 13.6   HEMATOCRIT % 37.0 34.9 42.5   MCV fL 101* 100* 100*   PLATELETS Thousands/uL 239 251 299   RBC Million/uL 3.68* 3.49* 4.27   MCH pg 31.5 32.1 31.9   MCHC g/dL 31.4 32.1 32.0   RDW % 13.5 13.7 13.7   MPV fL 10.1 10.3 10.3   NRBC AUTO /100 WBCs 0 0 0       CMP:  Results from last 7 days   Lab Units 08/13/24  0637 08/12/24 0632 08/11/24  0907   POTASSIUM mmol/L 3.7 3.9 3.7   CHLORIDE mmol/L 107 108 105   CO2 mmol/L 26 25 23   BUN mg/dL 24 26* 29*   CREATININE mg/dL 0.96 0.96 1.15   CALCIUM mg/dL 8.4 8.4 9.4   AST U/L  --   --  19   ALT U/L  --   --  19   ALK PHOS U/L  --   --  62   EGFR ml/min/1.73sq m 48 48 39       BMP:  Results from last 7 days   Lab Units 08/13/24 0637 08/12/24 0632 08/11/24  0907   POTASSIUM mmol/L 3.7 3.9 3.7   CHLORIDE mmol/L 107 108 105   CO2 mmol/L 26 25 23   BUN mg/dL 24 26* 29*   CREATININE mg/dL 0.96 0.96 1.15   CALCIUM mg/dL 8.4 8.4 9.4       No results found for: \"NTBNP\"     Results from last 7 days   Lab Units 08/12/24  0632   MAGNESIUM mg/dL 1.9                   Results from last 7 days   Lab Units 08/11/24  0907   INR  0.99       Lipid Profile:   No results found for: \"CHOL\"  No results found for: \"HDL\"  No results found for: \"LDLCALC\"  No results found for: \"TRIG\"    Cardiac testing:   No results found for this or any previous visit.    No results found for this or any previous visit.    No results found for this or any previous visit.    No valid procedures specified.  No results found for this or any previous visit.      Meds/Allergies   all current active meds have been reviewed and current meds:   Current Facility-Administered Medications   Medication Dose Route Frequency    acetaminophen (TYLENOL) tablet 975 mg  975 mg Oral Q8H ROBERTO    aluminum-magnesium hydroxide-simethicone (MAALOX) oral suspension 30 mL  30 mL Oral Q4H PRN    amLODIPine (NORVASC) tablet 2.5 mg  2.5 mg Oral " Daily    donepezil (ARICEPT) tablet 10 mg  10 mg Oral HS    gabapentin (NEURONTIN) capsule 100 mg  100 mg Oral HS    heparin (porcine) subcutaneous injection 5,000 Units  5,000 Units Subcutaneous Q8H ROBERTO    lidocaine (LIDODERM) 5 % patch 1 patch  1 patch Topical Daily    methocarbamol (ROBAXIN) tablet 250 mg  250 mg Oral Q6H PRN    metoprolol succinate (TOPROL-XL) 24 hr tablet 25 mg  25 mg Oral Daily    mirtazapine (REMERON) tablet 15 mg  15 mg Oral HS    naloxone (NARCAN) 0.04 mg/mL syringe 0.04 mg  0.04 mg Intravenous Q1MIN PRN    nystatin (MYCOSTATIN) powder   Topical BID    ondansetron (ZOFRAN) injection 4 mg  4 mg Intravenous Q6H PRN    oxyCODONE (ROXICODONE) split tablet 2.5 mg  2.5 mg Oral Q4H PRN    Or    oxyCODONE (ROXICODONE) IR tablet 5 mg  5 mg Oral Q4H PRN    pantoprazole (PROTONIX) EC tablet 40 mg  40 mg Oral Early Morning    polyethylene glycol (MIRALAX) packet 17 g  17 g Oral Daily    senna-docusate sodium (SENOKOT S) 8.6-50 mg per tablet 1 tablet  1 tablet Oral HS    sertraline (ZOLOFT) tablet 50 mg  50 mg Oral Daily            Counseling / Coordination of Care  Total floor / unit time spent today 20 minutes.  Greater than 50% of total time was spent with the patient and / or family counseling and / or coordination of care.      ** Please Note: Dragon 360 Dictation voice to text software may have been used in the creation of this document. **

## 2024-08-14 NOTE — ASSESSMENT & PLAN NOTE
Patient's pulse has been fluctuating today and noted to reach 169 bpm.   ECG was ordered and shows atrial flutter with variable AV block.   Nonspecific ST and T wave abnormality   When compared with previous ECG on 8/11, vent. rate has increased by 48 BPM  Transfer to telemetry.  Cardiology consulted, recommend Toprol-XL 25 mg daily  Monitor closely

## 2024-08-14 NOTE — DISCHARGE INSTRUCTIONS
Please follow up with your primary care provider concerning your hospital visit.  Please return to the Emergency Department for any other concerns.

## 2024-08-14 NOTE — PLAN OF CARE
Problem: PAIN - ADULT  Goal: Verbalizes/displays adequate comfort level or baseline comfort level  Description: Interventions:  - Encourage patient to monitor pain and request assistance  - Assess pain using appropriate pain scale  - Administer analgesics based on type and severity of pain and evaluate response  - Implement non-pharmacological measures as appropriate and evaluate response  - Consider cultural and social influences on pain and pain management  - Notify physician/advanced practitioner if interventions unsuccessful or patient reports new pain  Outcome: Progressing     Problem: INFECTION - ADULT  Goal: Absence or prevention of progression during hospitalization  Description: INTERVENTIONS:  - Assess and monitor for signs and symptoms of infection  - Monitor lab/diagnostic results  - Monitor all insertion sites, i.e. indwelling lines, tubes, and drains  - Monitor endotracheal if appropriate and nasal secretions for changes in amount and color  - [place] appropriate cooling/warming therapies per order  - Administer medications as ordered  - Instruct and encourage patient and family to use good hand hygiene technique  - Identify and instruct in appropriate isolation precautions for identified infection/condition  Outcome: Progressing  Goal: Absence of fever/infection during neutropenic period  Description: INTERVENTIONS:  - Monitor WBC    Outcome: Progressing

## 2024-08-14 NOTE — PROGRESS NOTES
[facility]  Progress Note  Name: Oliva Dc I  MRN: [id number]  Unit/Bed#: S -01 I Date of Admission: 8/11/2024   Date of Service: 8/14/2024 I Hospital Day: 3    Assessment & Plan   SIRS (systemic inflammatory response syndrome) (HCC)  Assessment & Plan  - SIRS present on admission  - Tachycardia, leukocytosis, lactic acidosis, TAMEKA present on admission  - Resolved with IV fluids and antibiotics for UTI  - Blood cultures no growth at 24 hrs      Acute UTI (urinary tract infection)  Assessment & Plan  - UA present on admission  - Continue Rocephin x 3 doses    Multiple falls  Assessment & Plan  - Status post multiple falls with the below noted injuries.  - Fall precautions.  - Geriatric Medicine consultation for evaluation, medication review and recommendations.  - PT and OT evaluation and treatment as indicated.  - Case Management consultation for disposition planning.      * Closed fracture of ninth thoracic vertebra (HCC)  Assessment & Plan  - T9 fracture  - Appreciate neurosurgery consult and recommendations  - TLSO brace and uprights  - Neurovascularly intact  - Pain control  - PT and OT             Bowel Regimen: Senokot, MiraLAX  VTE Prophylaxis:Heparin     Disposition: Anticipate discharge within next 24 to 48 hours    Subjective   Chief Complaint: I am doing okay    Subjective: Patient states that she is doing well, denies any palpitations, notes that she was able to sleep well throughout the night.  Patient is excited to get up and move and get out of the hospital.  Patient denies any headache, visual changes, numbness, tingling, weakness, chest pain, difficulty breathing, abdominal pain, nausea, vomiting, change in bowel habits, change in urination, or any other acute complaints at this time.     Objective   Vitals:   Temp:  [97.7 °F (36.5 °C)-98.4 °F (36.9 °C)] 97.7 °F (36.5 °C)  HR:  [66] 66  Resp:  [15] 15  BP: (123-158)/(55-73) 158/68    I/O         08/12  0701  08/13 0700 08/13 0701  08/14 0700 08/14 0701  08/15 0700    P.O.       I.V. (mL/kg) 1024.2 (21.8)      IV Piggyback       Total Intake(mL/kg) 1024.2 (21.8)      Urine (mL/kg/hr)       Total Output       Net +1024.2             Unmeasured Urine Occurrence 2 x      Unmeasured Stool Occurrence  1 x              Physical Exam:   GENERAL APPEARANCE: Well-appearing, no acute distress  NEURO: GCS 15  HEENT: Normocephalic, atraumatic  CV: Regular rate and rhythm  LUNGS: Lungs clear to auscultation bilaterally  GI: Soft, nontender palpation, no guarding, no rebound  : Voiding independently  MSK: Moving all extremities spontaneously, strength 5 out of 5, neurovascularly intact  SKIN: Warm, dry, intact      Invasive Devices       Peripheral Intravenous Line  Duration             Peripheral IV 08/11/24 Left;Ventral (anterior) Forearm 2 days    Peripheral IV 08/11/24 Right;Ventral (anterior) Forearm 2 days                          Lab Results: Results: I have personally reviewed all pertinent laboratory/tests results  Imaging: I have personally reviewed pertinent reports.     Other Studies:

## 2024-08-14 NOTE — CASE MANAGEMENT
Case Management Discharge Planning Note    Patient name Oliva Dc  [facility] /S -01 MRN [id number]  : 3/8/1924 Date 2024       Current Admission Date: 2024  Current Admission Diagnosis:Closed fracture of ninth thoracic vertebra (HCC)   Patient Active Problem List    Diagnosis Date Noted Date Diagnosed    Vitamin D insufficiency 2024     Atrial flutter (HCC) 2024     Multiple falls 2024     Ambulatory dysfunction 2024     Dementia without behavioral disturbance (HCC) 2024     Hypertension 2024     Chronic diarrhea 2024     At risk for delirium 2024     Hiatal hernia 2024     Dysphagia 2024     SIRS (systemic inflammatory response syndrome) (HCC) 2024     Closed fracture of ninth thoracic vertebra (HCC) 2024       LOS (days): 3  Geometric Mean LOS (GMLOS) (days): 2.9  Days to GMLOS:-0.3     OBJECTIVE:  Risk of Unplanned Readmission Score: 12.03         Current admission status: Inpatient   Preferred Pharmacy:   UNKNOWN - FOLLOW UP PRIOR TO DISCHARGE TO E-PRESCRIBE  No address on file      [facility] [place] [place] - [address]  [address] [facility] [address]  Phone: [phone] Fax: [phone]    Primary Care Provider: No primary care provider on file.    Primary Insurance: [employer] RE[employer]  Secondary Insurance:     DISCHARGE DETAILS:    Discharge planning discussed with:: granddaughter, Eulalia over phone  Freedom of Choice: Yes  Comments - Freedom of Choice: home with 24/7 assistance (from family), DME, [facility]  CM contacted family/caregiver?: Yes  Were Treatment Team discharge recommendations reviewed with patient/caregiver?: Yes  Did patient/caregiver verbalize understanding of patient care needs?: Yes  Were patient/caregiver advised of the risks associated with not following Treatment Team discharge recommendations?: Yes    Contacts  Patient Contacts: Eulalia  Relationship to  Patient:: Family  Contact Method: Phone  Phone Number: [phone]  Reason/Outcome: Continuity of Care, Emergency Contact, Referral, Discharge Planning    Requested Home Health Care         Is the patient interested in HHC at discharge?: Yes  Home Health Discipline requested:: Nursing, Physical Therapy, Occupational Therapy  Home Health Agency Name:: [facility]  Home Health Follow-Up Provider:: PCP  Home Health Services Needed:: Strengthening/Theraputic Exercises to Improve Function, Evaluate Functional Status and Safety, Gait/ADL Training  Homebound Criteria Met:: Uses an Assist Device (i.e. cane, walker, etc), Requires the Assistance of Another Person for Safe Ambulation or to Leave the Home  Supporting Clincal Findings:: Limited Endurance, Fatigues Easliy in Short Distances, Cognitive Deficit Requiring the Assistance of Others    Other Referral/Resources/Interventions Provided:  Interventions: HHC, DME  Referral Comments: CM met with patient at bedside to discuss dcp. Patient stating she still wishes to return home, but does state her daughter has her own health concerns, so she worries she cannot assist her, as needed. CM spoke with granddaughter, Eulalia re: dcp. Eulalia stating patient will have 24/7 care between herself, great-grandson and daughter (stating she is able to assist, despite patients concerns). Eulalia confirming they would like for patient arnold return home with HHC at d/c (did discuss option of [facility]; [facility] arranged)- pradip stating they will need a walker. DME ordered via [place], will follow up regarding delivery of BSC. CM to follow up as able to continue with dcp.    Would you like to participate in our [facility] service program?  : No - Declined    Treatment Team Recommendation: Home with Home Health Care (level II)  Discharge Destination Plan:: Home with Home Health Care  Transport at Discharge : Family

## 2024-08-14 NOTE — ASSESSMENT & PLAN NOTE
- SIRS present on admission  - Tachycardia, leukocytosis, lactic acidosis, TAMEKA present on admission  - Resolved with IV fluids and antibiotics for UTI  - Blood cultures no growth at 24 hrs

## 2024-08-15 VITALS
DIASTOLIC BLOOD PRESSURE: 57 MMHG | TEMPERATURE: 97.5 F | HEIGHT: 60 IN | SYSTOLIC BLOOD PRESSURE: 149 MMHG | HEART RATE: 54 BPM | BODY MASS INDEX: 20.3 KG/M2 | WEIGHT: 103.4 LBS | OXYGEN SATURATION: 94 % | RESPIRATION RATE: 16 BRPM

## 2024-08-15 LAB
ANION GAP SERPL CALCULATED.3IONS-SCNC: 7 MMOL/L (ref 4–13)
BASOPHILS # BLD AUTO: 0.05 THOUSANDS/ÂΜL (ref 0–0.1)
BASOPHILS NFR BLD AUTO: 1 % (ref 0–1)
BUN SERPL-MCNC: 23 MG/DL (ref 5–25)
CALCIUM SERPL-MCNC: 8.4 MG/DL (ref 8.4–10.2)
CHLORIDE SERPL-SCNC: 105 MMOL/L (ref 96–108)
CO2 SERPL-SCNC: 27 MMOL/L (ref 21–32)
CREAT SERPL-MCNC: 0.96 MG/DL (ref 0.6–1.3)
EOSINOPHIL # BLD AUTO: 0.37 THOUSAND/ÂΜL (ref 0–0.61)
EOSINOPHIL NFR BLD AUTO: 5 % (ref 0–6)
ERYTHROCYTE [DISTWIDTH] IN BLOOD BY AUTOMATED COUNT: 13.2 % (ref 11.6–15.1)
GFR SERPL CREATININE-BSD FRML MDRD: 48 ML/MIN/1.73SQ M
GLUCOSE SERPL-MCNC: 87 MG/DL (ref 65–140)
HCT VFR BLD AUTO: 36.8 % (ref 34.8–46.1)
HGB BLD-MCNC: 11.7 G/DL (ref 11.5–15.4)
IMM GRANULOCYTES # BLD AUTO: 0.05 THOUSAND/UL (ref 0–0.2)
IMM GRANULOCYTES NFR BLD AUTO: 1 % (ref 0–2)
LYMPHOCYTES # BLD AUTO: 1.99 THOUSANDS/ÂΜL (ref 0.6–4.47)
LYMPHOCYTES NFR BLD AUTO: 25 % (ref 14–44)
MAGNESIUM SERPL-MCNC: 2.3 MG/DL (ref 1.9–2.7)
MCH RBC QN AUTO: 32 PG (ref 26.8–34.3)
MCHC RBC AUTO-ENTMCNC: 31.8 G/DL (ref 31.4–37.4)
MCV RBC AUTO: 101 FL (ref 82–98)
MONOCYTES # BLD AUTO: 0.51 THOUSAND/ÂΜL (ref 0.17–1.22)
MONOCYTES NFR BLD AUTO: 7 % (ref 4–12)
NEUTROPHILS # BLD AUTO: 4.86 THOUSANDS/ÂΜL (ref 1.85–7.62)
NEUTS SEG NFR BLD AUTO: 61 % (ref 43–75)
NRBC BLD AUTO-RTO: 0 /100 WBCS
PHOSPHATE SERPL-MCNC: 3.2 MG/DL (ref 2.3–4.1)
PLATELET # BLD AUTO: 261 THOUSANDS/UL (ref 149–390)
PMV BLD AUTO: 10 FL (ref 8.9–12.7)
POTASSIUM SERPL-SCNC: 3.7 MMOL/L (ref 3.5–5.3)
RBC # BLD AUTO: 3.66 MILLION/UL (ref 3.81–5.12)
SODIUM SERPL-SCNC: 139 MMOL/L (ref 135–147)
WBC # BLD AUTO: 7.83 THOUSAND/UL (ref 4.31–10.16)

## 2024-08-15 PROCEDURE — 97535 SELF CARE MNGMENT TRAINING: CPT

## 2024-08-15 PROCEDURE — 84100 ASSAY OF PHOSPHORUS: CPT | Performed by: EMERGENCY MEDICINE

## 2024-08-15 PROCEDURE — NC001 PR NO CHARGE: Performed by: SURGERY

## 2024-08-15 PROCEDURE — 99238 HOSP IP/OBS DSCHRG MGMT 30/<: CPT | Performed by: SURGERY

## 2024-08-15 PROCEDURE — 83735 ASSAY OF MAGNESIUM: CPT | Performed by: EMERGENCY MEDICINE

## 2024-08-15 PROCEDURE — 85025 COMPLETE CBC W/AUTO DIFF WBC: CPT | Performed by: EMERGENCY MEDICINE

## 2024-08-15 PROCEDURE — 80048 BASIC METABOLIC PNL TOTAL CA: CPT | Performed by: EMERGENCY MEDICINE

## 2024-08-15 PROCEDURE — 99232 SBSQ HOSP IP/OBS MODERATE 35: CPT | Performed by: FAMILY MEDICINE

## 2024-08-15 PROCEDURE — 97116 GAIT TRAINING THERAPY: CPT

## 2024-08-15 PROCEDURE — 97530 THERAPEUTIC ACTIVITIES: CPT

## 2024-08-15 RX ORDER — LIDOCAINE 50 MG/G
1 PATCH TOPICAL DAILY
Qty: 5 PATCH | Refills: 0 | Status: SHIPPED | OUTPATIENT
Start: 2024-08-16 | End: 2024-08-21

## 2024-08-15 RX ORDER — MAGNESIUM HYDROXIDE/ALUMINUM HYDROXICE/SIMETHICONE 120; 1200; 1200 MG/30ML; MG/30ML; MG/30ML
30 SUSPENSION ORAL EVERY 4 HOURS PRN
Qty: 355 ML | Refills: 0 | Status: SHIPPED | OUTPATIENT
Start: 2024-08-15 | End: 2024-08-21

## 2024-08-15 RX ORDER — NYSTATIN 100000 [USP'U]/G
POWDER TOPICAL 2 TIMES DAILY
Qty: 30 G | Refills: 0 | Status: SHIPPED | OUTPATIENT
Start: 2024-08-15 | End: 2024-08-29

## 2024-08-15 RX ORDER — GABAPENTIN 100 MG/1
100 CAPSULE ORAL
Qty: 7 CAPSULE | Refills: 0 | Status: SHIPPED | OUTPATIENT
Start: 2024-08-15 | End: 2024-08-22

## 2024-08-15 RX ORDER — METOPROLOL SUCCINATE 25 MG/1
25 TABLET, EXTENDED RELEASE ORAL DAILY
Qty: 14 TABLET | Refills: 0 | Status: SHIPPED | OUTPATIENT
Start: 2024-08-15 | End: 2024-08-29

## 2024-08-15 RX ADMIN — SERTRALINE HYDROCHLORIDE 50 MG: 50 TABLET ORAL at 08:21

## 2024-08-15 RX ADMIN — ACETAMINOPHEN 975 MG: 325 TABLET, FILM COATED ORAL at 05:00

## 2024-08-15 RX ADMIN — PANTOPRAZOLE SODIUM 40 MG: 40 TABLET, DELAYED RELEASE ORAL at 05:00

## 2024-08-15 RX ADMIN — METOPROLOL SUCCINATE 25 MG: 25 TABLET, EXTENDED RELEASE ORAL at 08:21

## 2024-08-15 RX ADMIN — AMLODIPINE BESYLATE 2.5 MG: 2.5 TABLET ORAL at 08:21

## 2024-08-15 RX ADMIN — NYSTATIN: 100000 POWDER TOPICAL at 08:21

## 2024-08-15 RX ADMIN — HEPARIN SODIUM 5000 UNITS: 5000 INJECTION INTRAVENOUS; SUBCUTANEOUS at 05:00

## 2024-08-15 NOTE — PROGRESS NOTES
Progress Note - Geriatric Medicine   Oliva MARYJANE Dc 89 y.o. female MRN: [id number]  Unit/Bed#: S -01 Encounter: [id number]      Assessment/Plan:  Atrial flutter (HCC)  Assessment & Plan  Patient's pulse has been fluctuating and noted to reach 169 bpm.   ECG was ordered and shows atrial flutter with variable AV block.   Nonspecific ST and T wave abnormality   When compared with previous ECG on 8/11, vent. rate has increased by 48 BPM  Transfer to telemetry.  Cardiology consulted, appreciate input  PT asymptomatic.   Resolved with administration of IV Lopressor.   Currently maintaining NSR with heart rates in the 60s.   HR low today at 55 bpm.   Toprol-XL 25 mg daily started.   Monitor closely          Vitamin D insufficiency  Assessment & Plan  Vit D, 25-hydroxy level 20.1 ng/ml on 8/12/2024  Start vitamin D supplements  Should receive calcium supplements as well  Monitor Vitamin D, 25-hydroxy level    Dysphagia  Assessment & Plan  Patient complains of difficult and painful swallowing  Reports dry cough every time she eats  Patient complains of chronic excessive mucous production and expectoration prior to admission  Recommend trial of scopolamine transdermal patch or glycopyrrolate (Robinul)   Patient complains of right flank pain while eating and after swallowing  Noted to have hypersalivation during the encounter  Speech therapy follows appreciate input  Diet: regular diet and thin liquids   Meds: whole with liquid   Frequent Oral care: 2x/day  Aspiration/Reflux precautions   Other Recommendations/ considerations: consider VBS/barium swallow for formal imaging of swallowing vs GI consult    Hiatal hernia  Assessment & Plan  CT chest abdomen pelvis on 8/11/2024 shows small hiatal hernia  Patient complains of loss of appetite, dysphagia, and cough with increased mucus production  She states that she was taking Robitussin 100 mg/5 ml, 200 mg TID for her cough, but is no longer taking this  Patient currently  taking pantoprazole 40 mg daily inpatient  Continue current regimen per primary team   Speech pathology consulted, appreciate input    At risk for delirium  Assessment & Plan  -Patient is high risk of delirium due to pain, change in environment, urinary tract infection  -Initiate delirium precautions  -maintain normal sleep/wake cycle  -minimize overnight interruptions, group overnight vitals/labs/nursing checks as possible  -dim lights, close blinds and turn off tv to minimize stimulation and encourage sleep environment in evenings  -ensure that pain is well controlled   -monitor for fecal and urinary retention which may precipitate delirium  -encourage early mobilization and ambulation  -provide frequent reorientation and redirection  -encourage family and friends at the bedside to help calm patient if anxious  -avoid medications which may precipitate or worsen delirium such as tramadol, benzodiazepine, anticholinergics, and antihistaminics  -encourage hydration and nutrition , assist with feeding if needed  -redirect unwanted behaviors as first line, avoid physical restraints.     Chronic diarrhea  Assessment & Plan  Patient with history of chronic diarrhea.   Provide banatrol as needed for diarrhea  Monitor for dehydration and monitor electrolytes    Hypertension  Assessment & Plan  /60 today.  Patient takes amlodipine 2.5 mg daily and furosemide 20 mg daily at home.  Continue to monitor vitals daily.  Avoid hypotension  Advised about low salt diet and exercise.     Dementia without behavioral disturbance (HCC)  Assessment & Plan  Pt. Is AAOx3 at baseline   Currently AAOx3  Patient scored a 2/5 on mini cog  Pt. Lives at home with daughter   Mostly dependent for instrumental ADLs; patient reports that she cleans the house, vacuums, makes her bed, and takes out the garbage.  Mostly independent for ADLs; requires minimal/occasional assistance with bathing and dressing  Normal behaviors  Was taking donepezil  10 mg daily prior to admission  Consider discontinuing donepezil as it is contraindicated in patients <50 kg; however, patient feels as though it has really helped with her memory. Monitor closely.   TSH 3.59 on 6/24/2024  B12 251 on 8/12/2024  Recommend B12 supplementation 1000 mcg daily  CT shows stable diminished attenuation in the periventricular and subcortical matter likely due to moderate chronic microangiopathy.  [place] to person, place, time, and situation as needed  Monitor for behaviors   Monitor for mental status change  Provide supportive care     Ambulatory dysfunction  Assessment & Plan  Patient uses a cane at baseline.   History of falls.   Will continue PT/OT.  Placed on fall precautions.      Multiple falls  Assessment & Plan  Patient has a history of falls  Most recent fall was one month prior to this event   Per granddaughter, patient has had increased dizziness/light-headedness upon standing, leading to falls   Monitor orthostatic vitals  Fall precautions      * Closed fracture of ninth thoracic vertebra (HCC)  Assessment & Plan  Patient presented to the ED on 8/11 after a fall that occurred on 8/7  Patient complained of increasing thoracic back pain   CT abdomen pelvis demonstrates mild superior T9 compression deformity and severe chronic T7 compression deformity   Neurosurgery follows, appreciate input  Pain is currently controlled. Continue tylenol ATC, gabapentin 100 mg QHS  DVT prophylaxis with SCDs bilateral legs  Spine precautions  PT/OT as indicated   TLSO brace to be worn PRN for comfort as tolerated.   At this time, no nsx intervention is anticipated. Recommend conservative management with pain control and PT as needed.  On patient's progress with PT and family's ability to provide 24/7 assist to patient patient is now recommended for Level III Minimum Resource Intensity. If family unable to provide 24/7 care/assist patient would then be appropriate for Level II Moderate Resource  Intensity.       Acute UTI (urinary tract infection)-resolved as of 8/14/2024  Assessment & Plan  Abnormal UA present on admission   Patient started on Rocephin 1000 mg x 3 doses   Monitor for signs of urinary retention   Continue current regimen per primary team       Subjective:   Oliva is a 89 y.o. female being seen for geriatric medicine follow-up. Patient is AAOx3 today. Patient is very upset at time of encounter because she reports that after being bathed she was left alone in the bathroom with no way to call for help for about 15 minutes. Besides this event, she reports that she is doing well. She slept well overnight. She was able to eat most of her breakfast this morning. Denies pain. She reports increased urinary frequency overnight. Denies dysuria. Patient had a bowel movement this morning. She is eager to go home.     Review of Systems   Constitutional:  Negative for chills and fever.   HENT:  Positive for hearing loss and trouble swallowing. Negative for congestion and rhinorrhea.    Respiratory:  Negative for cough, shortness of breath and wheezing.    Cardiovascular:  Negative for chest pain, palpitations and leg swelling.   Gastrointestinal:  Negative for abdominal pain and constipation.   Endocrine: Negative for cold intolerance.   Genitourinary:  Positive for frequency. Negative for difficulty urinating, dysuria and hematuria.   Musculoskeletal:  Positive for gait problem.   Skin:  Negative for wound.   Allergic/Immunologic: Negative for environmental allergies.   Neurological:  Positive for weakness. Negative for dizziness and seizures.   Hematological:  Does not bruise/bleed easily.   Psychiatric/Behavioral:  Negative for behavioral problems and sleep disturbance.          Objective:     Vitals: Blood pressure 148/60, pulse 55, temperature 97.6 °F (36.4 °C), resp. rate 16, height 5' (1.524 m), weight 46.9 kg (103 lb 6.3 oz), SpO2 95%.,Body mass index is 20.19 kg/m².      Intake/Output Summary  (Last 24 hours) at 8/15/2024 1115  Last data filed at 8/15/2024 0900  Gross per 24 hour   Intake 180 ml   Output --   Net 180 ml       Current Medications: Reviewed    Physical Exam:   Physical Exam  Vitals and nursing note reviewed.   Constitutional:       General: She is not in acute distress.     Appearance: She is well-developed.      Comments: Frail looking   HENT:      Head: Normocephalic and atraumatic.      Right Ear: External ear normal.      Left Ear: External ear normal.      Ears:      Comments: [place]  Eyes:      Conjunctiva/sclera: Conjunctivae normal.   Cardiovascular:      Rate and Rhythm: Regular rhythm. Bradycardia present.      Heart sounds: No murmur heard.  Pulmonary:      Effort: Pulmonary effort is normal. No respiratory distress.      Breath sounds: Normal breath sounds.   Abdominal:      Palpations: Abdomen is soft.      Tenderness: There is no abdominal tenderness.   Musculoskeletal:         General: No swelling.      Cervical back: Neck supple.      Right lower leg: No edema.      Left lower leg: No edema.   Skin:     General: Skin is warm and dry.      Capillary Refill: Capillary refill takes less than 2 seconds.   Neurological:      Mental Status: She is alert and oriented to person, place, and time. Mental status is at baseline.      Motor: Weakness present.      Gait: Gait abnormal.   Psychiatric:         Attention and Perception: Attention normal.          Invasive Devices       Peripheral Intravenous Line  Duration             Peripheral IV 08/11/24 Left;Ventral (anterior) Forearm 4 days    Peripheral IV 08/11/24 Right;Ventral (anterior) Forearm 4 days                    Lab, Imaging and other studies: I have personally reviewed pertinent reports.

## 2024-08-15 NOTE — PLAN OF CARE
Problem: OCCUPATIONAL THERAPY ADULT  Goal: Performs self-care activities at highest level of function for planned discharge setting.  See evaluation for individualized goals.  Description: Treatment Interventions: ADL retraining, Functional transfer training, Endurance training, Patient/family training, Compensatory technique education, Continued evaluation, Energy conservation, Activityengagement          See flowsheet documentation for full assessment, interventions and recommendations.   Outcome: Progressing  Note: Limitation: Decreased ADL status, Decreased Safe judgement during ADL, Decreased endurance, Decreased cognition, Decreased self-care trans, Decreased high-level ADLs  Prognosis: Good  Assessment: Patient seen for OT treatment on 8/15/2024 s/p admission for Closed fracture of ninth thoracic vertebra (HCC) Patient agreeable to OT session. Patient participated in toileting, dressing , self-care transfers, and functional mobility with intervention focus on maximizing functional independence, activity tolerance for ADLs. Oliva Dc is showing improvements in activity tolerance and functional mobility distances but is continuing to be limited by fatigue, pain, continues to require Min A for safe completion of all functional mobility/self care transfers.. Per CM, family able to provide 24/7 assistance upon D/C. Would recommend level III minimal resource intensity if family able to provide. If unable, would recommend level II moderate resource intensity at D/C. From OT standpoint, patient would benefit from skilled intervention to maximize independence with ADLs and functional mobility. Goals remain appropriate, continue POC.     Rehab Resource Intensity Level, OT: III (Minimum Resource Intensity) (pending family able to provide Min A assistance for all ADLs and mobility, present for 24/7 assistance. If family unable to provide, would recommend level II)   Tess Mobley, OT

## 2024-08-15 NOTE — DISCHARGE SUMMARY
Discharge Summary - Oliva Dc 89 y.o. female MRN: [id number]    Unit/Bed#: S -01 Encounter: [id number]    Admission Date:   Admission Orders (From admission, onward)       Ordered        08/11/24 1125  Inpatient Admission  Once                            Admitting Diagnosis: Chest pain [R07.9]  Elevated blood pressure reading [R03.0]  Dyspnea [R06.00]  SIRS (systemic inflammatory response syndrome) (HCC) [R65.10]  Thoracic compression fracture (HCC) [S22.000A]  Pancreatic lesion [K86.9]  Fall, initial encounter [W19.XXXA]  Unspecified multiple injuries, initial encounter [T07.XXXA]    HPI: Patient is 89-year-old female, who presented status post mechanical fall found to have T9 compression fracture as well as UTI.    Procedures Performed:   Orders Placed This Encounter   Procedures    Critical Care       Summary of Hospital Course: Patient was evaluated by neurosurgical team, no operative intervention recommended, TLSO provide, UTI treated with Rocephin.    Hospital Course: No notes on file    Significant Findings, Care, Treatment and Services Provided: See above    Complications: None    Discharge Diagnosis: T9 compression fracture, UTI, TAMEKA    Medical Problems       Resolved Problems  Date Reviewed: 8/12/2024            Resolved    Acute UTI (urinary tract infection) 8/14/2024     Resolved by  Héctor Benjamin MD                    Condition at Discharge: stable         Discharge instructions/Information to patient and family:   See after visit summary for information provided to patient and family.      Provisions for Follow-Up Care:  See after visit summary for information related to follow-up care and any pertinent home health orders.      PCP: No primary care provider on file.    Disposition: See After Visit Summary for discharge disposition information.    Planned Readmission: No      Discharge Statement   I spent 25 minutes discharging the patient. This time was spent on the day of  discharge. I had direct contact with the patient on the day of discharge. Additional documentation is required if more than 30 minutes were spent on discharge.     Discharge Medications:  See after visit summary for reconciled discharge medications provided to patient and family.

## 2024-08-15 NOTE — PLAN OF CARE
Problem: PHYSICAL THERAPY ADULT  Goal: Performs mobility at highest level of function for planned discharge setting.  See evaluation for individualized goals.  Description: Treatment/Interventions: Functional transfer training, LE strengthening/ROM, Elevations, Therapeutic exercise, Endurance training, Gait training, Bed mobility, Equipment eval/education, Patient/family training          See flowsheet documentation for full assessment, interventions and recommendations.  Outcome: Progressing  Note:    Problem List: Decreased strength, Decreased endurance, Impaired balance, Decreased mobility, Decreased coordination, Decreased cognition, Impaired judgement, Decreased safety awareness, Pain, Orthopedic restrictions, Impaired hearing  Assessment: Patient agreeable to PT session.  Patient with limited tolerance to PT session 2/2 to weakness and generalized deconditioning, pain, and limited overall activity tolerance.  While admitted, patient will continue to benefit from skilled physical therapy services to increase her strength, balance, safe functional mobility and gait with a roller walker.  Patient will benefit from continued use of TLSOPRN for comfort.  On patient's progress with PT and family's ability to provide 24/7 assist to patient patient is now recommended for Level III Minimum Resource Intensity which is a change from initial PT recommendation.  CM is aware of change in DCP recommendation.  If family unable to provide 24/7 care/assist patient would then be appropriate for Level II Moderate Resource Intensity.        Rehab Resource Intensity Level, PT: III (Minimum Resource Intensity)    See flowsheet documentation for full assessment.

## 2024-08-15 NOTE — PLAN OF CARE
Problem: PAIN - ADULT  Goal: Verbalizes/displays adequate comfort level or baseline comfort level  Description: Interventions:  - Encourage patient to monitor pain and request assistance  - Assess pain using appropriate pain scale  - Administer analgesics based on type and severity of pain and evaluate response  - Implement non-pharmacological measures as appropriate and evaluate response  - Consider cultural and social influences on pain and pain management  - Notify physician/advanced practitioner if interventions unsuccessful or patient reports new pain  Outcome: Progressing     Problem: INFECTION - ADULT  Goal: Absence or prevention of progression during hospitalization  Description: INTERVENTIONS:  - Assess and monitor for signs and symptoms of infection  - Monitor lab/diagnostic results  - Monitor all insertion sites, i.e. indwelling lines, tubes, and drains  - Monitor endotracheal if appropriate and nasal secretions for changes in amount and color  - [place] appropriate cooling/warming therapies per order  - Administer medications as ordered  - Instruct and encourage patient and family to use good hand hygiene technique  - Identify and instruct in appropriate isolation precautions for identified infection/condition  Outcome: Progressing  Goal: Absence of fever/infection during neutropenic period  Description: INTERVENTIONS:  - Monitor WBC    Outcome: Progressing

## 2024-08-15 NOTE — PROGRESS NOTES
[facility]  Progress Note  Name: Oliva Dc I  MRN: [id number]  Unit/Bed#: S -01 I Date of Admission: 8/11/2024   Date of Service: 8/15/2024 I Hospital Day: 4    Assessment & Plan   Atrial flutter (HCC)  Assessment & Plan  Patient's pulse has been fluctuating today and noted to reach 169 bpm.   ECG was ordered and shows atrial flutter with variable AV block.   Nonspecific ST and T wave abnormality   When compared with previous ECG on 8/11, vent. rate has increased by 48 BPM  Transfer to telemetry.  Cardiology consulted, recommend Toprol-XL 25 mg daily  Monitor closely          SIRS (systemic inflammatory response syndrome) ([facility])  Assessment & Plan  - SIRS present on admission  - Tachycardia, leukocytosis, lactic acidosis, TAMEKA present on admission  - Resolved with IV fluids and antibiotics for UTI  - Blood cultures no growth at 24 hrs    Multiple falls  Assessment & Plan  - Status post multiple falls with the below noted injuries.  - Fall precautions.  - Geriatric Medicine consultation for evaluation, medication review and recommendations.  - PT and OT evaluation and treatment as indicated.  - Case Management consultation for disposition planning.      * Closed fracture of ninth thoracic vertebra (HCC)  Assessment & Plan  - T9 fracture  - Appreciate neurosurgery consult and recommendations  - TLSO brace and uprights  - Neurovascularly intact  - Pain control  - PT and OT             Bowel Regimen: MiraLAX, Senokot  VTE Prophylaxis:Sequential compression device (Venodyne)  and Heparin     Disposition: Anticipate discharge within 24 hours    Subjective   Chief Complaint: Had to go to the bathroom more often    Subjective: Patient states she was doing well, notes that she had to go to the bathroom a few times, needed assistance to get to the bathroom.  Patient denies any dysuria, change in smell, abdominal pain, flank pain.  Patient denies any sensation of chills, chest pain,  difficulty breathing, nausea, vomiting, change in bowel habits, or any other acute concerns at this time.     Objective   Vitals:   Temp:  [97.6 °F (36.4 °C)-98.2 °F (36.8 °C)] 97.6 °F (36.4 °C)  HR:  [53-79] 55  Resp:  [15-18] 16  BP: (126-151)/(51-61) 148/60    I/O         08/13 0701  08/14 0700 08/14 0701  08/15 0700 08/15 0701 08/16 0700    P.O.  0     I.V. (mL/kg)       Total Intake(mL/kg)  0 (0)     Urine (mL/kg/hr)  200 (0.2)     Stool  0     Total Output  200     Net  -200            Unmeasured Stool Occurrence 1 x 1 x              Physical Exam:   GENERAL APPEARANCE: Well-appearing, no acute distress  NEURO: GCS 15  HEENT: Normocephalic, atraumatic  CV: Regular rate and rhythm  LUNGS: Lungs clear to auscultation bilaterally  GI: Soft, nontender palpation, no guarding, no rebound  : Voiding independently  MSK: Moving all extremities spontaneously, strength 5 out of 5, neurovascularly intact  SKIN: Warm, dry, intact      Invasive Devices       Peripheral Intravenous Line  Duration             Peripheral IV 08/11/24 Left;Ventral (anterior) Forearm 4 days    Peripheral IV 08/11/24 Right;Ventral (anterior) Forearm 3 days                          Lab Results: Results: I have personally reviewed all pertinent laboratory/tests results, BMP/CMP:   Lab Results   Component Value Date    SODIUM 139 08/15/2024    K 3.7 08/15/2024     08/15/2024    CO2 27 08/15/2024    BUN 23 08/15/2024    CREATININE 0.96 08/15/2024    CALCIUM 8.4 08/15/2024    EGFR 48 08/15/2024   , and CBC:   Lab Results   Component Value Date    WBC 7.83 08/15/2024    HGB 11.7 08/15/2024    HCT 36.8 08/15/2024     (H) 08/15/2024     08/15/2024    RBC 3.66 (L) 08/15/2024    MCH 32.0 08/15/2024    MCHC 31.8 08/15/2024    RDW 13.2 08/15/2024    MPV 10.0 08/15/2024    NRBC 0 08/15/2024     Imaging: I have personally reviewed pertinent reports.     Other Studies: N/A

## 2024-08-15 NOTE — OCCUPATIONAL THERAPY NOTE
Occupational Therapy Treatment Note  Patient Name: Oliva Dc  Today's Date: 8/15/2024  Problem List  Principal Problem:    Closed fracture of ninth thoracic vertebra (HCC)  Active Problems:    Multiple falls    Ambulatory dysfunction    Dementia without behavioral disturbance (HCC)    Hypertension    Chronic diarrhea    At risk for delirium    Hiatal hernia    Dysphagia    SIRS (systemic inflammatory response syndrome) (HCC)    Vitamin D insufficiency    Atrial flutter (HCC)       08/15/24 0920   OT Last Visit   OT Visit Date 08/15/24   Note Type   Note Type Treatment   Pain Assessment   Pain Assessment Tool 0-10   Pain Score 7  (0/10 at rest, 7/10 following functional tasks-   donned brace for comfort. (pt initially did not have on upon arrival))   Pain Location/Orientation Location: Back   Pain Onset/Description Onset: Ongoing;Descriptor: Discomfort   Effect of Pain on Daily Activities comfort, activity tolerance   Hospital Pain Intervention(s) Repositioned;Rest;Ambulation/increased activity  (applied TLSO brace.)   Multiple Pain Sites No   Restrictions/Precautions   Weight Bearing Precautions Per Order No   Braces or Orthoses TLSO  (backpack TLSO - PRN for comfort)   Other Precautions Chair Alarm;Bed Alarm;Cognitive;Hard of hearing;Fall Risk;Pain;Multiple lines;Spinal precautions   Lifestyle   Autonomy pta pt was (I) w ADLs and (A) w IADLs, able to do light cleaning around the house, (-) , at least 2 falls, lives w family   Reciprocal Relationships daughter   Service to Others retired   Intrinsic Gratification her independence   ADL   Where Assessed Standing at sink  (vs toilet, recliner)   Eating Assistance 6  Modified independent   Eating Deficit   (finishing meal tray upon arrival)   Grooming Assistance 5  Supervision/Setup   Grooming Deficit   (washes hands standing at sink, close supervision for safety. Encouraged completion of oral care, however pt declining d/t fatigue.)   LB Dressing  Assistance 4  Minimal Assistance   LB Dressing Deficit Increased time to complete;Supervision/safety;Verbal cueing;Setup;Requires assistive device for steadying;Thread RLE into underwear;Thread LLE into underwear;Pull up over hips  (intermittent A to thread BLE through underwear- good compliance to spinal precautions.  Min A steadying for pulling over hips)   Toileting Assistance  3  Moderate Assistance   Toileting Deficit Perineal hygiene;Clothing management up;Bedside commode;Supervison/safety;Increased time to complete;Setup;Verbal cueing;Steadying  (BSC over toilet)   Toileting Comments A for clothing management, pt attempts posterior care, requires A for completion / thoroughness to task   Functional Standing Tolerance   Time 1 min   Activity grooming tasks at sink   Comments declines further d/t increased pain and fatigue   Bed Mobility   Additional Comments seated OOB in recliner upon arrival, end of session   Transfers   Sit to Stand 4  Minimal assistance   Additional items Assist x 1;Increased time required;Verbal cues  (cues for hand placements)   Stand to Sit 4  Minimal assistance   Additional items Assist x 1;Verbal cues;Impulsive  (cueing for hand placements, proximity to sitting surfaces)   Toilet transfer 4  Minimal assistance   Additional items Assist x 1;Commode;Armrests;Increased time required;Verbal cues  (BSC over toilet. cues for hand placements onto armrests)   Additional Comments RW used during transfers   Functional Mobility   Functional Mobility 4  Minimal assistance   Additional Comments household distance within room, bathroom. Intermittent cueing needed for LOBs. Pt declines further mobility d/t increased fatigue and pain.   Additional items Rolling walker   Toilet Transfers   Toilet Transfer From Rolling walker   Toilet Transfer Type To and from   Toilet Transfer to Standard bedside commode  (over toilet)   Toilet Transfer Technique Ambulating   Toilet Transfers Minimal assistance;Verbal  cues   Subjective   Subjective pt very appreciative of assistance, reports significant fatigue following ADL tasks   Cognition   Overall Cognitive Status Impaired  (appears at / near baseline and appropriate for age)   Arousal/Participation Alert;Cooperative   Attention Attends with cues to redirect   Orientation Level Oriented to person   Memory Decreased recall of precautions;Decreased short term memory   Following Commands Follows one step commands with increased time or repetition   Comments Pt agreeable to OT session. Limited carryover of learned techniques throughout session. Per CM, pt will have 24/7 assistance upon D/C   Activity Tolerance   Activity Tolerance Patient limited by fatigue;Patient limited by pain   Medical Staff Made Aware Care coordination c PT Tory. CM Barrett. RN Arabella   Assessment   Assessment Patient seen for OT treatment on 8/15/2024 s/p admission for Closed fracture of ninth thoracic vertebra (HCC) Patient agreeable to OT session. Patient participated in toileting, dressing , self-care transfers, and functional mobility with intervention focus on maximizing functional independence, activity tolerance for ADLs. Oliva Dc is showing improvements in activity tolerance and functional mobility distances but is continuing to be limited by fatigue, pain, continues to require Min A for safe completion of all functional mobility/self care transfers.. Per CM, family able to provide 24/7 assistance upon D/C. Would recommend level III minimal resource intensity if family able to provide. If unable, would recommend level II moderate resource intensity at D/C. From OT standpoint, patient would benefit from skilled intervention to maximize independence with ADLs and functional mobility. Goals remain appropriate, continue POC.   Plan   Treatment Interventions ADL retraining;Functional transfer training;UE strengthening/ROM;Endurance training;Patient/family training;Equipment  evaluation/education;Compensatory technique education;Activityengagement   Goal Expiration Date 08/22/24   OT Treatment Day 1   OT Frequency 2-3x/wk   Discharge Recommendation   Rehab Resource Intensity Level, OT III (Minimum Resource Intensity)  (pending family able to provide Min A assistance for all ADLs and mobility, present for 24/7 assistance. If family unable to provide, would recommend level II)   Additional Comments  The patient's raw score on the -PAC Daily Activity Inpatient Short Form is 18. A raw score of less than 19 suggests the patient may benefit from discharge to post-acute rehabilitation services. Please refer to the recommendation of the Occupational Therapist for safe discharge planning.   AM-PAC Daily Activity Inpatient   Lower Body Dressing 3   Bathing 3   Toileting 2   Upper Body Dressing 3   Grooming 3   Eating 4   Daily Activity Raw Score 18   Daily Activity Standardized Score (Calc for Raw Score >=11) 38.66   AM-PAC Applied Cognition Inpatient   Following a Speech/Presentation 2   Understanding Ordinary Conversation 3   Taking Medications 2   Remembering Where Things Are Placed or Put Away 2   Remembering List of 4-5 Errands 1   Taking Care of Complicated Tasks 1   Applied Cognition Raw Score 11   Applied Cognition Standardized Score 27.03   End of Consult   Education Provided Yes   Patient Position at End of Consult Bed/Chair alarm activated;All needs within reach;Bedside chair  (TLSO donned)   Nurse Communication Nurse aware of consult  (Arabella)   GOALS     Pt will improve activity tolerance to G for min 30 min txment sessions for increase engagement in functional tasks PROGRESSING     Pt will complete bed mobility at a Mod I level w/ G balance/safety demonstrated to decrease caregiver assistance required      Pt will complete UB dressing/self care w/ mod I using adaptive device and DME as needed      Pt will complete LB dressing/self care w/ mod I using adaptive device and DME as  needed PROGRESSING     Pt will complete toileting w/ mod I w/ G hygiene/thoroughness using DME as needed PROGRESSING     Pt will improve functional transfers to Mod I on/off all surfaces using DME as needed w/ G balance/safety      Pt will improve functional mobility during ADL/IADL/leisure tasks to Mod I using DME as needed w/ G balance/safety      Tess Mobley, OT

## 2024-08-15 NOTE — CASE MANAGEMENT
Case Management Discharge Planning Note    Patient name Oliva Dc  [facility] /S -01 MRN [id number]  : 3/8/1924 Date 8/15/2024       Current Admission Date: 2024  Current Admission Diagnosis:Closed fracture of ninth thoracic vertebra (HCC)   Patient Active Problem List    Diagnosis Date Noted Date Diagnosed    Vitamin D insufficiency 2024     Atrial flutter (HCC) 2024     Multiple falls 2024     Ambulatory dysfunction 2024     Dementia without behavioral disturbance (HCC) 2024     Hypertension 2024     Chronic diarrhea 2024     At risk for delirium 2024     Hiatal hernia 2024     Dysphagia 2024     SIRS (systemic inflammatory response syndrome) (HCC) 2024     Closed fracture of ninth thoracic vertebra (HCC) 2024       LOS (days): 4  Geometric Mean LOS (GMLOS) (days): 2.9  Days to GMLOS:-1.2     OBJECTIVE:  Risk of Unplanned Readmission Score: 12.49         Current admission status: Inpatient   Preferred Pharmacy:   UNKNOWN - FOLLOW UP PRIOR TO DISCHARGE TO E-PRESCRIBE  No address on file      [place] - [address]  [address] [facility] [address]  Phone: [phone] Fax: [phone]    Primary Care Provider: No primary care provider on file.    Primary Insurance: [employer]  Secondary Insurance:     DISCHARGE DETAILS:    Discharge planning discussed with:: granddaughter, Eulalia over phone  Freedom of Choice: Yes  Comments - Freedom of Choice: home with 24/7 assistance (from family), DME, [facility]  CM contacted family/caregiver?: Yes  Were Treatment Team discharge recommendations reviewed with patient/caregiver?: Yes  Did patient/caregiver verbalize understanding of patient care needs?: Yes  Were patient/caregiver advised of the risks associated with not following Treatment Team discharge recommendations?: Yes    Contacts  Patient Contacts: Eulalia  Relationship to  Patient:: Family (granddaughter)  Contact Method: Phone  Phone Number: [phone]  Reason/Outcome: Continuity of Care, Emergency Contact, Referral, Discharge Planning    Requested Home Health Care         Is the patient interested in HHC at discharge?: Yes  Home Health Discipline requested:: Nursing, Occupational Therapy, Physical Therapy  Home Health Agency Name:: [facility]  Home Health Follow-Up Provider:: PCP  Home Health Services Needed:: Gait/ADL Training, Evaluate Functional Status and Safety, Strengthening/Theraputic Exercises to Improve Function  Homebound Criteria Met:: Uses an Assist Device (i.e. cane, walker, etc), Requires the Assistance of Another Person for Safe Ambulation or to Leave the Home  Supporting Clincal Findings:: Limited Endurance, Cognitive Deficit Requiring the Assistance of Others    DME Referral Provided  Referral made for DME?: Yes  DME referral completed for the following items:: Walker, Bedside Commode  DME Supplier Name:: [employer]    Other Referral/Resources/Interventions Provided:  Interventions: HHC, DME  Referral Comments: Walker and BSC delivered to bedside; delivery slip signed, patient copy provided, original placed in [employer] folder on S2. CM spoke with granddeniaughterEulalia over phone-- Eulalia aware and agreeable to dcp to home w/ HHC today- aware DME is in room (RN also made aware). Eulalia aware [facility] will be in touch within 24-48hrs to initiate  care. Per Eulalia, her children (patients great grand children) are on their way to [facility] to pick patient up- RN made aware. No further CM needs anticipated at this time.    Would you like to participate in our [facility] service program?  : No - Declined    Treatment Team Recommendation: Home with Home Health Care (level II)  Discharge Destination Plan:: Home with Home Health Care  Transport at Discharge : Family    IMM Given (Date):: 08/15/24  IMM Given to:: Family (reviewed with Eulalia, over phone)  IMM reviewed with  patient's caregiver, patient's caregiver agrees with discharge determination.

## 2024-08-15 NOTE — PHYSICAL THERAPY NOTE
PT TREATMENT     08/15/24 0900   PT Last Visit   PT Visit Date 08/15/24   Note Type   Note Type Treatment   Pain Assessment   Pain Assessment Tool 0-10   Pain Score 7   Pain Location/Orientation Location: Back   Pain Onset/Description Frequency: Intermittent   Effect of Pain on Daily Activities Limits comfort and activity tolerance   Patient's Stated Pain Goal No pain   Hospital Pain Intervention(s) Repositioned;Ambulation/increased activity;Emotional support;Rest  (Donning of TLSO)   Restrictions/Precautions   Braces or Orthoses TLSO  (Patient is dependent to don; TLSO as needed for comfort)   Other Precautions Cognitive;Fall Risk;Bed Alarm;Chair Alarm;Pain;Hard of hearing;Spinal precautions   General   Chart Reviewed Yes   Family/Caregiver Present No   Cognition   Overall Cognitive Status Impaired   Arousal/Participation Cooperative   Attention Attends with cues to redirect   Orientation Level Oriented to person   Memory Decreased recall of precautions;Decreased recall of recent events;Decreased short term memory   Following Commands Follows one step commands with increased time or repetition   Comments At least 2 patient identifiers including name and date of birth   Subjective   Subjective Patient pleasant and cooperative, agreeable to PT session   Bed Mobility   Additional Comments Patient received out of bed in chair not wearing TLSO.  TLSO donned to patient for comfort midway through PT session - not donned when patient first mobilized as patient was anxious to get to the bathroom to move her bowels   Transfers   Sit to Stand 4  Minimal assistance   Additional items Armrests;Assist x 1;Verbal cues;Increased time required  (Repeated cues for safe hand placement)   Stand to Sit 4  Minimal assistance   Additional items Armrests;Assist x 1;Verbal cues;Increased time required  (Reputed cues for safe hand placement)   Additional Comments After patient returned to bedside chair from bathroom and following a brief  "rest, ET assisted patient to don TLSO with dependent assist.  TLSO initially donned with patient in sitting progressing to standing and patient stood for approximately 2 minutes for proper fitting/adjustment of TLSO.  Patient then declined further ambulation 2/2 to fatigue -\"I need a break\"   Ambulation/Elevation   Gait pattern Foward flexed;Decreased foot clearance;Short stride;Step through pattern  (Occasional, mild multidirectional balance loss and slight knee buckling with fatigue)   Gait Assistance 4  Minimal assist   Additional items Assist x 1;Verbal cues;Tactile cues   Assistive Device Rolling walker   Distance 10 to 12 feet to/from the bathroom; patient with OT for hygiene after BM and washing hands at sink   Balance   Static Sitting Fair +   Static Standing Fair -  (With roller walker)   Ambulatory Fair -  (With RW)   Endurance Deficit   Endurance Deficit Yes   Activity Tolerance   Activity Tolerance Patient limited by fatigue;Patient limited by pain;Treatment limited secondary to medical complications (Comment)  (Weakness and deconditioning)   Nurse Made Aware ELIJAH Bell; APRIL Pulido   Assessment   Problem List Decreased strength;Decreased endurance;Impaired balance;Decreased mobility;Decreased coordination;Decreased cognition;Impaired judgement;Decreased safety awareness;Pain;Orthopedic restrictions;Impaired hearing   Assessment Patient agreeable to PT session.  Patient with limited tolerance to PT session 2/2 to weakness and generalized deconditioning, pain, and limited overall activity tolerance.  While admitted, patient will continue to benefit from skilled physical therapy services to increase her strength, balance, safe functional mobility and gait with a roller walker.  Patient will benefit from continued use of TLSOPRN for comfort.  On patient's progress with PT and family's ability to provide 24/7 assist to patient patient is now recommended for Level III Minimum Resource Intensity which is a " change from initial PT recommendation.  CM is aware of change in DCP recommendation.  If family unable to provide 24/7 care/assist patient would then be appropriate for Level II Moderate Resource Intensity.    The patient's AM-Merged with [facility] Basic Mobility Inpatient Short Form Raw Score is 16. A Raw score of less than or equal to 16 suggests the patient may benefit from discharge to post-acute rehabilitation services. Please also refer to the recommendation of the Physical Therapist for safe discharge planning.   Goals   [facility] Expiration Date 08/25/24   Short Term Goal #1 pt will: Increase bilateral LE strength 1/2 grade to facilitate independent mobility, Perform bed mobility modified independent to increase level of independence, Perform all transfers modified independent to improve independence, Ambulate 150 ft. with least restrictive assistive device w/ supervision w/o LOB to improve functional independence, Navigate 1 stair(s) w/ minx1 with unilateral handrail to facilitate return to previous living environment, Increase ambulatory balance 1 grade to decrease risk for falls, Complete exercise program independently to increase strength and endurance, Tolerate 3 hr OOB to faciliate upright tolerance, Improve Barthel Index score to 70 or greater to facilitate independence, Don and doff LSO and make adjustments to QuickDraws w/ supervision to improve level of independence, and Complete Timed Up and Go or Comfortable Gait Speed to further assess mobility and monitor progress   Plan   Treatment/Interventions ADL retraining;Functional transfer training;LE strengthening/ROM;Therapeutic exercise;Endurance training;Cognitive reorientation;Patient/family training;Equipment eval/education;Bed mobility;Gait training;Compensatory technique education;Spoke to nursing;Spoke to case management;OT   PT Frequency 3-5x/wk   Discharge Recommendation   Rehab Resource Intensity Level, PT III (Minimum Resource Intensity)   -PAC Basic Mobility  Inpatient   Turning in Flat Bed Without Bedrails 3   Lying on Back to Sitting on Edge of Flat Bed Without Bedrails 3   Moving Bed to Chair 3   Standing Up From Chair Using Arms 3   Walk in Room 3   Climb 3-5 Stairs With Railing 1   Basic Mobility Inpatient Raw Score 16   Basic Mobility Standardized Score 38.32   [facility] Highest Level Of Mobility   -HL Goal 5: Stand one or more mins   JH-HLM Achieved 6: Walk 10 steps or more   Education   Education Provided Mobility training;Assistive device  (TLSO)   Patient Explanation/teachback used;Reinforcement needed   End of Consult   Patient Position at End of Consult Bed/Chair alarm activated;Bedside chair;All needs within reach   Licensure   [place] License Number  Gisella L Audie, PT     Portions of the documentation may have been created using voice recognition software. Occasional wrong word or sound alike substitutions may have occurred due to the inherent limitation of the voice recognition software. Read the chart carefully and recognize, using context, where substitutions have occurred.

## 2024-08-16 ENCOUNTER — HOME CARE VISIT (OUTPATIENT)
Dept: HOME HEALTH SERVICES | Facility: HOME HEALTHCARE | Age: 89
End: 2024-08-16

## 2024-08-16 LAB
BACTERIA BLD CULT: NORMAL
BACTERIA BLD CULT: NORMAL
DME PARACHUTE DELIVERY DATE ACTUAL: NORMAL
DME PARACHUTE DELIVERY DATE ACTUAL: NORMAL
DME PARACHUTE DELIVERY DATE REQUESTED: NORMAL
DME PARACHUTE DELIVERY DATE REQUESTED: NORMAL
DME PARACHUTE ITEM DESCRIPTION: NORMAL
DME PARACHUTE ITEM DESCRIPTION: NORMAL
DME PARACHUTE ORDER STATUS: NORMAL
DME PARACHUTE ORDER STATUS: NORMAL
DME PARACHUTE SUPPLIER NAME: NORMAL
DME PARACHUTE SUPPLIER NAME: NORMAL
DME PARACHUTE SUPPLIER PHONE: NORMAL
DME PARACHUTE SUPPLIER PHONE: NORMAL

## 2024-08-16 NOTE — UTILIZATION REVIEW
NOTIFICATION OF ADMISSION DISCHARGE   This is a Notification of Discharge from [facility]. Please be advised that this patient has been discharge from our facility. Below you will find the admission and discharge date and time including the patient’s disposition.   UTILIZATION REVIEW CONTACT:  Lida Chavira  Utilization   Network Utilization Review Department  Phone: [phone] x carefully listen to the prompts. All voicemails are confidential.  Email: [email or web address]     ADMISSION INFORMATION  PRESENTATION DATE: 8/11/2024  8:33 AM  OBERVATION ADMISSION DATE: N/A  INPATIENT ADMISSION DATE: 8/11/24 11:25 AM   DISCHARGE DATE: 8/15/2024  1:41 PM   DISPOSITION:Home with Home Health Care    Network Utilization Review Department  ATTENTION: Please call with any questions or concerns to [phone] and carefully listen to the prompts so that you are directed to the right person. All voicemails are confidential.   For Discharge needs, contact Care Management DC Support Team at [phone] opt. 2  Send all requests for admission clinical reviews, approved or denied determinations and any other requests to dedicated fax number below belonging to the campus where the patient is receiving treatment. List of dedicated fax numbers for the Facilities:  FACILITY NAME UR FAX NUMBER   ADMISSION DENIALS (Administrative/Medical Necessity) [phone]   DISCHARGE SUPPORT TEAM ([facility]) [phone]   PARENT CHILD HEALTH (Maternity/NICU/Pediatrics) [phone]   [facility] [phone]   [facility] [phone]   [facility] [phone]   [facility] [phone]   [facility] [phone]   [facility] [phone]   [facility] [phone]   [facility]  [phone]   [facility] [phone]   [facility] [phone]   [facility] [phone]   [facility] [phone]

## 2024-08-17 ENCOUNTER — HOME CARE VISIT (OUTPATIENT)
Dept: HOME HEALTH SERVICES | Facility: HOME HEALTHCARE | Age: 89
End: 2024-08-17
Payer: COMMERCIAL

## 2024-08-17 VITALS
HEART RATE: 72 BPM | SYSTOLIC BLOOD PRESSURE: 120 MMHG | DIASTOLIC BLOOD PRESSURE: 62 MMHG | TEMPERATURE: 97 F | RESPIRATION RATE: 16 BRPM | OXYGEN SATURATION: 97 %

## 2024-08-17 PROCEDURE — 400013 VN SOC

## 2024-08-17 PROCEDURE — G0299 HHS/HOSPICE OF RN EA 15 MIN: HCPCS

## 2024-08-20 ENCOUNTER — HOME CARE VISIT (OUTPATIENT)
Dept: HOME HEALTH SERVICES | Facility: HOME HEALTHCARE | Age: 89
End: 2024-08-20
Payer: COMMERCIAL

## 2024-08-20 VITALS
OXYGEN SATURATION: 94 % | SYSTOLIC BLOOD PRESSURE: 128 MMHG | DIASTOLIC BLOOD PRESSURE: 72 MMHG | TEMPERATURE: 97.7 F | HEART RATE: 60 BPM | RESPIRATION RATE: 16 BRPM

## 2024-08-20 VITALS
OXYGEN SATURATION: 95 % | RESPIRATION RATE: 18 BRPM | DIASTOLIC BLOOD PRESSURE: 54 MMHG | SYSTOLIC BLOOD PRESSURE: 138 MMHG | HEART RATE: 54 BPM

## 2024-08-20 PROCEDURE — G0300 HHS/HOSPICE OF LPN EA 15 MIN: HCPCS

## 2024-08-20 PROCEDURE — G0151 HHCP-SERV OF PT,EA 15 MIN: HCPCS

## 2024-08-20 NOTE — PROGRESS NOTES
Cardiology Follow Up    Oliva Dc  3/8/1924  [id number]  [facility]  [address]  [place] [id number] [phone]    1. Atrial flutter, unspecified type (HCC)  POCT ECG      2. Hypertension, unspecified type        3. Post-nasal drip        4. Closed fracture of seventh thoracic vertebra, unspecified fracture morphology, initial encounter ([facility])        5. Closed fracture of ninth thoracic vertebra, unspecified fracture morphology, initial encounter ([facility])            Interval History:   Ms Oliva Dc was admitted to [facility] on 8/11 - 8/15/2  with closed fracture of the ninth thoracic vertebrae.  She presented to the emergency room with a fall.  She fell 1 month prior.  She woke up in the morning to use the bathroom felt dizzy unsteady resulting in a fall.  She admits to poor oral intake in the past few weeks.  Trauma workup showed subacute T7 fracture and indeterminate T9 fracture no intervention done.  Neurosurgery recommended TLSO brace.  On presentation in normal sinus rhythm.  She was found to be in atrial flutter in the setting of fall, volume depletion, TAMEKA, and UTI.  Cardiology consulted, Toprol succinate started.  No anticoagulation in the setting of gait instability and falls.  UTI treated with Rocephin.     Oliva presents to our office  for a recent hospitalization follow-up visit.  She is accompanied by her grandson who left the office this is sitting in the waiting room.  Oliva sitting in a wheelchair.  Her chief complaint is increased mucus production in the back of her throat.  She denies chest pain palpitations lightheadedness or dizziness.    Medical History   Primary Cardiologist Dr Bradley  Hypertension  Ambulatory dysfunction  Patient Active Problem List   Diagnosis    Closed fracture of ninth thoracic vertebra (HCC)    Multiple falls    Ambulatory dysfunction    Dementia  without behavioral disturbance (HCC)    Hypertension    Chronic diarrhea    At risk for delirium    Hiatal hernia    Dysphagia    SIRS (systemic inflammatory response syndrome) (HCC)    Vitamin D insufficiency    Atrial flutter (HCC)     No past medical history on file.  Social History     Socioeconomic History    Marital status: Unknown     Spouse name: Not on file    Number of children: Not on file    Years of education: Not on file    Highest education level: Not on file   Occupational History    Not on file   Tobacco Use    Smoking status: Not on file    Smokeless tobacco: Not on file   Substance and Sexual Activity    Alcohol use: Not on file    Drug use: Not on file    Sexual activity: Not on file   Other Topics Concern    Not on file   Social History Narrative    Not on file     Social Determinants of Health     Financial Resource Strain: Low Risk  (6/12/2023)    Received from [facility]    Overall Financial Resource Strain (CARDIA)     Difficulty of Paying Living Expenses: Not hard at all   Food Insecurity: No Food Insecurity (8/11/2024)    Hunger Vital Sign     Worried About Running Out of Food in the Last Year: Never true     Ran Out of Food in the Last Year: Never true   Transportation Needs: No Transportation Needs (8/11/2024)    PRAPARE - Transportation     Lack of Transportation (Medical): No     Lack of Transportation (Non-Medical): No   Physical Activity: Not on file   Stress: No Stress Concern Present (6/12/2023)    Received from [facility]    [place] [employer] - Occupational Stress Questionnaire     Feeling of Stress : Not at all   Social Connections: Socially Isolated (6/12/2023)    Received from [facility]    Social Connection and Isolation Panel [NHANES]     Frequency of Communication with Friends and Family: Twice a week     Frequency of Social Gatherings with Friends and Family: Once a week     Attends Druze  Services: Never     Active Member of Clubs or Organizations: No     Attends Club or Organization Meetings: Never     Marital Status:    Intimate Partner Violence: Patient Declined (6/12/2023)    Received from [facility]    Humiliation, Afraid, Rape, and Kick questionnaire     Fear of Current or Ex-Partner: Patient declined     Emotionally Abused: Patient declined     Physically Abused: Patient declined     Sexually Abused: Patient declined   Housing Stability: Unknown (8/11/2024)    Housing Stability Vital Sign     Unable to Pay for Housing in the Last Year: No     Number of Times Moved in the Last Year: Not on file     Homeless in the Last Year: No      No family history on file.  No past surgical history on file.    Current Outpatient Medications:     acetaminophen (TYLENOL) 500 mg tablet, Take 1 tablet (500 mg total) by mouth every 6 (six) hours as needed for moderate pain, Disp: 30 tablet, Rfl: 0    aluminum-magnesium hydroxide-simethicone (MAALOX) 2324-3180-011 mg/30 mL suspension, Take 30 mL by mouth every 4 (four) hours as needed for indigestion or heartburn for up to 5 days, Disp: 355 mL, Rfl: 0    amLODIPine (NORVASC) 2.5 mg tablet, Take 2.5 mg by mouth daily, Disp: , Rfl:     donepezil (ARICEPT) 10 mg tablet, TAKE ONE TABLET BY MOUTH NIGHTLY (DEMENTIA).., Disp: , Rfl:     furosemide (LASIX) 20 mg tablet, TAKE ONE TABLET BY MOUTH EVERY DAY (EDEMA), Disp: , Rfl:     gabapentin (NEURONTIN) 100 mg capsule, Take 1 capsule (100 mg total) by mouth daily at bedtime for 7 days, Disp: 7 capsule, Rfl: 0    lidocaine (LIDODERM) 5 %, Apply 1 patch topically over 12 hours daily for 5 days Remove & Discard patch within 12 hours or as directed by MD (Patient taking differently: Apply 1 patch topically daily. LOW BACK. Remove & Discard patch within 12 hours or as directed by MD), Disp: 5 patch, Rfl: 0    metoprolol succinate (TOPROL-XL) 25 mg 24 hr tablet, Take 1 tablet (25 mg total) by mouth  daily for 14 days, Disp: 14 tablet, Rfl: 0    mirtazapine (REMERON) 7.5 MG tablet, TAKE ONE TABLET BY MOUTH NIGHTLY (INSOMNIA), Disp: , Rfl:     nystatin (MYCOSTATIN) powder, Apply topically 2 (two) times a day for 14 days (Patient taking differently: Apply 1 Application topically 2 (two) times a day. UNDER BREASTS.  100,000 UNITS/GM.), Disp: 30 g, Rfl: 0    sertraline (ZOLOFT) 50 mg tablet, TAKE ONE TABLET BY MOUTH EVERY DAY (DEPRESSION), Disp: , Rfl:   Allergies   Allergen Reactions    Penicillins     Sulfa Antibiotics Other (See Comments)       Labs:  No results displayed because visit has over 200 results.        Imaging: XR chest portable    Result Date: 8/13/2024  Narrative: XR CHEST PORTABLE INDICATION: chest pain. Fell 5 days ago. Patient with several night vomiting with dark vomit, patient is now again and complains of left-sided rib, back, and chest pain. COMPARISON: 9/1/2019 chest radiograph, CT chest abdomen pelvis 8/11/2024 FINDINGS: Trace fluid tracking along the horizontal fissure, adjacent subsegmental atelectasis of the right middle lobe. Blunting in the lateral right costophrenic angle. No focal consolidation. No pneumothorax or pleural effusion. Unremarkable cardiomediastinal silhouette.  Atherosclerotic vascular calcifications noted. Age-related degenerative changes in the spine. No displaced rib fractures. Bony deformity related to chronic right lower rib fractures. Enteric contrast visualized in the stomach and proximal duodenal segments.     Impression: No focal consolidation, pneumothorax, or pleural effusion. Subsegmental atelectasis of the right middle lobe with trace fluid tracking along the horizontal fissure. No acute displaced rib fractures identified. Resident: Rafael Merida I, the attending radiologist, have reviewed the images and agree with the final report above. Workstation performed: [id number]     FL barium swallow video w speech    Result Date: 8/13/2024  Narrative: POORNIMA  video barium swallow study was performed by the Department of Speech Pathology. Please refer to the report for the official interpretation. The images are stored for archival purposes only. Study images were not formally reviewed by the Radiology Department.    XR spine thoracic 3 vw    Result Date: 8/12/2024  Narrative: XR SPINE THORACIC 3 VW INDICATION: T9 compression. COMPARISON: CT chest abdomen pelvis 1124 FINDINGS: Unchanged T7 and T9 compression fractures. Intact pedicles. Stable alignment. Age-related degenerative changes are seen. No displacement of the paraspinal line. Lungs are clear. Right apical pleural calcifications. Atherosclerotic calcification of the aorta.     Impression: Unchanged T7 and T9 compression fractures. Stable alignment. Workstation performed: [id number]     XR tibia fibula 2 views RIGHT    Result Date: 8/11/2024  Narrative: XR TIBIA FIBULA 2 VW RIGHT INDICATION: Bruising. COMPARISON: None FINDINGS: No acute fracture or dislocation. No significant degenerative changes. No lytic or blastic osseous lesion. Unremarkable soft tissues.     Impression: No acute osseous abnormality. Computerized Assisted Algorithm (CAA) may have been used to analyze all applicable images. Workstation performed: [id number]     CT chest abdomen pelvis wo contrast    Result Date: 8/11/2024  Narrative: CT CHEST, ABDOMEN AND PELVIS WITHOUT IV CONTRAST INDICATION: Fall. EPigastric abdominal pain with guarding. Lumber midline tenderness after fall. Pain over multiple rib spaces. COMPARISON: CT abdomen dated 9/1/2019. TECHNIQUE: CT examination of the chest, abdomen and pelvis was performed without intravenous contrast. Multiplanar 2D reformatted images were created from the source data. This examination, like all CT scans performed in the [facility], was performed utilizing techniques to minimize radiation dose exposure, including the use of iterative reconstruction and automated exposure control.  Radiation dose length product (DLP) for this visit: 326 mGy-cm Enteric Contrast: Not administered. FINDINGS: CHEST LUNGS: Mild basilar subsegmental atelectasis. Subsegmental atelectasis in the right middle lobe and possible small amount of fluid in the horizontal fissure... PLEURA: Trace right effusion. No pneumothorax. HEART/GREAT VESSELS: Coronary artery calcifications. No thoracic aortic aneurysm. MEDIASTINUM AND OMAR: Unremarkable. CHEST WALL AND LOWER NECK: Unremarkable. ABDOMEN LIVER/BILIARY TREE: Stable subcentimeter hypodensity in the left lobe of the liver that is too small to characterize but statistically benign. No new liver lesions. GALLBLADDER: No calcified gallstones. No pericholecystic inflammatory change. SPLEEN: Unremarkable. PANCREAS: Infiltrative changes surrounding the head of the pancreas and adjacent duodenum may represent pancreatitis. Duodenitis in the differential but less likely. Recommend correlate with lipase. ADRENAL GLANDS: 1.4 cm left adrenal nodule is nonsignificantly changed allowing for limitations due to motion on prior study. Favor adenoma. KIDNEYS/URETERS: Left renal cyst is decreased in size. No obstructing stone or hydronephrosis. STOMACH AND BOWEL: Small hiatal hernia. Diverticulosis without diverticulitis. No obstruction. APPENDIX: No findings to suggest appendicitis. ABDOMINOPELVIC CAVITY: No ascites. No pneumoperitoneum. No lymphadenopathy. VESSELS: Unremarkable for patient's age. PELVIS REPRODUCTIVE ORGANS: Status post hysterectomy. URINARY BLADDER: Unremarkable. ABDOMINAL WALL/INGUINAL REGIONS: Unremarkable. BONES: There is a transitional L5 vertebra. Moderate compression fracture at T7 is favored to be acute or possibly subacute. Minimal bony retropulsion without significant canal stenosis. No extension into the posterior elements. Age-indeterminate moderate superior endplate compression fracture at T9. Chronic left 10th and 11th rib fractures.     Impression:  Transitional, sacralized L5 vertebra Moderate T9 superior plate compression fracture that could be acute or possibly subacute. Minimal bone anteropulsion without significant canal stenosis Age-indeterminate mild superior plate compression fracture at T9. Infiltrative changes surrounding the head of the pancreas concerning for pancreatitis. Correlate with lipase. No other acute intrathoracic or intra-abdominal pathology. I personally discussed this study with ALENA SANCHEZ on 8/11/2024 10:26 AM. Workstation performed: [id number]     CT head without contrast    Result Date: 8/11/2024  Narrative: CT BRAIN - WITHOUT CONTRAST INDICATION:   Fall, HS, vomiting. COMPARISON: CT dated 9/1/2019 TECHNIQUE:  CT examination of the brain was performed.  Multiplanar 2D reformatted images were created from the source data. Radiation dose length product (DLP) for this visit:  984 mGy-cm .  This examination, like all CT scans performed in the [facility], was performed utilizing techniques to minimize radiation dose exposure, including the use of iterative reconstruction and automated exposure control. IMAGE QUALITY:  Diagnostic. FINDINGS: PARENCHYMA:  No intracranial mass, mass effect or midline shift. No CT signs of acute infarction.  No acute parenchymal hemorrhage. Stable diminished attenuation in the periventricular and subcortical matter likely due to moderate chronic microangiopathy. VENTRICLES AND EXTRA-AXIAL SPACES:  Normal for the patient's age. VISUALIZED ORBITS: Bilateral lens replacement. PARANASAL SINUSES: Mild maxillary sinus mucosal thickening. CALVARIUM AND EXTRACRANIAL SOFT TISSUES:  Normal.     Impression: No acute intracranial abnormality. Stable chronic microangiopathy. Workstation performed: [id number]     CT spine cervical without contrast    Result Date: 8/11/2024  Narrative: CT CERVICAL SPINE - WITHOUT CONTRAST INDICATION:   Fall, HS >65. COMPARISON: CT dated 9/1/2019. TECHNIQUE:  CT  examination of the cervical spine was performed without intravenous contrast.  Contiguous axial images were obtained. Multiplanar 2D reformatted images were created from the source data. Radiation dose length product (DLP) for this visit:  205 mGy-cm .  This examination, like all CT scans performed in the [facility], was performed utilizing techniques to minimize radiation dose exposure, including the use of iterative reconstruction and automated exposure control. IMAGE QUALITY:  Diagnostic. FINDINGS: ALIGNMENT: Straightening. No subluxation. VERTEBRAE:  No fracture. DEGENERATIVE CHANGES: Tiny disc osteophyte complexes that mildly indents the thecal sac without critical canal stenosis. Multilevel facet and uncovertebral hypertrophy with moderate left foraminal stenosis at C3-4 and milder foraminal stenosis at other levels. PREVERTEBRAL AND PARASPINAL SOFT TISSUES: Unremarkable THORACIC INLET: Biapical scarring     Impression: No cervical spine fracture or traumatic malalignment. Workstation performed: [id number]       Review of Systems:  Review of Systems   HENT:  Positive for postnasal drip.         Increased mucous production with post nasal drip sitting in the back of her throat causing her to cough out sputum.     Musculoskeletal:  Positive for arthralgias, gait problem and myalgias.   All other systems reviewed and are negative.      Physical Exam:  Physical Exam  Vitals reviewed.   Constitutional:       Appearance: Normal appearance.      Comments: Elderly female sitting in a wheelchair   Cardiovascular:      Rate and Rhythm: Regular rhythm. Bradycardia present.      Pulses: Normal pulses.      Heart sounds: Normal heart sounds.   Pulmonary:      Effort: Pulmonary effort is normal.      Breath sounds: Normal breath sounds.   Musculoskeletal:         General: Normal range of motion.      Right lower leg: No edema.      Left lower leg: No edema.   Skin:     General: Skin is warm and dry.       Capillary Refill: Capillary refill takes less than 2 seconds.   Neurological:      General: No focal deficit present.      Mental Status: She is alert and oriented to person, place, and time.   Psychiatric:         Mood and Affect: Mood normal.         Behavior: Behavior normal.         Discussion/Summary:  # Paroxysmal atrial fibrillation [id number]= 4 not on AC due to fall risk. EKG in the office Sinus Bradycardia 56 BPM.  Continue on metoprolol succinate 25 mg daily.  # Hypertension RUE sitting 110/60 continue on Toprol succinate 25 mg daily, continue on Lasix 20 mg daily  # Post nasal drip instructed to take OTC Claritin 10mg daily PRN  for post nasal drip.    # Subacute T7 fracture and indeterminate T9 fracture no intervention done.not wearing TLSO brace, reasons unknown,  Denies Back pain.

## 2024-08-21 ENCOUNTER — OFFICE VISIT (OUTPATIENT)
Dept: CARDIOLOGY CLINIC | Facility: CLINIC | Age: 89
End: 2024-08-21
Payer: COMMERCIAL

## 2024-08-21 VITALS
BODY MASS INDEX: 20.04 KG/M2 | WEIGHT: 102.1 LBS | HEART RATE: 56 BPM | SYSTOLIC BLOOD PRESSURE: 142 MMHG | OXYGEN SATURATION: 94 % | HEIGHT: 60 IN | DIASTOLIC BLOOD PRESSURE: 56 MMHG

## 2024-08-21 DIAGNOSIS — R09.82 POST-NASAL DRIP: ICD-10-CM

## 2024-08-21 DIAGNOSIS — S22.069A CLOSED FRACTURE OF SEVENTH THORACIC VERTEBRA, UNSPECIFIED FRACTURE MORPHOLOGY, INITIAL ENCOUNTER (HCC): ICD-10-CM

## 2024-08-21 DIAGNOSIS — I48.92 ATRIAL FLUTTER, UNSPECIFIED TYPE (HCC): Primary | ICD-10-CM

## 2024-08-21 DIAGNOSIS — I10 HYPERTENSION, UNSPECIFIED TYPE: ICD-10-CM

## 2024-08-21 DIAGNOSIS — S22.079A CLOSED FRACTURE OF NINTH THORACIC VERTEBRA, UNSPECIFIED FRACTURE MORPHOLOGY, INITIAL ENCOUNTER (HCC): ICD-10-CM

## 2024-08-21 PROCEDURE — 93000 ELECTROCARDIOGRAM COMPLETE: CPT | Performed by: NURSE PRACTITIONER

## 2024-08-21 PROCEDURE — 99215 OFFICE O/P EST HI 40 MIN: CPT | Performed by: NURSE PRACTITIONER

## 2024-08-21 RX ORDER — LORATADINE 10 MG/1
TABLET ORAL
Qty: 30 TABLET | Refills: 1
Start: 2024-08-21

## 2024-08-21 NOTE — PATIENT INSTRUCTIONS
Take over the counter Claritin 10mg daily when needed for post nasal drip and increased mucous production

## 2024-08-22 ENCOUNTER — HOME CARE VISIT (OUTPATIENT)
Dept: HOME HEALTH SERVICES | Facility: HOME HEALTHCARE | Age: 89
End: 2024-08-22
Payer: COMMERCIAL

## 2024-08-22 NOTE — CASE COMMUNICATION
Family requested therapy be canceled for this date secondary to pt feeling sick. Supervising physical therapist will reach out to pt in order to schedule NV.

## 2024-08-23 ENCOUNTER — HOME CARE VISIT (OUTPATIENT)
Dept: HOME HEALTH SERVICES | Facility: HOME HEALTHCARE | Age: 89
End: 2024-08-23
Payer: COMMERCIAL

## 2024-08-23 VITALS
RESPIRATION RATE: 18 BRPM | HEART RATE: 62 BPM | OXYGEN SATURATION: 95 % | TEMPERATURE: 98 F | SYSTOLIC BLOOD PRESSURE: 130 MMHG | DIASTOLIC BLOOD PRESSURE: 60 MMHG

## 2024-08-23 DIAGNOSIS — R13.10 DYSPHAGIA, UNSPECIFIED TYPE: Primary | ICD-10-CM

## 2024-08-23 PROCEDURE — G0300 HHS/HOSPICE OF LPN EA 15 MIN: HCPCS

## 2024-08-26 ENCOUNTER — HOME CARE VISIT (OUTPATIENT)
Dept: HOME HEALTH SERVICES | Facility: HOME HEALTHCARE | Age: 89
End: 2024-08-26
Payer: COMMERCIAL

## 2024-08-26 VITALS
RESPIRATION RATE: 18 BRPM | SYSTOLIC BLOOD PRESSURE: 148 MMHG | DIASTOLIC BLOOD PRESSURE: 74 MMHG | HEART RATE: 74 BPM | OXYGEN SATURATION: 95 %

## 2024-08-26 PROCEDURE — G0299 HHS/HOSPICE OF RN EA 15 MIN: HCPCS

## 2024-08-26 NOTE — CASE COMMUNICATION
Patient discharged from nursing services at this time, no skilled nursing need. Patient will continue with PT, OT and SLP

## 2024-08-27 ENCOUNTER — HOME CARE VISIT (OUTPATIENT)
Dept: HOME HEALTH SERVICES | Facility: HOME HEALTHCARE | Age: 89
End: 2024-08-27
Payer: COMMERCIAL

## 2024-08-27 VITALS — DIASTOLIC BLOOD PRESSURE: 72 MMHG | SYSTOLIC BLOOD PRESSURE: 150 MMHG | HEART RATE: 96 BPM | OXYGEN SATURATION: 95 %

## 2024-08-27 PROCEDURE — G0152 HHCP-SERV OF OT,EA 15 MIN: HCPCS

## 2024-08-28 ENCOUNTER — HOME CARE VISIT (OUTPATIENT)
Dept: HOME HEALTH SERVICES | Facility: HOME HEALTHCARE | Age: 89
End: 2024-08-28
Payer: COMMERCIAL

## 2024-08-28 VITALS
SYSTOLIC BLOOD PRESSURE: 134 MMHG | DIASTOLIC BLOOD PRESSURE: 62 MMHG | OXYGEN SATURATION: 94 % | RESPIRATION RATE: 20 BRPM | HEART RATE: 86 BPM

## 2024-08-28 PROCEDURE — G0153 HHCP-SVS OF S/L PATH,EA 15MN: HCPCS

## 2024-08-28 PROCEDURE — G0151 HHCP-SERV OF PT,EA 15 MIN: HCPCS

## 2024-08-29 ENCOUNTER — HOME CARE VISIT (OUTPATIENT)
Dept: HOME HEALTH SERVICES | Facility: HOME HEALTHCARE | Age: 89
End: 2024-08-29
Payer: COMMERCIAL

## 2024-08-29 VITALS — OXYGEN SATURATION: 97 % | HEART RATE: 96 BPM | DIASTOLIC BLOOD PRESSURE: 65 MMHG | SYSTOLIC BLOOD PRESSURE: 145 MMHG

## 2024-08-30 ENCOUNTER — HOME CARE VISIT (OUTPATIENT)
Dept: HOME HEALTH SERVICES | Facility: HOME HEALTHCARE | Age: 89
End: 2024-08-30
Payer: COMMERCIAL

## 2024-08-30 PROCEDURE — G0151 HHCP-SERV OF PT,EA 15 MIN: HCPCS

## 2024-08-30 NOTE — CASE COMMUNICATION
This message is informative only.  No action required.      ST Mosher made 8.28.24    Impression.  Limited po feedings observed due to Pt. reportedly just finishing eating small amount of rice and beans.  Mild oral stage dysphagia characterized by mildly labored mastication on soft foods. .   No s s of aspiration observed when eating rice and beans but did observe belching.  Possible esophageal stage dysphagia.   WFL swallow observed on  thin liquids.   Daughter reports that Pt does have reflux. and a hypersensative gag reflex.  Oriented x3.  Daughter and Pt denied any difficulty  with memory.  Observed speech and language to be WFL.   [place].  Vision-- Pt reportedly needs glasses to read.      Rec.   Sp tx 1x1, 2x 1, 1x1  Cont informal eval   Cont on soft, moist foods as tolerated w compensatory techniques-- small bites slowly w chin tuck before swallow w wash tech.   Co nt on thin liquids  Moniter weight as needed--Pt reported that she currently weighs approx. 100 lbs and has lost about 10 lbs. since fall  Mouth wash at least 2x per day w toothettes   Aspiration Precautions--moniter temp and lung condition.  Contact  if change in either one  Reflux Precautions  ex. eat 5 to 6 small meals per day vs. 3 large meals.   moniter which foods cause reflux   Cont to take maalox as recommended by [facility] aff reportedly.  Note. Hospital Staff recommended that Pt take 30 ml malox before she eats.   Daughter reports that she gags on that and only gives her 10 ml Maalox. and she does not gag on that   Consider GI Consult

## 2024-08-31 VITALS
HEART RATE: 96 BPM | SYSTOLIC BLOOD PRESSURE: 118 MMHG | RESPIRATION RATE: 20 BRPM | DIASTOLIC BLOOD PRESSURE: 52 MMHG | OXYGEN SATURATION: 96 %

## 2024-09-03 ENCOUNTER — HOME CARE VISIT (OUTPATIENT)
Dept: HOME HEALTH SERVICES | Facility: HOME HEALTHCARE | Age: 89
End: 2024-09-03
Payer: COMMERCIAL

## 2024-09-03 VITALS — DIASTOLIC BLOOD PRESSURE: 68 MMHG | OXYGEN SATURATION: 97 % | HEART RATE: 83 BPM | SYSTOLIC BLOOD PRESSURE: 130 MMHG

## 2024-09-03 VITALS — SYSTOLIC BLOOD PRESSURE: 130 MMHG | OXYGEN SATURATION: 98 % | DIASTOLIC BLOOD PRESSURE: 68 MMHG | HEART RATE: 83 BPM

## 2024-09-03 PROCEDURE — G0153 HHCP-SVS OF S/L PATH,EA 15MN: HCPCS

## 2024-09-03 PROCEDURE — G0152 HHCP-SERV OF OT,EA 15 MIN: HCPCS

## 2024-09-04 ENCOUNTER — HOME CARE VISIT (OUTPATIENT)
Dept: HOME HEALTH SERVICES | Facility: HOME HEALTHCARE | Age: 89
End: 2024-09-04
Payer: COMMERCIAL

## 2024-09-04 VITALS
SYSTOLIC BLOOD PRESSURE: 116 MMHG | OXYGEN SATURATION: 98 % | RESPIRATION RATE: 22 BRPM | HEART RATE: 78 BPM | DIASTOLIC BLOOD PRESSURE: 64 MMHG

## 2024-09-04 VITALS — TEMPERATURE: 98.1 F | OXYGEN SATURATION: 97 % | HEART RATE: 85 BPM

## 2024-09-04 PROCEDURE — G0151 HHCP-SERV OF PT,EA 15 MIN: HCPCS

## 2024-09-05 ENCOUNTER — HOME CARE VISIT (OUTPATIENT)
Dept: HOME HEALTH SERVICES | Facility: HOME HEALTHCARE | Age: 89
End: 2024-09-05
Payer: COMMERCIAL

## 2024-09-05 VITALS — SYSTOLIC BLOOD PRESSURE: 110 MMHG | HEART RATE: 97 BPM | DIASTOLIC BLOOD PRESSURE: 62 MMHG | OXYGEN SATURATION: 96 %

## 2024-09-05 PROCEDURE — G0158 HHC OT ASSISTANT EA 15: HCPCS

## 2024-09-05 NOTE — Clinical Note
"PT will contact PCP office to request MSW referral   Pts PCP office is with [facility]   ----- Message -----  From: ANUSHA Leonard  Sent: 9/5/2024  11:17 AM EDT  To: Radha Atkins, PT; Kathy Thibodeaux, SLP; *      Pt demonstrating minimal participation in OT therapy session this date. Pt states \"I am tired\" and requests to return to bed. PC to grddtr c grddtr stating that pt's 9/3 PCP appt was cancelled due to dtr stating that pt was too fatigued to go to appt. Grddtr states she will call to reschedule PCP appt today. Grddtr states pt is requiring incr level of assist c ADLs and that dtr is unable to assist pt fully. Therapist discussed palliative and hospice care c grddtr and recommends an MSW visit to discuss further. Grddtr and dtr in agreement that they would like to discuss care c an MSW. MEZA requesting that one of the therapists place an order for an MSW visit."

## 2024-09-05 NOTE — CASE COMMUNICATION
"Pt demonstrating minimal participation in OT therapy session this date. Pt states \"I am tired\" and requests to return to bed. PC to grddtr c grddtr stating that pt's 9/3 PCP appt was cancelled due to dtr stating that pt was too fatigued to go to appt. Grddtr states she will call to reschedule PCP appt today. Grddtr states pt is requiring incr level of assist c ADLs and that dtr is unable to assist pt fully. Therapist discussed palliativ e and hospice care c grddtr and recommends an MSW visit to discuss further. Grddtr and dtr in agreement that they would like to discuss care c an MSW. MEZA requesting that one of the therapists place an order for an MSW visit."

## 2024-09-06 ENCOUNTER — HOME CARE VISIT (OUTPATIENT)
Dept: HOME HEALTH SERVICES | Facility: HOME HEALTHCARE | Age: 89
End: 2024-09-06
Payer: COMMERCIAL

## 2024-09-06 NOTE — CASE COMMUNICATION
TC Spoke with Lucy at [facility]  Pt has an appt with Garo Braden PA-C ,  but not until 9/23 ...  Explained situation of pts declining functional mobility and overall health issues.    She will give msg to him as he is still in the office today.    PT is requesting an MSW referal ... to assess for possible home hospice.

## 2024-09-07 ENCOUNTER — HOME CARE VISIT (OUTPATIENT)
Dept: HOME HEALTH SERVICES | Facility: HOME HEALTHCARE | Age: 89
End: 2024-09-07
Payer: COMMERCIAL

## 2024-09-07 NOTE — CASE COMMUNICATION
This message is informative only.  No action required.     Received text from LIZ stating that Pt ate waffles this am without difficulty and sp tx was not needed today.  Discussed that this SLP would follow up 1x next week to make sure no swallowing issues before dc.   GD agreed.     Note. Pt seen 1x this week vs. 2x as planned.

## 2024-09-09 ENCOUNTER — HOME CARE VISIT (OUTPATIENT)
Dept: HOME HEALTH SERVICES | Facility: HOME HEALTHCARE | Age: 89
End: 2024-09-09
Payer: COMMERCIAL

## 2024-09-09 VITALS — DIASTOLIC BLOOD PRESSURE: 70 MMHG | OXYGEN SATURATION: 99 % | HEART RATE: 89 BPM | SYSTOLIC BLOOD PRESSURE: 120 MMHG

## 2024-09-09 PROCEDURE — G0152 HHCP-SERV OF OT,EA 15 MIN: HCPCS

## 2024-09-10 ENCOUNTER — HOME CARE VISIT (OUTPATIENT)
Dept: HOME HEALTH SERVICES | Facility: HOME HEALTHCARE | Age: 89
End: 2024-09-10
Payer: COMMERCIAL

## 2024-09-10 VITALS
DIASTOLIC BLOOD PRESSURE: 62 MMHG | SYSTOLIC BLOOD PRESSURE: 98 MMHG | RESPIRATION RATE: 20 BRPM | OXYGEN SATURATION: 93 % | HEART RATE: 76 BPM

## 2024-09-10 PROCEDURE — G0151 HHCP-SERV OF PT,EA 15 MIN: HCPCS

## 2024-09-12 ENCOUNTER — HOME CARE VISIT (OUTPATIENT)
Dept: HOME HEALTH SERVICES | Facility: HOME HEALTHCARE | Age: 89
End: 2024-09-12
Payer: COMMERCIAL

## 2024-09-12 ENCOUNTER — HOME CARE VISIT (OUTPATIENT)
Dept: HOME HEALTH SERVICES | Facility: HOME HEALTHCARE | Age: 89
End: 2024-09-12
Payer: MEDICARE

## 2024-09-12 ENCOUNTER — HOSPICE ADMISSION (OUTPATIENT)
Dept: HOME HOSPICE | Facility: HOSPICE | Age: 89
End: 2024-09-12
Payer: MEDICARE

## 2024-09-12 PROCEDURE — G0155 HHCP-SVS OF CSW,EA 15 MIN: HCPCS

## 2024-09-13 ENCOUNTER — HOME CARE VISIT (OUTPATIENT)
Dept: HOME HEALTH SERVICES | Facility: HOME HEALTHCARE | Age: 89
End: 2024-09-13
Payer: COMMERCIAL

## 2024-09-13 NOTE — CASE COMMUNICATION
I am in the home of Oliva Dc  [birth year] 89 yo female. Hx Dementia. Dysphagia, ambulatory dysfunction, multiple falls. Recent hospital stay S/P was found to have SIRS, UTI, sob, and had fxd 9th vertebrae. She is weak, poor appetite, her Dementia isn't too bad she is forgetful but oriented to person place. Feeds self but is mainly in bed. Transfer from bed to [facility]. She is very thin, sunken eye orbits and clavicle, no muscle mass. Poo r appetite. PPS 30 to 40....she looks worse in person than on paper. Approve RLOC?     Approved for RLOC by Dr NICK Do .24   dxHypertensive Heart and CKD

## 2024-09-13 NOTE — CASE COMMUNICATION
This message is informative only.  No action required.      St visit planned 9.13.24.    GD texted this SLP stating that Hospice was going to evaluate Pt 9.13.24 at 11 am.      Pt not seen 1x this week for sp tx as planned.    Text DC.  LIZ in agreement

## 2024-09-14 ENCOUNTER — HOME CARE VISIT (OUTPATIENT)
Dept: HOME HOSPICE | Facility: HOSPICE | Age: 89
End: 2024-09-14
Payer: MEDICARE

## 2024-09-14 ENCOUNTER — HOME CARE VISIT (OUTPATIENT)
Dept: HOME HEALTH SERVICES | Facility: HOME HEALTHCARE | Age: 89
End: 2024-09-14
Payer: MEDICARE

## 2024-09-14 PROCEDURE — G0299 HHS/HOSPICE OF RN EA 15 MIN: HCPCS

## 2024-09-14 PROCEDURE — 10330057 MEDICATION, GENERAL

## 2024-09-15 ENCOUNTER — HOME CARE VISIT (OUTPATIENT)
Dept: HOME HOSPICE | Facility: HOSPICE | Age: 89
End: 2024-09-15
Payer: MEDICARE

## 2024-09-15 ENCOUNTER — HOME CARE VISIT (OUTPATIENT)
Dept: HOME HEALTH SERVICES | Facility: HOME HEALTHCARE | Age: 89
End: 2024-09-15
Payer: MEDICARE

## 2024-09-15 PROCEDURE — G0156 HHCP-SVS OF AIDE,EA 15 MIN: HCPCS

## 2024-09-16 ENCOUNTER — HOME CARE VISIT (OUTPATIENT)
Dept: HOME HEALTH SERVICES | Facility: HOME HEALTHCARE | Age: 89
End: 2024-09-16

## 2024-09-16 ENCOUNTER — TELEPHONE (OUTPATIENT)
Dept: HOME HEALTH SERVICES | Facility: HOME HEALTHCARE | Age: 89
End: 2024-09-16

## 2024-09-16 ENCOUNTER — HOME CARE VISIT (OUTPATIENT)
Dept: HOME HOSPICE | Facility: HOSPICE | Age: 89
End: 2024-09-16
Payer: MEDICARE

## 2024-09-16 PROCEDURE — 10330064 UNDERPAD, REUSE 36X36 1DZ     BECKCL

## 2024-09-16 PROCEDURE — 10330064 BRIEF, TAB CLSR ULTRA MED 32-44 (16/BG 6

## 2024-09-17 ENCOUNTER — HOME CARE VISIT (OUTPATIENT)
Dept: HOME HOSPICE | Facility: HOSPICE | Age: 89
End: 2024-09-17
Payer: MEDICARE

## 2024-09-17 ENCOUNTER — TELEPHONE (OUTPATIENT)
Dept: HOME HEALTH SERVICES | Facility: HOME HEALTHCARE | Age: 89
End: 2024-09-17

## 2024-09-18 ENCOUNTER — HOME CARE VISIT (OUTPATIENT)
Dept: HOME HEALTH SERVICES | Facility: HOME HEALTHCARE | Age: 89
End: 2024-09-18
Payer: MEDICARE

## 2024-09-18 ENCOUNTER — HOME CARE VISIT (OUTPATIENT)
Dept: HOME HOSPICE | Facility: HOSPICE | Age: 89
End: 2024-09-18
Payer: MEDICARE

## 2024-09-18 VITALS — SYSTOLIC BLOOD PRESSURE: 112 MMHG | RESPIRATION RATE: 22 BRPM | HEART RATE: 80 BPM | DIASTOLIC BLOOD PRESSURE: 60 MMHG

## 2024-09-18 PROCEDURE — G0299 HHS/HOSPICE OF RN EA 15 MIN: HCPCS

## 2024-09-19 ENCOUNTER — HOME CARE VISIT (OUTPATIENT)
Dept: HOME HEALTH SERVICES | Facility: HOME HEALTHCARE | Age: 89
End: 2024-09-19
Payer: MEDICARE

## 2024-09-19 PROCEDURE — G0156 HHCP-SVS OF AIDE,EA 15 MIN: HCPCS

## 2024-09-21 ENCOUNTER — HOSPITAL ENCOUNTER (INPATIENT)
Facility: HOSPITAL | Age: 89
LOS: 5 days | Discharge: HOSPICE | DRG: 092 | End: 2024-09-26
Attending: EMERGENCY MEDICINE | Admitting: INTERNAL MEDICINE
Payer: COMMERCIAL

## 2024-09-21 ENCOUNTER — HOME CARE VISIT (OUTPATIENT)
Dept: HOME HOSPICE | Facility: HOSPICE | Age: 89
End: 2024-09-21
Payer: MEDICARE

## 2024-09-21 DIAGNOSIS — Z51.5 HOSPICE CARE PATIENT: ICD-10-CM

## 2024-09-21 DIAGNOSIS — F03.90 DEMENTIA WITHOUT BEHAVIORAL DISTURBANCE (HCC): ICD-10-CM

## 2024-09-21 DIAGNOSIS — R53.1 GENERALIZED WEAKNESS: ICD-10-CM

## 2024-09-21 DIAGNOSIS — R26.2 AMBULATORY DYSFUNCTION: Primary | ICD-10-CM

## 2024-09-21 DIAGNOSIS — Z51.5 COMFORT MEASURES ONLY STATUS: ICD-10-CM

## 2024-09-21 LAB
BASOPHILS # BLD AUTO: 0.02 THOUSANDS/ΜL (ref 0–0.1)
BASOPHILS NFR BLD AUTO: 0 % (ref 0–1)
EOSINOPHIL # BLD AUTO: 0.01 THOUSAND/ΜL (ref 0–0.61)
EOSINOPHIL NFR BLD AUTO: 0 % (ref 0–6)
ERYTHROCYTE [DISTWIDTH] IN BLOOD BY AUTOMATED COUNT: 13.8 % (ref 11.6–15.1)
HCT VFR BLD AUTO: 47.4 % (ref 34.8–46.1)
HGB BLD-MCNC: 15.3 G/DL (ref 11.5–15.4)
IMM GRANULOCYTES # BLD AUTO: 0.08 THOUSAND/UL (ref 0–0.2)
IMM GRANULOCYTES NFR BLD AUTO: 1 % (ref 0–2)
LYMPHOCYTES # BLD AUTO: 1.32 THOUSANDS/ΜL (ref 0.6–4.47)
LYMPHOCYTES NFR BLD AUTO: 9 % (ref 14–44)
MCH RBC QN AUTO: 31.6 PG (ref 26.8–34.3)
MCHC RBC AUTO-ENTMCNC: 32.3 G/DL (ref 31.4–37.4)
MCV RBC AUTO: 98 FL (ref 82–98)
MONOCYTES # BLD AUTO: 0.67 THOUSAND/ΜL (ref 0.17–1.22)
MONOCYTES NFR BLD AUTO: 5 % (ref 4–12)
NEUTROPHILS # BLD AUTO: 12.91 THOUSANDS/ΜL (ref 1.85–7.62)
NEUTS SEG NFR BLD AUTO: 85 % (ref 43–75)
NRBC BLD AUTO-RTO: 0 /100 WBCS
PLATELET # BLD AUTO: 340 THOUSANDS/UL (ref 149–390)
PMV BLD AUTO: 10.4 FL (ref 8.9–12.7)
RBC # BLD AUTO: 4.84 MILLION/UL (ref 3.81–5.12)
WBC # BLD AUTO: 15.01 THOUSAND/UL (ref 4.31–10.16)

## 2024-09-21 PROCEDURE — 85025 COMPLETE CBC W/AUTO DIFF WBC: CPT

## 2024-09-21 PROCEDURE — 36415 COLL VENOUS BLD VENIPUNCTURE: CPT

## 2024-09-21 PROCEDURE — 99223 1ST HOSP IP/OBS HIGH 75: CPT | Performed by: INTERNAL MEDICINE

## 2024-09-21 PROCEDURE — 99285 EMERGENCY DEPT VISIT HI MDM: CPT

## 2024-09-21 PROCEDURE — G0299 HHS/HOSPICE OF RN EA 15 MIN: HCPCS

## 2024-09-21 RX ORDER — BISACODYL 10 MG
10 SUPPOSITORY, RECTAL RECTAL DAILY PRN
Status: DISCONTINUED | OUTPATIENT
Start: 2024-09-21 | End: 2024-09-23

## 2024-09-21 RX ORDER — MORPHINE SULFATE 100 MG/5ML
5 SOLUTION, CONCENTRATE ORAL
Status: DISCONTINUED | OUTPATIENT
Start: 2024-09-21 | End: 2024-09-26 | Stop reason: HOSPADM

## 2024-09-21 RX ORDER — HALOPERIDOL 5 MG/ML
0.5 INJECTION INTRAMUSCULAR EVERY 2 HOUR PRN
Status: DISCONTINUED | OUTPATIENT
Start: 2024-09-21 | End: 2024-09-26 | Stop reason: HOSPADM

## 2024-09-21 RX ORDER — ACETAMINOPHEN 650 MG/1
650 SUPPOSITORY RECTAL EVERY 8 HOURS PRN
Status: DISCONTINUED | OUTPATIENT
Start: 2024-09-21 | End: 2024-09-26 | Stop reason: HOSPADM

## 2024-09-21 RX ORDER — SENNOSIDES 8.6 MG
8.6 TABLET ORAL DAILY PRN
Status: DISCONTINUED | OUTPATIENT
Start: 2024-09-21 | End: 2024-09-26 | Stop reason: HOSPADM

## 2024-09-21 RX ORDER — PROCHLORPERAZINE MALEATE 10 MG
10 TABLET ORAL EVERY 6 HOURS PRN
Status: DISCONTINUED | OUTPATIENT
Start: 2024-09-21 | End: 2024-09-26 | Stop reason: HOSPADM

## 2024-09-21 RX ORDER — HALOPERIDOL 2 MG/ML
1 SOLUTION ORAL EVERY 6 HOURS PRN
Status: DISCONTINUED | OUTPATIENT
Start: 2024-09-21 | End: 2024-09-26 | Stop reason: HOSPADM

## 2024-09-21 RX ORDER — LORAZEPAM 2 MG/ML
1 INJECTION INTRAMUSCULAR
Status: DISCONTINUED | OUTPATIENT
Start: 2024-09-21 | End: 2024-09-21

## 2024-09-21 RX ORDER — LORAZEPAM 0.5 MG/1
0.5 TABLET ORAL EVERY 6 HOURS PRN
Status: DISCONTINUED | OUTPATIENT
Start: 2024-09-21 | End: 2024-09-26 | Stop reason: HOSPADM

## 2024-09-21 RX ORDER — MIRTAZAPINE 15 MG/1
7.5 TABLET, FILM COATED ORAL
Status: DISCONTINUED | OUTPATIENT
Start: 2024-09-21 | End: 2024-09-26 | Stop reason: HOSPADM

## 2024-09-21 NOTE — ED ATTENDING ATTESTATION
9/21/2024  I, Michel Frias DO, saw and evaluated the patient. I have discussed the patient with the resident/non-physician practitioner and agree with the resident's/non-physician practitioner's findings, Plan of Care, and MDM as documented in the resident's/non-physician practitioner's note, except where noted. All available labs and Radiology studies were reviewed.  I was present for key portions of any procedure(s) performed by the resident/non-physician practitioner and I was immediately available to provide assistance.       At this point I agree with the current assessment done in the Emergency Department.  I have conducted an independent evaluation of this patient a history and physical is as follows:    Patient is a 89-year-old female with a history of amatory dysfunction, hypertension, mild dementia, known T9 vertebral fracture, brought in by EMS.  The patient is currently on home hospice, EMS indicates that the daughter called today because the patient had 2 episodes where the patient fell, was unable to get up without assistance and the daughter is feeling like the patient requires more assistance than they can provide at home.  The patient herself says that she feels okay, has some mild back pain in her lower thoracic area which is identical to her known vertebral fracture pain which is slowly getting better over time.  She denies headache, no chest pain, shortness of breath, no dysuria, no hematuria, no fever, denies chills, denies abdominal pain.    General:  Patient is well-appearing  Head:  Atraumatic  Eyes:  Conjunctiva pink, PERRL, EOMI  ENT:  Mucous membranes are moist  Neck:  Supple  Cardiac:  S1-S2, without murmurs  Lungs:  Clear to auscultation bilaterally  Abdomen:  Soft, nontender, normal bowel sounds, no CVA tenderness, no tympany, no rigidity, no guarding  Extremities:  Normal range of motion  Neurologic:  Awake, fluent speech, no facial droop, no slurred speech, oriented to person  and place, strength is 3 out of 5 in the bilateral arms, 2 out of 5 in the bilateral legs, normal sensation of the whole body.  Does not seem to understand the commands to perform finger-to-nose or pronator drift testing.  Skin:  Pink warm and dry  Psychiatric:  Alert, pleasant, cooperative    ED Course     Labs Reviewed   CBC AND DIFFERENTIAL - Abnormal       Result Value Ref Range Status    WBC 15.01 (*) 4.31 - 10.16 Thousand/uL Final    RBC 4.84  3.81 - 5.12 Million/uL Final    Hemoglobin 15.3  11.5 - 15.4 g/dL Final    Hematocrit 47.4 (*) 34.8 - 46.1 % Final    MCV 98  82 - 98 fL Final    MCH 31.6  26.8 - 34.3 pg Final    MCHC 32.3  31.4 - 37.4 g/dL Final    RDW 13.8  11.6 - 15.1 % Final    MPV 10.4  8.9 - 12.7 fL Final    Platelets 340  149 - 390 Thousands/uL Final    nRBC 0  /100 WBCs Final    Segmented % 85 (*) 43 - 75 % Final    Immature Grans % 1  0 - 2 % Final    Lymphocytes % 9 (*) 14 - 44 % Final    Monocytes % 5  4 - 12 % Final    Eosinophils Relative 0  0 - 6 % Final    Basophils Relative 0  0 - 1 % Final    Absolute Neutrophils 12.91 (*) 1.85 - 7.62 Thousands/µL Final    Absolute Immature Grans 0.08  0.00 - 0.20 Thousand/uL Final    Absolute Lymphocytes 1.32  0.60 - 4.47 Thousands/µL Final    Absolute Monocytes 0.67  0.17 - 1.22 Thousand/µL Final    Eosinophils Absolute 0.01  0.00 - 0.61 Thousand/µL Final    Basophils Absolute 0.02  0.00 - 0.10 Thousands/µL Final     Patient's family arrived, hospice nurse came down and discussed with the patient and family about goals of care, family desired increased help at home, which is unable to be obtained immediately tonight on a Saturday evening.  Decision was made for admission to a medical service, temporary to revoke hospice, then have case management and social workers work on further resources.    Patient has no focal complaints that are new, no dysuria, no hematuria, patient has no evidence of life-threatening anemia.  I do not believe urinalysis is  indicated.  White count is minimally elevated but this is nonspecific.  Given patient's goals of care with regards to being on hospice, do not believe additional laboratory studies or imaging are indicated.    Case discussed with admitting medicine physician who accepted the patient for admission        MEDICAL DECISION MAKING CODING    COLLECTION AND INTERPRETATION OF DATA  I reviewed prior external notes, including August 2024 hospital discharge summary    I ordered each unique test  Tests reviewed personally by me:  Labs: See above      Critical Care Time  Procedures

## 2024-09-22 PROCEDURE — 99231 SBSQ HOSP IP/OBS SF/LOW 25: CPT | Performed by: PHYSICIAN ASSISTANT

## 2024-09-22 NOTE — CASE MANAGEMENT
Case Management Assessment & Discharge Planning Note    Patient name Oliva Dc  Location [facility] 9[facility]-01 MRN [id number]  : 3/8/1924 Date 2024       Current Admission Date: 2024  Current Admission Diagnosis:Comfort measures only status   Patient Active Problem List    Diagnosis Date Noted Date Diagnosed    Comfort measures only status 2024     Hospice care patient 2024     Vitamin D insufficiency 2024     Atrial flutter (HCC) 2024     Multiple falls 2024     Ambulatory dysfunction 2024     Dementia without behavioral disturbance (HCC) 2024     Hypertension 2024     Chronic diarrhea 2024     At risk for delirium 2024     Hiatal hernia 2024     Dysphagia 2024     SIRS (systemic inflammatory response syndrome) (HCC) 2024     Closed fracture of ninth thoracic vertebra ([facility]) 2024       LOS (days): 1  Geometric Mean LOS (GMLOS) (days):   Days to GMLOS:     OBJECTIVE:    Risk of Unplanned Readmission Score: 10.66         Current admission status: Inpatient  Referral Reason: Other (hospice placement)    Preferred Pharmacy:   [facility] [place], [place] - [address]  [address] [facility] [place] [place] [address]  Phone: [phone] Fax: [phone]    Primary Care Provider: Cortney Sanchez MD    Primary Insurance: [employer] REP  Secondary Insurance:     ASSESSMENT:  Active Health Care Proxies    There are no active Health Care Proxies on file.                 Readmission Root Cause  30 Day Readmission: No    Patient Information  Admitted from:: Home  Mental Status: Confused  During Assessment patient was accompanied by: Not accompanied during assessment  Assessment information provided by:: Other - please comment (Granddaughter-Eulalia)  Primary Caregiver: Family  Caregiver's Name:: Rosa Elena Dc-daughter  Caregiver's Relationship to Patient:: Family Member  Caregiver's Telephone Number::  [phone]  Support Systems: Self, Family members  County of Residence: [place]  What city do you live in?: [place]  Home entry access options. Select all that apply.: No steps to enter home  Type of Current Residence: [facility]  Living Arrangements: Lives w/ Daughter  Is patient a ?: No    Activities of Daily Living Prior to Admission  Functional Status: Total dependent  Completes ADLs independently?: No  Level of ADL dependence: Total Dependent  Ambulates independently?: No  Level of ambulatory dependence: Total Dependent  Does patient use assisted devices?: Yes  Assisted Devices (DME) used: Walker, Bedside Commode  Does patient currently own DME?: Yes  What DME does the patient currently own?: Bedside Commode, Walker  Does patient have a history of Outpatient Therapy (PT/OT)?: No  Does the patient have a history of Short-Term Rehab?: No  Does patient have a history of HHC?: No  Does patient currently have HHC?: No         Patient Information Continued  Income Source: SSI/SSD  Does patient have prescription coverage?: Yes  Does patient receive dialysis treatments?: No  Does patient have a history of substance abuse?: No  Does patient have a history of Mental Health Diagnosis?: No         Means of Transportation  Means of Transport to Appts:: Family transport      Social Determinants of Health (SDOH)      Flowsheet Row Most Recent Value   Housing Stability    In the last 12 months, was there a time when you were not able to pay the mortgage or rent on time? N   In the past 12 months, how many times have you moved where you were living? 0   At any time in the past 12 months, were you homeless or living in a shelter (including now)? N   Transportation Needs    In the past 12 months, has lack of transportation kept you from medical appointments or from getting medications? no   In the past 12 months, has lack of transportation kept you from meetings, work, or from getting things needed for daily living? No    Food Insecurity    Within the past 12 months, you worried that your food would run out before you got the money to buy more. Never true   Within the past 12 months, the food you bought just didn't last and you didn't have money to get more. Never true   Utilities    In the past 12 months has the electric, gas, oil, or water company threatened to shut off services in your home? No            DISCHARGE DETAILS:    Discharge planning discussed with:: Pt's granddaughter Eulalia  Freedom of Choice: Yes  Comments - Freedom of Choice: Pt's granddaughter requesting [facility] for possible facility placement  CM contacted family/caregiver?: Yes  Were Treatment Team discharge recommendations reviewed with patient/caregiver?: Yes  Did patient/caregiver verbalize understanding of patient care needs?: N/A- going to facility  Were patient/caregiver advised of the risks associated with not following Treatment Team discharge recommendations?: Yes    Contacts  Patient Contacts: Eulalia  Relationship to Patient:: Family  Contact Method: Phone  Phone Number: [phone]  Reason/Outcome: Continuity of Care, Emergency Contact, Referral, Discharge Planning    Requested Home Health Care         Is the patient interested in HHC at discharge?: No    DME Referral Provided  Referral made for DME?: No    Other Referral/Resources/Interventions Provided:  Interventions: Facility Return  Referral Comments: Pt's granddaughter considering [facility] for possible placement               CM spoke with pt's granddaughter Eulalia to complete assessment and explain CM role.  Prior to admission pt was residing with daughter Rosa Elena with  Hospice.  Pt was dependent with all ADL's.  Pt has a commode and walker.  Pt has no hx of previous STR or HHC.  Pt has no hx of mental health or substance use hx.  CM spoke with pt's granddaughter regarding discharge planning.  Pt's granddaughter was not aware that room and board would be a private expense under hospice  services.  Pt's granddaughter did ask about additional help at home, CM informed her that private caregivers could be hired as well.  Pt's granddaughter uncertain what to do at this time.  CM discussed sending referral to [facility] and see if they would be able to accommodate or discuss further options with pt's granddaughter, she was in agreement.  Pt's sister resides at [facility] and granddaughter feels if pt would go to a facility, it would be the best option.  CM will continue to follow for discharge planning needs.

## 2024-09-22 NOTE — H&P
H&P - Hospitalist   Name: Oliva Dc 89 y.o. female I MRN: [id number]  Unit/Bed#: ED 28 I Date of Admission: 9/21/2024   Date of Service: 9/21/2024 I Hospital Day: 0     Assessment & Plan  Comfort measures only status  Patient presented to the ER from home where she was on Home Hospice after several falls earlier today and patient's Daughter, Rosa Elena, unable to help assist patient up. [facility]  Patient with hx advanced Dementia  Patient reportedly lives with Daughter, Rosa Elena, at home. Patient's POA though is her Granddaughter, Eulalia, and Eulalia does not live with patient at home but comes over to patient's home as much as possible but has her own family to care for as well.   After speaking with both Granddaughter Eulalia and Daughter Rosa Elena, it appears that Rosa Elena was overwhelmed with situation and called EMS to come help assist with getting patient up from ground and to bring to the hospital. Rosa Elena did report that she was aware that patient's on hospice do not typically get sent to the ER but was overwhelmed with situation   In the ER, patient's Granddaughter, Eulalia, was present and rescinded home hospice and spoke with an RN from patient's hospice agency. Eulalia reports that this was done given that her mother Rosa Elena is unable to help assist with patient's care and with concern that patient will have no care if patient were to go back home.   Eulalia and MARYJANE spoke and reports that she wants her mother to remain on Hospice while in the hospital and Eulalia reports that patient will likely need hospice at a facility given that Rosa Elena is unable to assist and Eulalia has demands of her own family   Eulalia verbalized to me also understanding of hospice and that is focuses soley on patient's symptoms/comfort.   Admit to medicine on Comfort Measures Only Status. Will put on patient's hospice medication regimen from outpatient and if patient needs additional IV pain/anxiety  medications for symptoms will add. Consult Palliative Care as well as Case Management for assistance with need for hospice at a facility. Patient reportedly has a 100+ year old sister who lives at [facility] who Eulalia did report would be ideal if possible for hospice there.   Please reach out to Eulalia, patient's Granddaughter for decisions and reported POA (reported by both eulalia and lesley)  Closed fracture of ninth thoracic vertebra (HCC)  Sustained compression fracture back in August in setting of advanced dementia, recurrent falls   On Comfort Measures  Multiple falls  In setting of advanced dementia     Dementia without behavioral disturbance (HCC)  Hx advanced dementia and dependent with ADLs  Hospice care patient  See comfort measures section above     VTE Pharmacologic Prophylaxis: VTE Score: 3   Code Status: Level 4 - Comfort Care   Discussion with family: Updated  (Granddaughter and daughter) via phone.    Anticipated Length of Stay: Patient will be admitted on an inpatient basis with an anticipated length of stay of greater than 2 midnights secondary to Comfort Measures Only .    History of Present Illness   Chief Complaint: Falls, Hospice     Oliva Dc is a 89 y.o. female with a PMH of Advanced Dementia, recent thoracic vertebral fracture who [facility] on the evening of 9/21 from home hospice where family unable to care for patient. Please see Comfort measures section above for details.  Due to patient's advanced dementia, she is unable to provide history.     Review of Systems   Unable to perform ROS: Dementia       I have reviewed the patient's PMH, PSH, Social History, Family History, Meds, and Allergies  Social History:  Marital Status: Unknown     Objective     Vitals:   Blood Pressure: 99/55 (09/21/24 1705)  Pulse: 97 (09/21/24 1705)  Temperature: 97.6 °F (36.4 °C) (09/21/24 1705)  Respirations: 16 (09/21/24 1705)  SpO2: 98 % (09/21/24 1705)    Physical  Exam  Constitutional:       Appearance: She is not toxic-appearing.   HENT:      Right Ear: External ear normal.      Left Ear: External ear normal.      Mouth/Throat:      Pharynx: Oropharynx is clear.   Eyes:      Extraocular Movements: Extraocular movements intact.   Cardiovascular:      Rate and Rhythm: Tachycardia present.      Heart sounds: No murmur heard.  Pulmonary:      Effort: No respiratory distress.   Musculoskeletal:         General: No swelling.   Skin:     Coloration: Skin is pale.   Neurological:      General: No focal deficit present.      Mental Status: She is alert.         Lines/Drains:  Lines/Drains/Airways       Active Status       None                        Additional Data:   Lab Results: I have reviewed the following results: CBC/BMP:   .     09/21/24 2020   WBC 15.01*   HGB 15.3   HCT 47.4*         Results from last 7 days   Lab Units 09/21/24 2020   WBC Thousand/uL 15.01*   HEMOGLOBIN g/dL 15.3   HEMATOCRIT % 47.4*   PLATELETS Thousands/uL 340   SEGS PCT % 85*   LYMPHO PCT % 9*   MONO PCT % 5   EOS PCT % 0                 Lab Results   Component Value Date    HGBA1C 5.6 02/22/2022    HGBA1C 5.7 (H) 04/01/2021    HGBA1C 5.8 (H) 07/02/2020           Administrative Statements   I have spent a total time of 75 minutes in caring for this patient on the day of the visit/encounter including Prognosis and Counseling / Coordination of care.    ** Please Note: This note has been constructed using a voice recognition system. **

## 2024-09-22 NOTE — PROGRESS NOTES
Progress Note - Hospitalist   Name: Oliva Dc 89 y.o. female I MRN: [id number]  [facility] Date of Admission: 9/21/2024   Date of Service: 9/22/2024 I Hospital Day: 1    Assessment & Plan  Comfort measures only status  Patient with history of advanced dementia, on home hospice, presented to the ER after several falls.  Daughter was unable to help her up and has been struggling to meet her care needs at home and thus brought pt to hospital for placement into facility for hospice care  She lives with daughter, Rosa Elena, at home. Patient's POA is her granddaughter, Eulalia.  They are both in agreement with pt remaining on comfort care measures/hospice and were interested in pursuing placement given difficulty caring for her at home.   CMO -- medications ordered.  Can defer on palliative consult at this time as goals are well outlined and symptoms are controlled.    CM consult.  Patient reportedly has a 100+ year old sister who lives at [facility] who Eulalia did report would be ideal if possible for hospice there.   CM d/w POA.  They will have to look into if they can afford room and board at facility vs if they will hire private caregivers for home to assist with pt care.    Hospice care patient  See comfort measures section above   Closed fracture of ninth thoracic vertebra (HCC)  Sustained compression fracture back in August in setting of advanced dementia, recurrent falls   On Comfort Measures  Multiple falls  In setting of advanced dementia   Dementia without behavioral disturbance (HCC)  Hx advanced dementia and dependent with ADLs    VTE Pharmacologic Prophylaxis: VTE Score: 3 Moderate Risk (Score 3-4) - Pharmacological DVT Prophylaxis Contraindicated. Sequential Compression Devices Ordered. CMO    Mobility:          Patient Centered Rounds: I evaluated the patient without nursing staff present due to w/ other pt    Discussions with Specialists or Other Care Team Provider: CM    Education and  Discussions with Family / Patient: no medical updates.     Current Length of Stay: 1 day(s)  Current Patient Status: Inpatient   Certification Statement: The patient will continue to require additional inpatient hospital stay due to safe dispo for hospice care.  Discharge Plan: TBD.    Code Status: Level 4 - Comfort Care    Subjective   Pt is pleasant.  Wants some water.  No complaints     Objective     Vitals:   Temp (24hrs), Av.6 °F (36.4 °C), Min:97.6 °F (36.4 °C), Max:97.6 °F (36.4 °C)    Temp:  [97.6 °F (36.4 °C)] 97.6 °F (36.4 °C)  HR:  [72-97] 72  Resp:  [13-16] 13  BP: ()/(55-79) 129/79  SpO2:  [94 %-98 %] 94 %  There is no height or weight on file to calculate BMI.     Input and Output Summary (last 24 hours):     Intake/Output Summary (Last 24 hours) at 2024 0840  Last data filed at 2024 0238  Gross per 24 hour   Intake --   Output 300 ml   Net -300 ml       Physical Exam  Vitals reviewed.   Constitutional:       General: She is not in acute distress.     Appearance: She is not toxic-appearing.   HENT:      Head: Normocephalic and atraumatic.   Pulmonary:      Effort: Pulmonary effort is normal. No respiratory distress.   Musculoskeletal:         General: Normal range of motion.   Neurological:      General: No focal deficit present.      Mental Status: She is alert.          Lines/Drains:  Lines/Drains/Airways       Active Status       Name Placement date Placement time Site Days    Urethral Catheter 16 Fr. 24  0237  --  less than 1                  Urinary Catheter:  Goal for removal: CMO                  Lab Results: I have reviewed the following results:    Results from last 7 days   Lab Units 24   WBC Thousand/uL 15.01*   HEMOGLOBIN g/dL 15.3   HEMATOCRIT % 47.4*   PLATELETS Thousands/uL 340   SEGS PCT % 85*   LYMPHO PCT % 9*   MONO PCT % 5   EOS PCT % 0                           Recent Cultures (last 7 days):         Imaging Review: No pertinent imaging studies  reviewed.  Other Studies: No additional pertinent studies reviewed.    Last 24 Hours Medication List:     Current Facility-Administered Medications:     acetaminophen (TYLENOL) rectal suppository 650 mg, Q8H PRN    bisacodyl (DULCOLAX) rectal suppository 10 mg, Daily PRN    haloperidol (HALDOL) oral concentrated solution 1 mg, Q6H PRN    haloperidol lactate (HALDOL) injection 0.5 mg, Q2H PRN    hyoscyamine (LEVSIN/SL) SL tablet 0.125 mg, Q4H PRN    LORazepam (ATIVAN) tablet 0.5 mg, Q6H PRN    mirtazapine (REMERON) tablet 7.5 mg, HS    Morphine Sulfate (Concentrate) oral concentrated solution 5 mg, Q3H PRN    prochlorperazine (COMPAZINE) tablet 10 mg, Q6H PRN    senna (SENOKOT) tablet 8.6 mg, Daily PRN    sertraline (ZOLOFT) tablet 50 mg, Daily    Administrative Statements   Today, Patient Was Seen By: Mar Dykes PA-C      **Please Note: This note may have been constructed using a voice recognition system.**

## 2024-09-22 NOTE — ASSESSMENT & PLAN NOTE
Patient with history of advanced dementia, on home hospice, presented to the ER after several falls.  Daughter was unable to help her up and has been struggling to meet her care needs at home and thus brought pt to hospital for placement into facility for hospice care  She lives with daughter, Rosa Elena, at home. Patient's POA is her granddaughter, Eulalia.  They are both in agreement with pt remaining on comfort care measures/hospice and were interested in pursuing placement given difficulty caring for her at home.   CMO -- medications ordered.  Can defer on palliative consult at this time as goals are well outlined and symptoms are controlled.    CM consult.  Patient reportedly has a 100+ year old sister who lives at [facility] who Eulalia did report would be ideal if possible for hospice there.   CM d/w POA.  They will have to look into if they can afford room and board at facility vs if they will hire private caregivers for home to assist with pt care.

## 2024-09-22 NOTE — ASSESSMENT & PLAN NOTE
Patient presented to the ER from home where she was on Home Hospice after several falls earlier today and patient's Daughter, Rosa Elena, unable to help assist patient up. Patient had been on Home Hospice through [facility] per daughter  Patient with hx advanced Dementia  Patient reportedly lives with Daughter, Rosa Elena, at home. Patient's POA though is her Granddaughter, Eulalia, and Eulalia does not live with patient at home but comes over to patient's home as much as possible but has her own family to care for as well.   After speaking with both Granddaughter Eulalia and Daughter Rosa Elena, it appears that Rosa Elena was overwhelmed with situation and called EMS to come help assist with getting patient up from ground and to bring to the hospital. Rosa Elena did report that she was aware that patient's on hospice do not typically get sent to the ER but was overwhelmed with situation   In the ER, patient's Granddaughter, Elualia, was present and rescinded home hospice and spoke with an RN from patient's hospice agency. Eulalia reports that this was done given that her mother Rosa Elena is unable to help assist with patient's care and with concern that patient will have no care if patient were to go back home.   Eulalia and I spoke and reports that she wants her mother to remain on Hospice while in the hospital and Eulalia reports that patient will likely need hospice at a facility given that Rosa Elena is unable to assist and Eulalia has demands of her own family   Eulalia verbalized to me also understanding of hospice and that is focuses soley on patient's symptoms/comfort.   Admit to medicine on Comfort Measures Only Status. Will put on patient's hospice medication regimen from outpatient and if patient needs additional IV pain/anxiety medications for symptoms will add. Consult Palliative Care as well as Case Management for assistance with need for hospice at a facility. Patient reportedly has a 100+ year old sister who lives at [facility] who Eulalia did report  would be ideal if possible for hospice there.   Please reach out to Eulalia, patient's Granddaughter for decisions and reported POA (reported by both eulalia and lesley)

## 2024-09-22 NOTE — ASSESSMENT & PLAN NOTE
Sustained compression fracture back in August in setting of advanced dementia, recurrent falls   On Comfort Measures

## 2024-09-23 PROCEDURE — 99231 SBSQ HOSP IP/OBS SF/LOW 25: CPT | Performed by: PHYSICIAN ASSISTANT

## 2024-09-23 RX ORDER — BISACODYL 10 MG
10 SUPPOSITORY, RECTAL RECTAL DAILY PRN
Status: DISCONTINUED | OUTPATIENT
Start: 2024-09-23 | End: 2024-09-26 | Stop reason: HOSPADM

## 2024-09-23 NOTE — PROGRESS NOTES
Progress Note - Hospitalist   Name: Oliva Dc 89 y.o. female I MRN: [id number]  Unit/Bed#: [facility] 901-01 I Date of Admission: 9/21/2024   Date of Service: 9/23/2024 I Hospital Day: 2    Assessment & Plan  Comfort measures only status  Patient with history of advanced dementia, on home hospice, presented to the ER after several falls.  Daughter was unable to help her up and has been struggling to meet her care needs at home and thus brought pt to hospital for placement into facility for hospice care  She lives with daughter, Rosa Elena, at home. Patient's POA is her granddaughter, Eulalia.  They are both in agreement with pt remaining on comfort care measures/hospice and were interested in pursuing placement given difficulty caring for her at home.   CMO -- medications ordered.  Can defer on palliative consult at this time as goals are well outlined and symptoms are controlled.    CM consult.  Patient reportedly has a 100+ year old sister who lives at [facility] who Eulalia did report would be ideal if possible for hospice there.   CM d/w POA.  They will have to look into if they can afford room and board at facility vs if they will hire private caregivers for home to assist with pt care.    Hospice care patient  See comfort measures section above   Closed fracture of ninth thoracic vertebra (HCC)  Sustained compression fracture back in August in setting of advanced dementia, recurrent falls   On Comfort Measures  Multiple falls  In setting of advanced dementia   Dementia without behavioral disturbance (HCC)  Hx advanced dementia and dependent with ADLs    VTE Pharmacologic Prophylaxis: VTE Score: 3 Moderate Risk (Score 3-4) - Pharmacological DVT Prophylaxis Contraindicated. Sequential Compression Devices Ordered.    Mobility:   YULISSA-ZOILA Achieved: 1: Laying in bed      Patient Centered Rounds: I performed bedside rounds with nursing staff today.   Discussions with Specialists or Other Care Team Provider: CM    Education and  Discussions with Family / Patient: Attempted to update  (granddaughter) via phone. Unable to contact.    Current Length of Stay: 2 day(s)  Current Patient Status: Inpatient   Certification Statement: The patient will continue to require additional inpatient hospital stay due to hospice care placement  Discharge Plan: Anticipate discharge in 24-48 hrs to TBD    Code Status: Level 4 - Comfort Care    Subjective   Offers no complaints, resting comfortably, she wants ice cream     Objective     Vitals:   No data recorded.    BP: (137)/(76) 137/76  There is no height or weight on file to calculate BMI.     Input and Output Summary (last 24 hours):     Intake/Output Summary (Last 24 hours) at 9/23/2024 0945  Last data filed at 9/23/2024 0829  Gross per 24 hour   Intake --   Output 450 ml   Net -450 ml       Physical Exam  Constitutional:       General: She is not in acute distress.     Appearance: She is not toxic-appearing.   HENT:      Head: Normocephalic and atraumatic.   Eyes:      Extraocular Movements: Extraocular movements intact.   Musculoskeletal:         General: Normal range of motion.   Neurological:      General: No focal deficit present.      Mental Status: She is alert.   Psychiatric:         Mood and Affect: Mood normal.         Behavior: Behavior normal.          Lines/Drains:  Lines/Drains/Airways       Active Status       Name Placement date Placement time Site Days    Urethral Catheter 16 Fr. 09/22/24  0237  --  1                  Urinary Catheter:  Goal for removal: CMO                 Lab Results: I have reviewed the following results:    Results from last 7 days   Lab Units 09/21/24 2020   WBC Thousand/uL 15.01*   HEMOGLOBIN g/dL 15.3   HEMATOCRIT % 47.4*   PLATELETS Thousands/uL 340   SEGS PCT % 85*   LYMPHO PCT % 9*   MONO PCT % 5   EOS PCT % 0                           Recent Cultures (last 7 days):         Imaging Review: No pertinent imaging studies reviewed.  Other Studies: No  additional pertinent studies reviewed.    Last 24 Hours Medication List:     Current Facility-Administered Medications:     acetaminophen (TYLENOL) rectal suppository 650 mg, Q8H PRN    bisacodyl (DULCOLAX) rectal suppository 10 mg, Daily PRN    haloperidol (HALDOL) oral concentrated solution 1 mg, Q6H PRN    haloperidol lactate (HALDOL) injection 0.5 mg, Q2H PRN    hyoscyamine (LEVSIN/SL) SL tablet 0.125 mg, Q4H PRN    LORazepam (ATIVAN) tablet 0.5 mg, Q6H PRN    mirtazapine (REMERON) tablet 7.5 mg, HS    Morphine Sulfate (Concentrate) oral concentrated solution 5 mg, Q3H PRN    prochlorperazine (COMPAZINE) tablet 10 mg, Q6H PRN    senna (SENOKOT) tablet 8.6 mg, Daily PRN    sertraline (ZOLOFT) tablet 50 mg, Daily    Administrative Statements   Today, Patient Was Seen By: Mar Dykes PA-C      **Please Note: This note may have been constructed using a voice recognition system.**

## 2024-09-23 NOTE — UTILIZATION REVIEW
Unable to start the authorization process through the [employer] portal due to portal issues      NOTIFICATION OF INPATIENT ADMISSION   AUTHORIZATION REQUEST   SERVICING FACILITY:   [facility]  Address: [address] [place] [address]  Tax ID: [id number]  NPI: [id number] ATTENDING PROVIDER:  Attending Name and NPI#: Kathy Wyman Md [[id number]]  Address: [address] [place] [address]  Phone: [phone]   ADMISSION INFORMATION:  Place of Service: Inpatient [facility]  Place of Service Code: 21  Inpatient Admission Date/Time: 9/21/24  9:11 PM  Discharge Date/Time: No discharge date for patient encounter.  Admitting Diagnosis Code/Description:  Hospice care patient [Z51.5]  Dementia without behavioral disturbance (HCC) [F03.90]  Generalized weakness [R53.1]  Comfort measures only status [Z51.5]  Ambulatory dysfunction [R26.2]  Unspecified multiple injuries, initial encounter [T07.XXXA]     UTILIZATION REVIEW CONTACT:  Bryn Jackson, Utilization   Network Utilization Review Department  Phone: [phone]  Fax: [phone]  Email: [email or web address]  Contact for approvals/pending authorizations, clinical reviews, and discharge.     PHYSICIAN ADVISORY SERVICES:  Medical Necessity Denial & Yprf-ts-Kacp Review  Phone: [phone]  Fax: [phone]  Email: [email or web address]     DISCHARGE SUPPORT TEAM:  For Patients Discharge Needs & Updates  Phone: [phone] opt. 2 Fax: [phone]  Email: [email or web address]

## 2024-09-23 NOTE — UTILIZATION REVIEW
Initial Clinical Review    Admission: Date/Time/Statement:   Admission Orders (From admission, onward)       Ordered        09/21/24 2111  INPATIENT ADMISSION  Once                          Orders Placed This Encounter   Procedures    INPATIENT ADMISSION     Standing Status:   Standing     Number of Occurrences:   1     Order Specific Question:   Level of Care     Answer:   Med Surg [16]     Order Specific Question:   Estimated length of stay     Answer:   More than 2 Midnights     Order Specific Question:   Certification     Answer:   I certify that inpatient services are medically necessary for this patient for a duration of greater than two midnights. See H&P and MD Progress Notes for additional information about the patient's course of treatment.     ED Arrival Information       Expected   -    Arrival   9/21/2024 16:44    Acuity   Urgent              Means of arrival   Ambulance    Escorted by   [facility]    Service   Hospitalist    Admission type   Emergency              Arrival complaint   evaluation             Chief Complaint   Patient presents with    Difficulty Walking    Multiple Falls     Pt lives at home with daughter who called Hospice today after pt fell x2 and was not able to get up off the floor. Pt reports that she has a hard time getting up and getting to the bathroom as she often feels dizzy and her back is broken and hurts her.        Initial Presentation: 89 y.o. female with advanced dementia with recent compression fracture to ED by ems from home after several falls earlier today and pt's daughter unable to help assist her up. Pt is currently on home hospice and lives with daughter, although granddaughter is POA. After physician discussion with daughter and granddaughter, it appears daughter was overwhelmed with situation and called EMS to come help assist with getting pt up from ground and to bring to the hospital as she is overwhelmed with caring for pt and feels she cannot do  it any longer. Family states pt will likely need a facility for pt hospice care at this point.  Admitted Inpatient to MS Unit on Comfort Measures Only Status -- prn IV pain/anxiety medications. CM consult for d/c assist.      Anticipated Length of Stay/Certification Statement: Patient will be admitted on an inpatient basis with an anticipated length of stay of greater than 2 midnights secondary to Comfort Measures Only .     Date: 9/22   Day 2: pt remains pleasant, confused. No complaints. CM consult -- pt reportedly has a 100+ year old sister who lives at [facility], and granddaughter reported it would be ideal if possible for hospice there. They will have to look into if they can afford room and board at facility vs if they will hire private caregivers for home to assist with pt care. Continue comfort/supportive care.    Date: 9/23  Day 3: Has surpassed a 2nd midnight with active treatments and services of comfort care while planning safe d/c .  Pt remains pleasant, confused. CM working with pt family on finding accepting facility.  Continue supportive/comfort care.        ED Triage Vitals   Temperature Pulse Respirations Blood Pressure SpO2 Pain Score   09/21/24 1705 09/21/24 1705 09/21/24 1705 09/21/24 1705 09/21/24 1705 09/22/24 0814   97.6 °F (36.4 °C) 97 16 99/55 98 % No Pain     Weight (last 2 days)       None            Vital Signs (last 3 days)       Date/Time Temp Pulse Resp BP MAP (mmHg) SpO2 O2 Device [place] Coma Scale Score Pain    09/23/24 0800 -- -- 18 -- -- -- -- 13 No Pain    09/23/24 05:52:11 -- -- -- 137/76 96 -- -- -- --    09/22/24 2246 -- -- -- -- -- -- -- 13 No Pain    09/22/24 08:14:12 -- 72 13 129/79 96 94 % None (Room air) 13 No Pain    09/21/24 1717 -- -- -- -- -- -- -- 14 --    09/21/24 1705 97.6 °F (36.4 °C) 97 16 99/55 -- 98 % None (Room air) -- --            Pertinent Labs/Diagnostic Test Results:    Results from last 7 days   Lab Units 09/21/24 2020   WBC Thousand/uL 15.01*    HEMOGLOBIN g/dL 15.3   HEMATOCRIT % 47.4*   PLATELETS Thousands/uL 340   TOTAL NEUT ABS Thousands/µL 12.91*     ED Treatment-Medication Administration from 09/21/2024 1643 to 09/22/2024 0749       None            History reviewed. No pertinent past medical history.  Present on Admission:   Dementia without behavioral disturbance (HCC)   Closed fracture of ninth thoracic vertebra (HCC)      Admitting Diagnosis: Hospice care patient [Z51.5]  Dementia without behavioral disturbance (HCC) [F03.90]  Generalized weakness [R53.1]  Comfort measures only status [Z51.5]  Ambulatory dysfunction [R26.2]  Unspecified multiple injuries, initial encounter [T07.XXXA]  Age/Sex: 89 y.o. female  Admission Orders:  Scheduled Medications:  mirtazapine, 7.5 mg, Oral, HS  sertraline, 50 mg, Oral, Daily    PRN Meds:  acetaminophen, 650 mg, Rectal, Q8H PRN  bisacodyl, 10 mg, Rectal, Daily PRN  haloperidol, 1 mg, Oral, Q6H PRN  haloperidol lactate, 0.5 mg, Intravenous, Q2H PRN  hyoscyamine, 0.125 mg, Oral, Q4H PRN  LORazepam, 0.5 mg, Oral, Q6H PRN  Morphine Sulfate (Concentrate), 5 mg, Oral, Q3H PRN  prochlorperazine, 10 mg, Oral, Q6H PRN  senna, 8.6 mg, Oral, Daily PRN        IP CONSULT TO CASE MANAGEMENT    Network Utilization Review Department  ATTENTION: Please call with any questions or concerns to [phone] and carefully listen to the prompts so that you are directed to the right person. All voicemails are confidential.   For Discharge needs, contact Care Management DC Support Team at [phone] opt. 2  Send all requests for admission clinical reviews, approved or denied determinations and any other requests to dedicated fax number below belonging to the campus where the patient is receiving treatment. List of dedicated fax numbers for the Facilities:  FACILITY NAME UR FAX NUMBER   ADMISSION DENIALS (Administrative/Medical Necessity) [phone]   DISCHARGE SUPPORT TEAM (NETWORK) [phone]   [facility]  (Maternity/NICU/Pediatrics) [phone]   [facility] [phone]   [facility] [phone]   [facility] [phone]   [facility] [phone]   [facility] [phone]   [facility] [phone]   [facility] [phone]   [facility] [phone]   [facility] [phone]   [facility] [phone]   [facility] [phone]   [facility] [phone]

## 2024-09-23 NOTE — ASSESSMENT & PLAN NOTE
Patient with history of advanced dementia, on home hospice, presented to the ER after several falls.  Daughter was unable to help her up and has been struggling to meet her care needs at home and thus brought pt to hospital for placement into facility for hospice care  She lives with daughter, Rosa Elena, at home. Patient's POA is her granddaughter, Eulalia.  They are both in agreement with pt remaining on comfort care measures/hospice and were interested in pursuing placement given difficulty caring for her at home.   CMO -- medications ordered.  Can defer on palliative consult at this time as goals are well outlined and symptoms are controlled.    CM consult.  Patient reportedly has a 100+ year old sister who lives at [facility] who Eulalia did report would be ideal if possible for hospice there.   CM d/w POA.  They will have to look into if they can afford room and board at facility vs if they will hire private caregivers for home to assist with pt care.

## 2024-09-23 NOTE — CASE MANAGEMENT
Case Management Discharge Planning Note    Patient name Oliva Dc  Location [facility] 901/[facility] 901-01 MRN [id number]  : 3/8/1924 Date 2024       Current Admission Date: 2024  Current Admission Diagnosis:Comfort measures only status   Patient Active Problem List    Diagnosis Date Noted Date Diagnosed    Comfort measures only status 2024     Hospice care patient 2024     Vitamin D insufficiency 2024     Atrial flutter (HCC) 2024     Multiple falls 2024     Ambulatory dysfunction 2024     Dementia without behavioral disturbance (HCC) 2024     Hypertension 2024     Chronic diarrhea 2024     At risk for delirium 2024     Hiatal hernia 2024     Dysphagia 2024     SIRS (systemic inflammatory response syndrome) (HCC) 2024     Closed fracture of ninth thoracic vertebra ([facility]) 2024       LOS (days): 2  Geometric Mean LOS (GMLOS) (days): 2.2  Days to GMLOS:0.5     OBJECTIVE:  Risk of Unplanned Readmission Score: 10.85         Current admission status: Inpatient   Preferred Pharmacy:   [facility] [place] [place] - [address]  [address] [facility] [address]  Phone: [phone] Fax: [phone]    Primary Care Provider: Cortney Sanchez MD    Primary Insurance: [employer] REP  Secondary Insurance:     DISCHARGE DETAILS:    Other Referral/Resources/Interventions Provided:  Referral Comments: Per [facility] communication, Alejandra is currently reviewing and referral remains under clinical review at this time. CM requested alejandra admissions make outreach to pt's granddaughter in order to discuss options. CM team will continue to follow.

## 2024-09-23 NOTE — PLAN OF CARE
Problem: Potential for Falls  Goal: Patient will remain free of falls  Description: INTERVENTIONS:  - Educate patient/family on patient safety including physical limitations  - Instruct patient to call for assistance with activity   - Consult OT/PT to assist with strengthening/mobility   - Keep Call bell within reach  - Keep bed low and locked with side rails adjusted as appropriate  - Keep care items and personal belongings within reach  - Initiate and maintain comfort rounds  - Make Fall Risk Sign visible to staff  - Offer Toileting every  Hours, in advance of need  - Initiate/Maintain alarm  - Obtain necessary fall risk management equipment:    Apply yellow socks and bracelet for high fall risk patients  - Consider moving patient to room near nurses station  Outcome: Progressing     Pt resting in bed, pt will wake and say a few words.  Pt aware of self only.  Pt requested ice cream.  PCA had tried chocolate pudding, however noted pt coughing and choking on that.  Pt agreeable to to have chocolate ice cream at this time.   Pt repositioned for comfort.  Bed alarm on, call bell in reach.

## 2024-09-24 ENCOUNTER — HOME CARE VISIT (OUTPATIENT)
Dept: HOME HEALTH SERVICES | Facility: HOME HEALTHCARE | Age: 89
End: 2024-09-24
Payer: MEDICARE

## 2024-09-24 PROCEDURE — 99232 SBSQ HOSP IP/OBS MODERATE 35: CPT | Performed by: INTERNAL MEDICINE

## 2024-09-24 RX ADMIN — MORPHINE SULFATE 2 MG: 2 INJECTION, SOLUTION INTRAMUSCULAR; INTRAVENOUS at 03:30

## 2024-09-24 NOTE — PROGRESS NOTES
Patient:    MRN:  [id number]    Aidin Request ID:  [id number]    Level of care reserved:  Hospice    Partner Reserved:  [facility], [place] [address] [phone]    Clinical needs requested:    Geography searched:  [id number]    Start of Service:    Request sent:  7:42am EDT on 9/24/2024 by Radha Galarza    Partner reserved:  8:53am EDT on 9/24/2024 by Radha Galarza    Choice list shared:  8:53am EDT on 9/24/2024 by Radha Galarza

## 2024-09-24 NOTE — HOSPICE NOTE
Received hospice referral. I spoke to pt's granddaughter Eulalia, hospice benefits reviewed per Medicare guidelines and all questions answered.  Dr Salmeron approved for home hospice LOC. Reviewed the number for hospice and emphasized not calling 911, Eulalia will review with her mother. Eulalia requested pt come home on Thursday as she can be at hospice open visit. KENYA Garcia updated and will arrange transport. Hospice will continue to follow until discharge.

## 2024-09-24 NOTE — Clinical Note
I have a home hospice referral Oliva Dc is a 89 yo female, recently started on  Hospice on 9/14 for diagnosis Hypertensive Heart and Kidney disease. She fell on 9/21 and daughter called 911. She also has a history of Dementia. She has been having more frequent falls and increased weakness. Poor appetite and difficulty sleeping. Dementia is mild, oriented to person and place. Looks very frail. Labs BUN 23 creat 0.96. PPS 40. I did review hospice with family, discussed calling hospice and not calling 911.

## 2024-09-24 NOTE — PROGRESS NOTES
Progress Note - Hospitalist   Name: Oliva Dc 89 y.o. female I MRN: [id number]  Unit/Bed#: [facility] 901-01 I Date of Admission: 9/21/2024   Date of Service: 9/24/2024 I Hospital Day: 3    Assessment & Plan  Comfort measures only status  Patient with history of advanced dementia, on home hospice, presented to the ER after several falls.  Daughter was unable to help her up and has been struggling to meet her care needs at home and thus brought pt to hospital for placement into facility for hospice care  She lives with daughter, Rosa Elena, at home. Patient's POA is her granddaughter, Eulalia.  They are both in agreement with pt remaining on comfort care measures/hospice and were interested in pursuing placement given difficulty caring for her at home.   CMO -- medications ordered.  Can defer on palliative consult at this time as goals are well outlined and symptoms are controlled.    CM consult.  Patient reportedly has a 100+ year old sister who lives at [facility] who Eulalia did report would be ideal if possible for hospice there.   CM d/w POA.  They will have to look into if they can afford room and board at facility vs if they will hire private caregivers for home to assist with pt care.    Hospice care patient  See comfort measures section above   Closed fracture of ninth thoracic vertebra (HCC)  Sustained compression fracture back in August in setting of advanced dementia, recurrent falls   On Comfort Measures  Multiple falls  In setting of advanced dementia   Dementia without behavioral disturbance (HCC)  Hx advanced dementia and dependent with ADLs    VTE Pharmacologic Prophylaxis: VTE Score: 3 Low Risk (Score 0-2) - Encourage Ambulation.    Mobility:   Basic Mobility Inpatient Raw Score: 8  JH-HLM Goal: 3: Sit at edge of bed  JH-HLM Achieved: 1: Laying in bed  JH-HLM Goal achieved. Continue to encourage appropriate mobility.    Patient Centered Rounds: I performed bedside rounds with nursing staff today.    Discussions with Specialists or Other Care Team Provider:     Education and Discussions with Family / Patient:  unable to reach family.     Current Length of Stay: 3 day(s)  Current Patient Status: Inpatient   Certification Statement: The patient will continue to require additional inpatient hospital stay due to hospice  Discharge Plan: Anticipate discharge in 48 hrs to hospice    Code Status: Level 4 - Comfort Care    Subjective   Patient appears comfortable    Objective     Vitals:   No data recorded.    Resp:  [20] 20  There is no height or weight on file to calculate BMI.     Input and Output Summary (last 24 hours):     Intake/Output Summary (Last 24 hours) at 9/24/2024 1616  Last data filed at 9/24/2024 0911  Gross per 24 hour   Intake --   Output 450 ml   Net -450 ml       Physical Exam  Constitutional:       Appearance: She is ill-appearing. She is not toxic-appearing.   Eyes:      General: No scleral icterus.  Cardiovascular:      Rate and Rhythm: Normal rate and regular rhythm.      Heart sounds: No murmur heard.     No friction rub. No gallop.   Pulmonary:      Effort: No respiratory distress.      Breath sounds: No stridor. No wheezing, rhonchi or rales.   Chest:      Chest wall: No tenderness.   Abdominal:      General: There is no distension.      Palpations: There is no mass.      Tenderness: There is no abdominal tenderness. There is no guarding or rebound.      Hernia: No hernia is present.          Lines/Drains:  Lines/Drains/Airways       Active Status       Name Placement date Placement time Site Days    Urethral Catheter 16 Fr. 09/22/24  0237  --  2                  Urinary Catheter:  Goal for removal: N/A- Discharging with [place]                 Lab Results: I have reviewed the following results: CBC/BMP: No new results in last 24 hours.    Results from last 7 days   Lab Units 09/21/24 2020   WBC Thousand/uL 15.01*   HEMOGLOBIN g/dL 15.3   HEMATOCRIT % 47.4*   PLATELETS Thousands/uL 340    SEGS PCT % 85*   LYMPHO PCT % 9*   MONO PCT % 5   EOS PCT % 0                           Recent Cultures (last 7 days):         Imaging Review: No pertinent imaging studies reviewed.  Other Studies: No additional pertinent studies reviewed.    Last 24 Hours Medication List:     Current Facility-Administered Medications:     acetaminophen (TYLENOL) rectal suppository 650 mg, Q8H PRN    bisacodyl (DULCOLAX) rectal suppository 10 mg, Daily PRN    haloperidol (HALDOL) oral concentrated solution 1 mg, Q6H PRN    haloperidol lactate (HALDOL) injection 0.5 mg, Q2H PRN    hyoscyamine (LEVSIN/SL) SL tablet 0.125 mg, Q4H PRN    LORazepam (ATIVAN) tablet 0.5 mg, Q6H PRN    mirtazapine (REMERON) tablet 7.5 mg, HS    morphine injection 2 mg, Q4H PRN    Morphine Sulfate (Concentrate) oral concentrated solution 5 mg, Q3H PRN    prochlorperazine (COMPAZINE) tablet 10 mg, Q6H PRN    senna (SENOKOT) tablet 8.6 mg, Daily PRN    sertraline (ZOLOFT) tablet 50 mg, Daily    Administrative Statements   Today, Patient Was Seen By: Richard Arnold DO      **Please Note: This note may have been constructed using a voice recognition system.**

## 2024-09-24 NOTE — CASE MANAGEMENT
Case Management Discharge Planning Note    Patient name Oliva Dc  Location [facility] 901/[facility] 901-01 MRN [id number]  : 3/8/1924 Date 2024       Current Admission Date: 2024  Current Admission Diagnosis:Comfort measures only status   Patient Active Problem List    Diagnosis Date Noted Date Diagnosed    Comfort measures only status 2024     Hospice care patient 2024     Vitamin D insufficiency 2024     Atrial flutter (HCC) 2024     Multiple falls 2024     Ambulatory dysfunction 2024     Dementia without behavioral disturbance (HCC) 2024     Hypertension 2024     Chronic diarrhea 2024     At risk for delirium 2024     Hiatal hernia 2024     Dysphagia 2024     SIRS (systemic inflammatory response syndrome) (HCC) 2024     Closed fracture of ninth thoracic vertebra ([facility]) 2024       LOS (days): 3  Geometric Mean LOS (GMLOS) (days): 2.2  Days to GMLOS:-0.2     OBJECTIVE:  Risk of Unplanned Readmission Score: 11.09         Current admission status: Inpatient   Preferred Pharmacy:   [facility] [place], [place] - [address]  [address] [place] [address]  Phone: [phone] Fax: [phone]    Primary Care Provider: Cortney Sanchez MD    Primary Insurance: [employer] REP  Secondary Insurance:     DISCHARGE DETAILS:    Discharge planning discussed with:: Pt's granddaughter Eulalia  Freedom of Choice: Yes  Comments - Freedom of Choice: Pt's granddaughter requesting  Hospice  CM contacted family/caregiver?: Yes  Were Treatment Team discharge recommendations reviewed with patient/caregiver?: Yes  Did patient/caregiver verbalize understanding of patient care needs?: Yes  Were patient/caregiver advised of the risks associated with not following Treatment Team discharge recommendations?: Yes    Contacts  Patient Contacts: Eulalia  Relationship to Patient:: Family  Contact Method: Phone  Phone Number:  [phone]  Reason/Outcome: Continuity of Care, Emergency Contact, Referral, Discharge Planning    Requested Home Health Care         Is the patient interested in HHC at discharge?: No    DME Referral Provided  Referral made for DME?: No    Other Referral/Resources/Interventions Provided:  Interventions: Hospice  Referral Comments: Pt's granddaughter now requesting pt return home with  Hospice, no longer considering [facility] for placement         Treatment Team Recommendation: Hospice  Discharge Destination Plan:: Hospice

## 2024-09-24 NOTE — ASSESSMENT & PLAN NOTE
Patient with history of advanced dementia, on home hospice, presented to the ER after several falls.  Daughter was unable to help her up and has been struggling to meet her care needs at home and thus brought pt to hospital for placement into facility for hospice care  She lives with daughter, Rosa Elena, at home. Patient's POA is her granddaughter, Eulalia.  They are both in agreement with pt remaining on comfort care measures/hospice and were interested in pursuing placement given difficulty caring for her at home.   CMO -- medications ordered.  Can defer on palliative consult at this time as goals are well outlined and symptoms are controlled.    CM consult.  Patient reportedly has a 100+ year old sister who lives at [facility] who Eulalia did report would be ideal if possible for hospice there.   CM d/w POA.  They will have to look into if they can afford room and board at facility vs if they will hire private caregivers for home to assist with pt care.

## 2024-09-25 PROCEDURE — 99232 SBSQ HOSP IP/OBS MODERATE 35: CPT | Performed by: INTERNAL MEDICINE

## 2024-09-25 RX ADMIN — MORPHINE SULFATE 5 MG: 20 SOLUTION ORAL at 17:13

## 2024-09-25 RX ADMIN — PROCHLORPERAZINE MALEATE 10 MG: 10 TABLET ORAL at 14:54

## 2024-09-25 NOTE — PLAN OF CARE
Problem: Potential for Falls  Goal: Patient will remain free of falls  Description: INTERVENTIONS:  - Educate patient/family on patient safety including physical limitations  - Instruct patient to call for assistance with activity   - Consult OT/PT to assist with strengthening/mobility   - Keep Call bell within reach  - Keep bed low and locked with side rails adjusted as appropriate  - Keep care items and personal belongings within reach  - Initiate and maintain comfort rounds  - Make Fall Risk Sign visible to staff  - Offer Toileting every *** Hours, in advance of need  - Initiate/Maintain ***alarm  - Obtain necessary fall risk management equipment: ***  - Apply yellow socks and bracelet for high fall risk patients  - Consider moving patient to room near nurses station  Outcome: Progressing     Problem: Prexisting or High Potential for Compromised Skin Integrity  Goal: Skin integrity is maintained or improved  Description: INTERVENTIONS:  - Identify patients at risk for skin breakdown  - Assess and monitor skin integrity  - Assess and monitor nutrition and hydration status  - Monitor labs   - Assess for incontinence   - Turn and reposition patient  - Assist with mobility/ambulation  - Relieve pressure over bony prominences  - Avoid friction and shearing  - Provide appropriate hygiene as needed including keeping skin clean and dry  - Evaluate need for skin moisturizer/barrier cream  - Collaborate with interdisciplinary team   - Patient/family teaching  - Consider wound care consult   Outcome: Progressing

## 2024-09-25 NOTE — CASE MANAGEMENT
Case Management Discharge Planning Note    Patient name Oliva Dc  Location [facility] 901/[facility] 901-01 MRN [id number]  : 3/8/1924 Date 2024       Current Admission Date: 2024  Current Admission Diagnosis:Comfort measures only status   Patient Active Problem List    Diagnosis Date Noted Date Diagnosed    Comfort measures only status 2024     Hospice care patient 2024     Vitamin D insufficiency 2024     Atrial flutter (HCC) 2024     Multiple falls 2024     Ambulatory dysfunction 2024     Dementia without behavioral disturbance (HCC) 2024     Hypertension 2024     Chronic diarrhea 2024     At risk for delirium 2024     Hiatal hernia 2024     Dysphagia 2024     SIRS (systemic inflammatory response syndrome) (HCC) 2024     Closed fracture of ninth thoracic vertebra (HCC) 2024       LOS (days): 4  Geometric Mean LOS (GMLOS) (days): 2.2  Days to GMLOS:-1.5     OBJECTIVE:  Risk of Unplanned Readmission Score: 11.32         Current admission status: Inpatient   Preferred Pharmacy:   [facility] [place], [place] - [address]  [address] [facility] [address]  Phone: [phone] Fax: [phone]    Primary Care Provider: Cortney Sanchez MD    Primary Insurance: [employer]  Secondary Insurance:     DISCHARGE DETAILS:    Discharge planning discussed with:: Pt's granddaughter Eulalia  Freedom of Choice: Yes  Comments - [place] of Choice:  Hospice  CM contacted family/caregiver?: Yes  Were Treatment Team discharge recommendations reviewed with patient/caregiver?: Yes  Did patient/caregiver verbalize understanding of patient care needs?: Yes  Were patient/caregiver advised of the risks associated with not following Treatment Team discharge recommendations?: Yes    Contacts  Patient Contacts: Eulalia  Relationship to Patient:: Family  Contact Method: Phone  Phone Number: [phone]  Reason/Outcome:  Continuity of Care, Emergency Contact, Referral, Discharge Planning    Requested Home Health Care         Is the patient interested in HHC at discharge?: No    DME Referral Provided  Referral made for DME?: No    Other Referral/Resources/Interventions Provided:  Interventions: Hospice  Referral Comments: Pt to return home with  Hospice         Treatment Team Recommendation: Hospice  Discharge Destination Plan:: Hospice  Transport at Discharge : [facility]     Number/Name of Dispatcher: Roundtr     ETA of Transport (Date): 09/26/24  ETA of Transport (Time): 1000     Transfer Mode: Stretcher  Accompanied by: EMS personnel

## 2024-09-25 NOTE — PROGRESS NOTES
Progress Note - Hospitalist   Name: Oliva Dc 89 y.o. female I MRN: [id number]  Unit/Bed#: [facility] 901-01 I Date of Admission: 9/21/2024   Date of Service: 9/25/2024 I Hospital Day: 4    Assessment & Plan  Comfort measures only status  Patient with history of advanced dementia, on home hospice, presented to the ER after several falls.  Daughter was unable to help her up and has been struggling to meet her care needs at home and thus brought pt to hospital for placement into facility for hospice care  She lives with daughter, Rosa Elena, at home. Patient's POA is her granddaughter, Eulalia.  They are both in agreement with pt remaining on comfort care measures/hospice and were interested in pursuing placement given difficulty caring for her at home.   CMO -- medications ordered.  Can defer on palliative consult at this time as goals are well outlined and symptoms are controlled.    CM consult.  Patient reportedly has a 100+ year old sister who lives at [facility] who Eulalia did report would be ideal if possible for hospice there.   CM d/w POA.  They will have to look into if they can afford room and board at facility vs if they will hire private caregivers for home to assist with pt care.    Hospice care patient  See comfort measures section above   Closed fracture of ninth thoracic vertebra (HCC)  Sustained compression fracture back in August in setting of advanced dementia, recurrent falls   On Comfort Measures  Multiple falls  In setting of advanced dementia   Dementia without behavioral disturbance (HCC)  Hx advanced dementia and dependent with ADLs    VTE Pharmacologic Prophylaxis: VTE Score: 3 Low Risk (Score 0-2) - Encourage Ambulation.    Mobility:   Basic Mobility Inpatient Raw Score: 8  JH-HLM Goal: 3: Sit at edge of bed  JH-HLM Achieved: 2: Bed activities/Dependent transfer  JH-HLM Goal achieved. Continue to encourage appropriate mobility.    Patient Centered Rounds: I performed bedside rounds with nursing  staff today.   Discussions with Specialists or Other Care Team Provider:     Education and Discussions with Family / Patient: Attempted to update  (daughter) via phone. Unable to contact.    Current Length of Stay: 4 day(s)  Current Patient Status: Inpatient   Certification Statement: The patient will continue to require additional inpatient hospital stay due to hospice placmeent  Discharge Plan: Anticipate discharge tomorrow to hospice    Code Status: Level 4 - Comfort Care    Subjective   Patient denies any acute complaints    Objective     Vitals:   No data recorded.    Resp:  [18] 18  There is no height or weight on file to calculate BMI.     Input and Output Summary (last 24 hours):     Intake/Output Summary (Last 24 hours) at 9/25/2024 1709  Last data filed at 9/25/2024 0501  Gross per 24 hour   Intake 120 ml   Output 250 ml   Net -130 ml       Physical Exam  Constitutional:       General: She is not in acute distress.     Appearance: She is ill-appearing. She is not toxic-appearing.      Comments: Frail cachectic chronically ill-appearing   Eyes:      General: No scleral icterus.  Cardiovascular:      Rate and Rhythm: Normal rate and regular rhythm.      Heart sounds: No murmur heard.     No friction rub. No gallop.   Pulmonary:      Effort: No respiratory distress.      Breath sounds: No stridor. No wheezing, rhonchi or rales.   Chest:      Chest wall: No tenderness.   Abdominal:      General: There is no distension.      Palpations: There is no mass.      Tenderness: There is no abdominal tenderness. There is no guarding or rebound.      Hernia: No hernia is present.   Musculoskeletal:         General: No swelling or tenderness.          Lines/Drains:  Lines/Drains/Airways       Active Status       Name Placement date Placement time Site Days    Urethral Catheter 16 Fr. 09/22/24  0237  --  3                  Urinary Catheter:  Goal for removal: N/A- Discharging with [place]                 Lab  Results: I have reviewed the following results: CBC/BMP: No new results in last 24 hours.    Results from last 7 days   Lab Units 09/21/24 2020   WBC Thousand/uL 15.01*   HEMOGLOBIN g/dL 15.3   HEMATOCRIT % 47.4*   PLATELETS Thousands/uL 340   SEGS PCT % 85*   LYMPHO PCT % 9*   MONO PCT % 5   EOS PCT % 0                           Recent Cultures (last 7 days):         Imaging Review: No pertinent imaging studies reviewed.  Other Studies: No additional pertinent studies reviewed.    Last 24 Hours Medication List:     Current Facility-Administered Medications:     acetaminophen (TYLENOL) rectal suppository 650 mg, Q8H PRN    bisacodyl (DULCOLAX) rectal suppository 10 mg, Daily PRN    haloperidol (HALDOL) oral concentrated solution 1 mg, Q6H PRN    haloperidol lactate (HALDOL) injection 0.5 mg, Q2H PRN    hyoscyamine (LEVSIN/SL) SL tablet 0.125 mg, Q4H PRN    LORazepam (ATIVAN) tablet 0.5 mg, Q6H PRN    mirtazapine (REMERON) tablet 7.5 mg, HS    morphine injection 2 mg, Q4H PRN    Morphine Sulfate (Concentrate) oral concentrated solution 5 mg, Q3H PRN    prochlorperazine (COMPAZINE) tablet 10 mg, Q6H PRN    senna (SENOKOT) tablet 8.6 mg, Daily PRN    sertraline (ZOLOFT) tablet 50 mg, Daily    Administrative Statements   Today, Patient Was Seen By: Richard Arnold DO      **Please Note: This note may have been constructed using a voice recognition system.**

## 2024-09-25 NOTE — ASSESSMENT & PLAN NOTE
Patient with history of advanced dementia, on home hospice, presented to the ER after several falls.  Daughter was unable to help her up and has been struggling to meet her care needs at home and thus brought pt to hospital for placement into facility for hospice care  She lives with daughter, Rosa Elena, at home. Patient's POA is her granddaughter, Eulalia.  They are both in agreement with pt remaining on comfort care measures/hospice and were interested in pursuing placement given difficulty caring for her at home.   CMO -- medications ordered.  Can defer on palliative consult at this time as goals are well outlined and symptoms are controlled.    CM consult.  Patient reportedly has a 100+ year old sister who lives at [facility] who Eulalia did report would be ideal if possible for hospice there.   CM d/w POA.  They will have to look into if they can afford room and board at facility vs if they will hire private caregivers for home to assist with pt care.

## 2024-09-26 ENCOUNTER — HOME CARE VISIT (OUTPATIENT)
Dept: HOME HOSPICE | Facility: HOSPICE | Age: 89
End: 2024-09-26
Payer: MEDICARE

## 2024-09-26 ENCOUNTER — HOME CARE VISIT (OUTPATIENT)
Dept: HOME HEALTH SERVICES | Facility: HOME HEALTHCARE | Age: 89
End: 2024-09-26
Payer: MEDICARE

## 2024-09-26 VITALS
TEMPERATURE: 97.5 F | SYSTOLIC BLOOD PRESSURE: 122 MMHG | DIASTOLIC BLOOD PRESSURE: 74 MMHG | RESPIRATION RATE: 18 BRPM | HEART RATE: 76 BPM

## 2024-09-26 VITALS
RESPIRATION RATE: 18 BRPM | HEART RATE: 72 BPM | DIASTOLIC BLOOD PRESSURE: 73 MMHG | TEMPERATURE: 97.6 F | SYSTOLIC BLOOD PRESSURE: 128 MMHG | OXYGEN SATURATION: 94 %

## 2024-09-26 PROCEDURE — G0299 HHS/HOSPICE OF RN EA 15 MIN: HCPCS

## 2024-09-26 PROCEDURE — 99239 HOSP IP/OBS DSCHRG MGMT >30: CPT | Performed by: INTERNAL MEDICINE

## 2024-09-26 PROCEDURE — G0155 HHCP-SVS OF CSW,EA 15 MIN: HCPCS

## 2024-09-26 PROCEDURE — 99285 EMERGENCY DEPT VISIT HI MDM: CPT | Performed by: EMERGENCY MEDICINE

## 2024-09-26 NOTE — ED PROVIDER NOTES
"Final diagnoses:   Ambulatory dysfunction   Generalized weakness     ED Disposition       ED Disposition   Admit    Condition   Stable    Date/Time   Sat Sep 21, 2024  9:10 PM    Comment   Case was discussed with Dr Prince Whyte and the patient's admission status was agreed to be Admission Status: inpatient status to the service of Dr. Whyte .               Assessment & Plan       Medical Decision Making  Patient is a 89 y.o. female with PMH of hypertension, ambulatory dysfunction, dementia who presents to the ED with increased falls at home.    Vital signs within normal limits. On exam patient has atraumatic exam, only tenderness over compression fracture that occurred 1 month ago.    History and physical exam most consistent with increased ambulatory dysfunction. However, differential diagnosis included but not limited to increased falls due to anemia or electrolyte abnormalities..     Plan: CBC and BMP    CBC not show any evidence of anemia and BMP hemolyzed multiple times.  Discussed with hospice and hospice nurse will come down and speak to the family regarding her options.    Patient's family has agreed to admit the patient to allow the patient to be placed in an inpatient facility.    View ED course above for further discussion on patient workup.       All labs reviewed and utilized in the medical decision making process  All radiology studies independently viewed by me and interpreted by the radiologist.  I reviewed all testing with the patient.     Patient's family and patient agrees understands plan.  All questions answered.  Will discuss with [facility]    Disposition: Admission to [facility]    Portions of the record may have been created with voice recognition software. Occasional wrong word or \"sound a like\" substitutions may have occurred due to the inherent limitations of voice recognition software. Read the chart carefully and recognize, using context, where substitutions have occurred.      Amount and/or " Complexity of Data Reviewed  Labs: ordered.    Risk  Decision regarding hospitalization.        ED Course as of 09/26/24 1558   Sat Sep 21, 2024   1854 Hospice nurses coming to speak to the patient and family       Medications - No data to display    ED Risk Strat Scores                   Identification of Seniors at Risk      Flowsheet Row Most Recent Value   (ISAR) Identification of Seniors at Risk    Before the illness or injury that brought you to the Emergency, did you need someone to help you on a regular basis? 1 Filed at: 09/21/2024 1707   In the last 24 hours, have you needed more help than usual? 1 Filed at: 09/21/2024 1707   Have you been hospitalized for one or more nights during the past 6 months? 1 Filed at: 09/21/2024 1707   In general, do you see well? 1 Filed at: 09/21/2024 1707   In general, do you have serious problems with your memory? 1 Filed at: 09/21/2024 1707   Do you take more than three different medications every day? 1 Filed at: 09/21/2024 1707   ISAR Score 6 Filed at: 09/21/2024 1707                                    History of Present Illness       Chief Complaint   Patient presents with    Difficulty Walking    Multiple Falls     Pt lives at home with daughter who called Hospice today after pt fell x2 and was not able to get up off the floor. Pt reports that she has a hard time getting up and getting to the bathroom as she often feels dizzy and her back is broken and hurts her.        History reviewed. No pertinent past medical history.   History reviewed. No pertinent surgical history.   History reviewed. No pertinent family history.   Social History     Tobacco Use    Smoking status: Never      E-Cigarette/Vaping      E-Cigarette/Vaping Substances      I have reviewed and agree with the history as documented.     89-year-old female with past medical history of dementia, compression fracture, hypertension presents for multiple falls.  The patient is at home and receives home health  care from hospice.  She lives with her daughter.  She had 2 episodes where the patient fell today and was unable to stand up without assistance.  Daughter was not able to help her and needed to call 911 for lift assistance.  There was no injury during the fall as it was witnessed.  Patient's daughter is concerned that they cannot provide adequate care for her at home.  Patient currently denies all symptoms aside from mild back pain at the site of her known compression fracture which occurred last month after a fall.  Patient's family is in room and is requesting additional help caring for their family member.  Denies chest pain, shortness of breath, urinary symptoms, abdominal pain, fever, headache, dizziness.                Review of Systems   Constitutional:  Negative for chills and fever.   HENT:  Negative for ear pain and sore throat.    Eyes:  Negative for pain and visual disturbance.   Respiratory:  Negative for cough and shortness of breath.    Cardiovascular:  Negative for chest pain and palpitations.   Gastrointestinal:  Negative for abdominal pain, constipation, diarrhea, nausea and vomiting.   Genitourinary:  Negative for dysuria and hematuria.   Musculoskeletal:  Positive for back pain. Negative for arthralgias.        Known compression fracture   Skin:  Negative for color change and rash.   Neurological:  Negative for dizziness, seizures, syncope, weakness, light-headedness, numbness and headaches.   All other systems reviewed and are negative.          Objective       ED Triage Vitals   Temperature Pulse Blood Pressure Respirations SpO2 Patient Position - Orthostatic VS   09/21/24 1705 09/21/24 1705 09/21/24 1705 09/21/24 1705 09/21/24 1705 --   97.6 °F (36.4 °C) 97 99/55 16 98 %       Temp src Heart Rate Source BP Location FiO2 (%) Pain Score    -- -- -- -- 09/22/24 0814        No Pain      Vitals      Date and Time Temp Pulse SpO2 Resp BP Pain Score FACES Pain Rating User   09/26/24 1009 -- -- -- --  128/73 -- -- DII   09/25/24 2100 -- -- -- -- -- No Pain -- ES   09/25/24 1713 -- -- -- -- -- 6 -- AO   09/25/24 1300 -- -- -- -- -- No Pain -- MB   09/25/24 0300 -- -- -- 18 -- -- -- NH   09/24/24 2300 -- -- -- -- -- No Pain -- NH   09/24/24 0330 -- -- -- -- -- 9 -- PK   09/23/24 2004 -- -- -- -- -- No Pain -- PK   09/23/24 1636 -- -- -- 20 -- -- -- KB   09/23/24 0800 -- -- -- 18 -- No Pain -- EMLIA   09/23/24 0552 -- -- -- -- 137/76 -- -- DII   09/22/24 2246 -- -- -- -- -- No Pain -- PK   09/22/24 0814 -- -- -- -- -- No Pain -- GDW   09/22/24 0814 -- 72 94 % 13 129/79 -- -- DII   09/21/24 1705 97.6 °F (36.4 °C) 97 98 % 16 99/55 -- 6 HK            Physical Exam  Vitals and nursing note reviewed.   Constitutional:       General: She is not in acute distress.     Appearance: She is well-developed.      Comments: Chronically ill-appearing   HENT:      Head: Normocephalic and atraumatic.   Eyes:      Extraocular Movements: Extraocular movements intact.      Conjunctiva/sclera: Conjunctivae normal.      Pupils: Pupils are equal, round, and reactive to light.   Cardiovascular:      Rate and Rhythm: Normal rate and regular rhythm.      Heart sounds: Normal heart sounds. No murmur heard.  Pulmonary:      Effort: Pulmonary effort is normal. No respiratory distress.      Breath sounds: Normal breath sounds. No stridor, decreased air movement or transmitted upper airway sounds.   Abdominal:      Palpations: Abdomen is soft.      Tenderness: There is no abdominal tenderness. There is no guarding or rebound.   Musculoskeletal:         General: No swelling.      Cervical back: Normal range of motion and neck supple.      Comments: No tenderness on extremities   Skin:     General: Skin is warm and dry.      Capillary Refill: Capillary refill takes less than 2 seconds.   Neurological:      Mental Status: She is alert. Mental status is at baseline.      Sensory: Sensation is intact.      Motor: Motor function is intact.   Psychiatric:          Mood and Affect: Mood normal.         Results Reviewed       Procedure Component Value Units Date/Time    CBC and differential [[id number]]  (Abnormal) Collected: 09/21/24 2020    Lab Status: Final result Specimen: Blood from Hand, Right Updated: 09/21/24 2028     WBC 15.01 Thousand/uL      RBC 4.84 Million/uL      Hemoglobin 15.3 g/dL      Hematocrit 47.4 %      MCV 98 fL      MCH 31.6 pg      MCHC 32.3 g/dL      RDW 13.8 %      MPV 10.4 fL      Platelets 340 Thousands/uL      nRBC 0 /100 WBCs      Segmented % 85 %      Immature Grans % 1 %      Lymphocytes % 9 %      Monocytes % 5 %      Eosinophils Relative 0 %      Basophils Relative 0 %      Absolute Neutrophils 12.91 Thousands/µL      Absolute Immature Grans 0.08 Thousand/uL      Absolute Lymphocytes 1.32 Thousands/µL      Absolute Monocytes 0.67 Thousand/µL      Eosinophils Absolute 0.01 Thousand/µL      Basophils Absolute 0.02 Thousands/µL             No orders to display       Procedures    ED Medication and Procedure Management   Prior to Admission Medications   Prescriptions Last Dose Informant Patient Reported? Taking?   Acetaminophen Extra Strength 500 MG TABS   Yes No   Sig: Take 500 mg by mouth 3 (three) times a day. Indications: Pain   LORazepam (ATIVAN) 0.5 mg tablet   Yes No   Sig: Take 0.5 mg by mouth every 6 (six) hours as needed for anxiety (dyspnea). Indications: Feeling Anxious, dyspnea   Morphine Sulfate, Concentrate, 20 mg/mL concentrated solution   Yes No   Sig: Take 5 mg by mouth every 3 (three) hours as needed for moderate pain or severe pain (dyspnea). comfort med on hold  Indications: Difficulty Breathing, Pain   acetaminophen (TYLENOL) 650 mg suppository   Yes No   Sig: Insert 650 mg into the rectum every 8 (eight) hours as needed for fever or headaches. comfort pack on hold, as needed for fever, headache  Indications: Fever, Pain   bisacodyl (DULCOLAX) 10 mg suppository   Yes No   Sig: Insert 10 mg into the rectum daily as  needed for constipation. comfort med on hold  Indications: Constipation   diphenoxylate-atropine (LOMOTIL) 2.5-0.025 mg per tablet   Yes No   Sig: Take 2 tablets by mouth 2 (two) times a day as needed for diarrhea. Take 1-2 tabs twice daily as needed for diarrhea  Indications: Diarrhea   furosemide (LASIX) 20 mg tablet   Yes No   Sig: Take 20 mg by mouth daily. Indications: Edema   haloperidol (HALDOL) oral concentrated solution   Yes No   Sig: Take 1 mg by mouth every 6 (six) hours as needed for agitation (n/v). comfort med on hold  Indications: Agitated Movements Accompanied by Emotional Distress, Nausea and Vomiting   hyoscyamine (LEVSIN/SL) 0.125 mg SL tablet   Yes No   Sig: Take 0.125 mg by mouth every 4 (four) hours as needed (secretions). comfort med on hold  Indications: excessive secretions   loperamide (IMODIUM A-D) 2 MG tablet   Yes No   Sig: Take 2 mg by mouth daily as needed for diarrhea. Indications: Diarrhea   mirtazapine (REMERON) 15 mg tablet   Yes No   Sig: Take 7.5 mg by mouth daily at bedtime. Indications: Major Depressive Disorder   prochlorperazine (COMPAZINE) 10 mg tablet   Yes No   Sig: Take 10 mg by mouth every 6 (six) hours as needed for nausea or vomiting. comfort med on hold  Indications: Nausea and Vomiting   senna (SENOKOT) 8.6 mg   Yes No   Sig: Take 8.6 mg by mouth daily as needed for constipation. Indications: Constipation   sertraline (ZOLOFT) 50 mg tablet   Yes No   Sig: Take 50 mg by mouth daily. Indications: Generalized Anxiety Disorder, Major Depressive Disorder      Facility-Administered Medications: None     Discharge Medication List as of 9/26/2024  9:43 AM        CONTINUE these medications which have NOT CHANGED    Details   acetaminophen (TYLENOL) 650 mg suppository Insert 650 mg into the rectum every 8 (eight) hours as needed for fever or headaches. comfort pack on hold, as needed for fever, headache  Indications: Fever, Pain, Historical Med      bisacodyl (DULCOLAX) 10 mg  suppository Insert 10 mg into the rectum daily as needed for constipation. comfort med on hold  Indications: Constipation, Historical Med      haloperidol (HALDOL) oral concentrated solution Take 1 mg by mouth every 6 (six) hours as needed for agitation (n/v). comfort med on hold  Indications: Agitated Movements Accompanied by Emotional Distress, Nausea and Vomiting, Historical Med      hyoscyamine (LEVSIN/SL) 0.125 mg SL tablet Take 0.125 mg by mouth every 4 (four) hours as needed (secretions). comfort med on hold  Indications: excessive secretions, Historical Med      LORazepam (ATIVAN) 0.5 mg tablet Take 0.5 mg by mouth every 6 (six) hours as needed for anxiety (dyspnea). Indications: Feeling Anxious, dyspnea, Starting Sat 9/14/2024, Historical Med      mirtazapine (REMERON) 15 mg tablet Take 7.5 mg by mouth daily at bedtime. Indications: Major Depressive Disorder, Starting Sat 9/14/2024, Historical Med      Morphine Sulfate, Concentrate, 20 mg/mL concentrated solution Take 5 mg by mouth every 3 (three) hours as needed for moderate pain or severe pain (dyspnea). comfort med on hold  Indications: Difficulty Breathing, Pain, Historical Med      prochlorperazine (COMPAZINE) 10 mg tablet Take 10 mg by mouth every 6 (six) hours as needed for nausea or vomiting. comfort med on hold  Indications: Nausea and Vomiting, Historical Med      senna (SENOKOT) 8.6 mg Take 8.6 mg by mouth daily as needed for constipation. Indications: Constipation, Starting Sat 9/14/2024, Historical Med      sertraline (ZOLOFT) 50 mg tablet Take 50 mg by mouth daily. Indications: Generalized Anxiety Disorder, Major Depressive Disorder, Starting Sat 9/14/2024, Historical Med           STOP taking these medications       Acetaminophen Extra Strength 500 MG TABS Comments:   Reason for Stopping:         diphenoxylate-atropine (LOMOTIL) 2.5-0.025 mg per tablet Comments:   Reason for Stopping:         furosemide (LASIX) 20 mg tablet Comments:   Reason  for Stopping:         loperamide (IMODIUM A-D) 2 MG tablet Comments:   Reason for Stopping:             No discharge procedures on file.  ED SEPSIS DOCUMENTATION   Time reflects when diagnosis was documented in both MDM as applicable and the Disposition within this note       Time User Action Codes Description Comment    9/21/2024  9:10 PM Amy Crystal [R26.2] Ambulatory dysfunction     9/21/2024  9:10 PM Amy Crystal [R53.1] Generalized weakness     9/21/2024  9:45 PM Prince Whyte [Z51.5] Comfort measures only status     9/21/2024  9:46 PM Prince Whyte [Z51.5] Hospice care patient     9/21/2024  9:46 PM Prince Whyte [F03.90] Dementia without behavioral disturbance (HCC)                  Amy Crystal DO  09/26/24 1558

## 2024-09-27 ENCOUNTER — HOME CARE VISIT (OUTPATIENT)
Dept: HOME HOSPICE | Facility: HOSPICE | Age: 89
End: 2024-09-27
Payer: MEDICARE

## 2024-09-27 ENCOUNTER — HOME CARE VISIT (OUTPATIENT)
Dept: HOME HEALTH SERVICES | Facility: HOME HEALTHCARE | Age: 89
End: 2024-09-27
Payer: MEDICARE

## 2024-09-27 PROCEDURE — G0156 HHCP-SVS OF AIDE,EA 15 MIN: HCPCS

## 2024-09-27 NOTE — UTILIZATION REVIEW
NOTIFICATION OF ADMISSION DISCHARGE   This is a Notification of Discharge from [facility]. Please be advised that this patient has been discharge from our facility. Below you will find the admission and discharge date and time including the patient’s disposition.   UTILIZATION REVIEW CONTACT:  Arlette Robbins  Utilization   Network Utilization Review Department  Phone: [phone] x carefully listen to the prompts. All voicemails are confidential.  Email: [email or web address]     ADMISSION INFORMATION  PRESENTATION DATE: 9/21/2024  4:50 PM  OBERVATION ADMISSION DATE: N/A  INPATIENT ADMISSION DATE: 9/21/24  9:11 PM   DISCHARGE DATE: 9/26/2024 10:19 AM   DISPOSITION:Home/Self Care    Network Utilization Review Department  ATTENTION: Please call with any questions or concerns to [phone] and carefully listen to the prompts so that you are directed to the right person. All voicemails are confidential.   For Discharge needs, contact Care Management DC Support Team at [phone] opt. 2  Send all requests for admission clinical reviews, approved or denied determinations and any other requests to dedicated fax number below belonging to the campus where the patient is receiving treatment. List of dedicated fax numbers for the Facilities:  FACILITY NAME UR FAX NUMBER   ADMISSION DENIALS (Administrative/Medical Necessity) [phone]   DISCHARGE SUPPORT TEAM ([facility]) [phone]   PARENT CHILD HEALTH (Maternity/NICU/Pediatrics) [phone]   General acute hospital [phone]   [facility] [phone]   [facility] [phone]   [facility] [phone]   [facility] [phone]   [facility] [phone]   [facility] [phone]   [facility] [phone]    [facility] [phone]   [facility] [phone]   [facility] [phone]   [facility] [phone]

## 2024-09-28 ENCOUNTER — HOME CARE VISIT (OUTPATIENT)
Dept: HOME HOSPICE | Facility: HOSPICE | Age: 89
End: 2024-09-28
Payer: MEDICARE

## 2024-09-28 ENCOUNTER — HOME CARE VISIT (OUTPATIENT)
Dept: HOME HEALTH SERVICES | Facility: HOME HEALTHCARE | Age: 89
End: 2024-09-28
Payer: MEDICARE

## 2024-09-28 VITALS — RESPIRATION RATE: 18 BRPM | HEART RATE: 104 BPM

## 2024-09-28 PROCEDURE — G0299 HHS/HOSPICE OF RN EA 15 MIN: HCPCS

## 2024-10-01 NOTE — DISCHARGE SUMMARY
Discharge Summary - Hospitalist   Name: Oliva Dc 89 y.o. female I MRN: [id number]  Unit/Bed#: [facility] 901-01 I Date of Admission: 9/21/2024   Date of Service: 10/1/2024 I Hospital Day: 5     Assessment & Plan  Comfort measures only status  Patient with history of advanced dementia, on home hospice, presented to the ER after several falls.  Daughter was unable to help her up and has been struggling to meet her care needs at home and thus brought pt to hospital for placement into facility for hospice care  She lives with daughter, Rosa Elena, at home. Patient's POA is her granddaughter, Eulalia.  They are both in agreement with pt remaining on comfort care measures/hospice and were interested in pursuing placement given difficulty caring for her at home.   CMO -- medications ordered.  Can defer on palliative consult at this time as goals are well outlined and symptoms are controlled.    CM consult.  Patient reportedly has a 100+ year old sister who lives at [facility] who Eulalia did report would be ideal if possible for hospice there.   CM d/w POA.  They will have to look into if they can afford room and board at facility vs if they will hire private caregivers for home to assist with pt care.    Hospice care patient  See comfort measures section above   Closed fracture of ninth thoracic vertebra (HCC)  Sustained compression fracture back in August in setting of advanced dementia, recurrent falls   On Comfort Measures  Multiple falls  In setting of advanced dementia   Dementia without behavioral disturbance (HCC)  Hx advanced dementia and dependent with ADLs     Medical Problems       Resolved Problems  Date Reviewed: 8/12/2024   None       Discharging Physician / Practitioner: Richard Arnold DO  PCP: Cortney Sanchez MD  Admission Date:   Admission Orders (From admission, onward)       Ordered        09/21/24 2111  INPATIENT ADMISSION  Once                          Discharge Date: 09/26/24        Reason for Admission:  Multiple falls, failure to thrive    Hospital Course:   Oliva Dc is a 89 y.o. female patient who originally presented to the hospital on 9/21/2024 due to falls and failure to thrive family unable to care for herself patient was transitioned to hospice          Please see above list of diagnoses and related plan for additional information.     Condition at Discharge: terminal    Discharge Day Visit / Exam:   Subjective: Patient appears comfortable  Vitals: Blood Pressure: 128/73 (09/26/24 1009)  Pulse: 72 (09/22/24 0814)  Temperature: 97.6 °F (36.4 °C) (09/21/24 1705)  Respirations: 18 (09/25/24 0300)  SpO2: 94 % (09/22/24 0814)  Physical Exam  Constitutional:       General: She is not in acute distress.     Appearance: She is not toxic-appearing or diaphoretic.   Eyes:      General: No scleral icterus.  Cardiovascular:      Rate and Rhythm: Normal rate and regular rhythm.      Heart sounds: No murmur heard.     No friction rub. No gallop.   Pulmonary:      Effort: No respiratory distress.      Breath sounds: No stridor. No wheezing, rhonchi or rales.   Chest:      Chest wall: No tenderness.   Abdominal:      General: There is no distension.      Palpations: There is no mass.      Tenderness: There is no abdominal tenderness. There is no guarding or rebound.      Hernia: No hernia is present.   Musculoskeletal:         General: No swelling or tenderness.          Discussion with Family:  Granddaughter updated.     Discharge instructions/Information to patient and family:   See after visit summary for information provided to patient and family.      Provisions for Follow-Up Care:  See after visit summary for information related to follow-up care and any pertinent home health orders.      Mobility at time of Discharge:   Basic Mobility Inpatient Raw Score: 8  JH-HLM Goal: 3: Sit at edge of bed  JH-HLM Achieved: 2: Bed activities/Dependent transfer  HLM Goal achieved. Continue to encourage appropriate  mobility.     Disposition:   Other: hospice    Planned Readmission: no    Discharge Medications:  See after visit summary for reconciled discharge medications provided to patient and/or family.      Administrative Statements   Discharge Statement:  I have spent a total time of  minutes in caring for this patient on the day of the visit/encounter. .    **Please Note: This note may have been constructed using a voice recognition system**

## 2024-10-01 NOTE — ASSESSMENT & PLAN NOTE
Patient with history of advanced dementia, on home hospice, presented to the ER after several falls.  Daughter was unable to help her up and has been struggling to meet her care needs at home and thus brought pt to hospital for placement into facility for hospice care  She lives with daughter, Rosa Elena, at home. Patient's POA is her granddaughter, Eulalia.  They are both in agreement with pt remaining on comfort care measures/hospice and were interested in pursuing placement given difficulty caring for her at home.   CMO -- medications ordered.  Can defer on palliative consult at this time as goals are well outlined and symptoms are controlled.    CM consult.  Patient reportedly has a 100+ year old sister who lives at [facility] who Eulalia did report would be ideal if possible for hospice there.   CM d/w POA.  They will have to look into if they can afford room and board at facility vs if they will hire private caregivers for home to assist with pt care.

## 2024-10-08 ENCOUNTER — HOME CARE VISIT (OUTPATIENT)
Dept: HOME HOSPICE | Facility: HOSPICE | Age: 89
End: 2024-10-08
Payer: MEDICARE

## 2024-11-07 NOTE — ASSESSMENT & PLAN NOTE
- SIRS present on admission  - Tachycardia, leukocytosis, lactic acidosis, TAMEKA present on admission  - Resolved with IV fluids and antibiotics for UTI  - Blood cultures no growth at 24 hrs   Unable to contact patient, left message to call office.